# Patient Record
Sex: FEMALE | Race: WHITE | Employment: FULL TIME | RURAL
[De-identification: names, ages, dates, MRNs, and addresses within clinical notes are randomized per-mention and may not be internally consistent; named-entity substitution may affect disease eponyms.]

---

## 2017-03-08 ENCOUNTER — OFFICE VISIT (OUTPATIENT)
Dept: FAMILY MEDICINE CLINIC | Age: 27
End: 2017-03-08

## 2017-03-08 VITALS
DIASTOLIC BLOOD PRESSURE: 75 MMHG | OXYGEN SATURATION: 100 % | TEMPERATURE: 97.3 F | SYSTOLIC BLOOD PRESSURE: 115 MMHG | WEIGHT: 179 LBS | HEART RATE: 91 BPM | BODY MASS INDEX: 31.71 KG/M2 | RESPIRATION RATE: 16 BRPM | HEIGHT: 63 IN

## 2017-03-08 DIAGNOSIS — R53.83 MALAISE AND FATIGUE: Primary | ICD-10-CM

## 2017-03-08 DIAGNOSIS — E06.3 HASHIMOTO'S THYROIDITIS: ICD-10-CM

## 2017-03-08 DIAGNOSIS — R53.81 MALAISE AND FATIGUE: Primary | ICD-10-CM

## 2017-03-08 RX ORDER — CITALOPRAM 40 MG/1
40 TABLET, FILM COATED ORAL DAILY
Qty: 30 TAB | Refills: 3 | Status: SHIPPED | OUTPATIENT
Start: 2017-03-08 | End: 2017-08-28 | Stop reason: SDUPTHER

## 2017-03-08 RX ORDER — LEVOTHYROXINE SODIUM 125 UG/1
125 TABLET ORAL
Qty: 30 TAB | Refills: 3 | Status: CANCELLED | OUTPATIENT
Start: 2017-03-08

## 2017-03-08 RX ORDER — CITALOPRAM 20 MG/1
20 TABLET, FILM COATED ORAL
Qty: 30 TAB | Refills: 5 | Status: CANCELLED | OUTPATIENT
Start: 2017-03-08

## 2017-03-08 NOTE — MR AVS SNAPSHOT
Visit Information Date & Time Provider Department Dept. Phone Encounter #  
 3/8/2017  2:30 PM Segundo Reynolds [phone] Upcoming Health Maintenance Date Due  
 HPV AGE 9Y-34Y (1 of 3 - Female 3 Dose Series) 10/4/2003 PAP AKA CERVICAL CYTOLOGY 5/6/2018 DTaP/Tdap/Td series (2 - Td) 10/12/2025 Allergies as of 3/8/2017  Review Complete On: 3/8/2017 By: Camille Elder LPN Severity Noted Reaction Type Reactions Ancef [Cefazolin] High 09/28/2015    Hives Given at c/s, swelling of face/hives, tx'd with benadryl Pcn [Penicillins] High 07/29/2011   Systemic Hives Facial edema Peanut Medium 10/13/2014   Systemic Hives Chocolate [Cocoa]  05/06/2015    Hives Citrus And Derivatives  08/31/2015    Nausea and Vomiting Oranges only [place] Low 10/13/2014   Systemic Hives Beef Containing Products Low 10/13/2014   Systemic Hives Chicken Derived Low 10/13/2014   Systemic Hives [place] Low 10/13/2014   Systemic Hives Egg Low 10/13/2014   Systemic Hives Milk Low 10/13/2014   Systemic Hives Milk Prot-turm-pepper-pinea Ex Low 10/13/2014   Systemic Hives Shellfish Derived Low 10/13/2014   Systemic Hives Soy Low 10/13/2014   Systemic Hives [place] Low 10/13/2014   Systemic Hives Current Immunizations  Never Reviewed Name Date Tdap 10/12/2015 Not reviewed this visit You Were Diagnosed With   
  
 Codes Comments Malaise and fatigue    -  Primary ICD-10-CM: R53.81, R53.83 ICD-9-CM: 780.79 Hashimoto's thyroiditis     ICD-10-CM: E06.3 ICD-9-CM: 017. 2 Vitals BP Pulse Temp Resp Height(growth percentile) Weight(growth percentile) 115/75 (BP 1 Location: Right arm, BP Patient Position: Sitting) 91 97.3 °F (36.3 °C) (Oral) 16 5' 3\" (1.6 m) 179 lb (81.2 kg) LMP SpO2 Breastfeeding? BMI OB Status Smoking Status 02/27/2017 (Exact Date) 100% No 31.71 kg/m2 Having regular periods Former Smoker Vitals History BMI and BSA Data Body Mass Index Body Surface Area 31.71 kg/m 2 1.9 m 2 Preferred Pharmacy Pharmacy Name Phone [facility] [address] [facility] [phone] Your Updated Medication List  
  
   
This list is accurate as of: 3/8/17  3:17 PM.  Always use your most recent med list.  
  
  
  
  
 * citalopram 20 mg tablet Commonly known as:  Luci Weldon Take 1 Tab by mouth once over twenty-four (24) hours. * citalopram 40 mg tablet Commonly known as:  Luci Weldon Take 1 Tab by mouth daily for 30 days. ibuprofen 600 mg tablet Commonly known as:  MOTRIN Take 1 Tab by mouth every six (6) hours as needed for Pain. IRON PO Take 1 Tab by mouth once over twenty-four (24) hours. levothyroxine 125 mcg tablet Commonly known as:  SYNTHROID Take 1 Tab by mouth Daily (before breakfast). Indications: HASHIMOTO THYROIDITIS * Notice: This list has 2 medication(s) that are the same as other medications prescribed for you. Read the directions carefully, and ask your doctor or other care provider to review them with you. Prescriptions Sent to Pharmacy Refills  
 citalopram (CELEXA) 40 mg tablet 3 Sig: Take 1 Tab by mouth daily for 30 days. Class: Normal  
 Pharmacy: [address] Ph #: [phone] Route: Oral  
  
We Performed the Following CBC WITH AUTOMATED DIFF [15432 CPT(R)] METABOLIC PANEL, COMPREHENSIVE [09128 CPT(R)] THYROID CASCADE PROFILE [[id number] Custom] Patient Instructions Preventing a Relapse of Depression: Care Instructions Your Care Instructions A relapse of depression means your symptoms have come back after you have gotten better. This illness often comes and goes during a lifetime.  But there are many things you can do to keep it from coming back. Follow-up care is a key part of your treatment and safety. Be sure to make and go to all appointments, and call your doctor if you are having problems. It's also a good idea to know your test results and keep a list of the medicines you take. What do you need to know? Know your risk of relapse Talk to your doctor to find out if you are at risk of relapse. Many things can make a person more likely to relapse into depression. These include having a family member with depression, dealing with serious problems in a relationship or a job, having a serious medical condition, or abusing drugs or alcohol. It is important to know your risk and to recognize warning signs of relapse. Once you know these things, you will be better able to keep it from happening to you. Know the warning signs of relapse The two most common signs of relapse are: · Feeling sad or hopeless. · Losing interest in your daily activities. You may have other symptoms, such as: 
· You lose or gain weight. · You sleep too much or not enough. · You feel restless and unable to sit still. · You feel unable to move. · You feel tired all the time. · You feel unworthy or guilty without an obvious reason. · You have problems concentrating, remembering, or making decisions. · You think often about death or suicide. · You feel angry or have panic attacks. How can you care for yourself at home? · Take your medicine as prescribed. Call your doctor if you have any problems with your medicine. Many people take their medicines for at least 6 months after they have recovered. This often helps keep symptoms from coming back. However, if your depression keeps coming back, you may have to take medicine for the rest of your life. · Continue counseling even after you have stopped taking medicine. · Eat healthy foods. Include fruits, vegetables, beans, and whole grains in your diet each day. · Get at least 30 minutes of exercise on most days of the week. Walking is a good choice. You also may want to do other activities, such as running, swimming, cycling, or playing tennis or team sports. · See your doctor right away if you have new symptoms or feel that your depression is coming back. · Keep a regular sleep schedule. Try for 8 hours of sleep a night. · Avoid alcohol and illegal drugs. · Keep the numbers for these national suicide hotlines: 7-449-347-TALK (1-117.729.7995) and 2-018-LCEOBZR (6-975.444.5547). If you or someone you know talks about suicide or feeling hopeless, get help right away. When should you call for help? Call 911 anytime you think you may need emergency care. For example, call if: 
· You are thinking about suicide or are threatening suicide. · You feel you cannot stop from hurting yourself or someone else. · You hear or see things that aren't real. 
· You think or speak in a bizarre way that is not like your usual behavior. Call your doctor now or seek immediate medical care if: 
· You are drinking a lot of alcohol or using illegal drugs. · You are talking or writing about death. Watch closely for changes in your health, and be sure to contact your doctor if: 
· You find it hard or it's getting harder to deal with school, a job, family, or friends. · You think your treatment is not helping or you are not getting better. · Your symptoms get worse or you get new symptoms. · You have any problems with your antidepressant medicines, such as side effects, or you are thinking about stopping your medicine. · You are having manic behavior, such as having very high energy, needing less sleep than normal, or showing risky behavior such as spending money you don't have or abusing others verbally or physically. Where can you learn more? Go to http://mamie-sandrita.info/.  
Enter U440 in the search box to learn more about \"Preventing a Relapse of Depression: Care Instructions. \" Current as of: July 26, 2016 Content Version: 11.1 © 6443-3661 [employer]. Care instructions adapted under license by [employer] (which disclaims liability or warranty for this information). If you have questions about a medical condition or this instruction, always ask your healthcare professional. [address] any warranty or liability for your use of this information. Introducing \[facility]\"" & HEALTH SERVICES! Romayne Duster introduces BTC Trip patient portal. Now you can access parts of your medical record, email your doctor's office, and request medication refills online. 1. In your internet browser, go to [email or web address] 2. Click on the First Time User? Click Here link in the Sign In box. You will see the New Member Sign Up page. 3. Enter your BTC Trip Access Code exactly as it appears below. You will not need to use this code after youve completed the sign-up process. If you do not sign up before the expiration date, you must request a new code. · BTC Trip Access Code: [email or web address] Expires: 6/6/2017  3:17 PM 
 
4. Enter the last four digits of your Social Security Number (xxxx) and Date of Birth (mm/dd/yyyy) as indicated and click Submit. You will be taken to the next sign-up page. 5. Create a BTC Trip ID. This will be your BTC Trip login ID and cannot be changed, so think of one that is secure and easy to remember. 6. Create a BTC Trip password. You can change your password at any time. 7. Enter your Password Reset Question and Answer. This can be used at a later time if you forget your password. 8. Enter your e-mail address. You will receive e-mail notification when new information is available in [address] 9. Click Sign Up. You can now view and download portions of your medical record. 10. Click the Download Summary menu link to download a portable copy of your medical information. If you have questions, please visit the Frequently Asked Questions section of the [employer] website. Remember, Passbox is NOT to be used for urgent needs. For medical emergencies, dial 911. Now available from your iPhone and Android! Please provide this summary of care documentation to your next provider. Your primary care clinician is listed as [address]. If you have any questions after today's visit, please call [phone].

## 2017-03-08 NOTE — PROGRESS NOTES
CC:  Chief Complaint   Patient presents with    Thyroid Problem    Depression    Labs    Medication Refill       Subjective:      Anita Johnson is an 27 y.o. female who presents for followup of hypothyroidism. Thyroid ROS: fatigue, weight gain and feeling cold and cold intolerance. Patient Active Problem List    Diagnosis Date Noted    Previous  delivery affecting pregnancy 2015    Pregnancy 2015    Abnormal chromosomal and genetic finding on  screening of mother 10/26/2015    H/O:  2015    Supervision of other normal pregnancy 2015    H/O idiopathic urticaria 2012    Hashimoto's thyroiditis 10/24/2011    Hypothyroid 2011    Multiple allergies 2011     Current Outpatient Prescriptions   Medication Sig Dispense Refill    citalopram (CELEXA) 40 mg tablet Take 1 Tab by mouth daily for 30 days. 30 Tab 3    ibuprofen (MOTRIN) 600 mg tablet Take 1 Tab by mouth every six (6) hours as needed for Pain. 20 Tab 0    citalopram (CELEXA) 20 mg tablet Take 1 Tab by mouth once over twenty-four (24) hours. 30 Tab 5    levothyroxine (SYNTHROID) 125 mcg tablet Take 1 Tab by mouth Daily (before breakfast). Indications: HASHIMOTO THYROIDITIS 30 Tab 3    FERROUS FUMARATE (IRON PO) Take 1 Tab by mouth once over twenty-four (24) hours.        Allergies   Allergen Reactions    Ancef [Cefazolin] Hives     Given at c/s, swelling of face/hives, tx'd with benadryl    Pcn [Penicillins] Hives     Facial edema     Peanut Hives    Chocolate [Cocoa] Hives    Citrus And Derivatives Nausea and Vomiting     Oranges only    [place] Hives    Beef Containing Products Hives    Chicken Derived Hives    Corn Hives    Egg Hives    Milk Hives    Milk Prot-Turm-Pepper-Pinea Ex Hives    Shellfish Derived Hives    Soy Hives    [place] Hives     Past Medical History:   Diagnosis Date    Environmental allergies     GERD (gastroesophageal reflux disease)     Hashimoto's thyroiditis 10/24/2011    Hashimoto's thyroiditis 10/24/2011    Hypotension     Hypothyroid 2011    Hypothyroidism     Psychiatric disorder     depression --2 yr old daughter  2013    Unspecified adverse effect of anesthesia     per patient with epidural had severe N&V and passed out     Past Surgical History:   Procedure Laterality Date    DELIVERY   11    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, congenital anomaly    HX  SECTION  2016     Family History   Problem Relation Age of Onset    Heart Disease Mother      miltral value prolapse    Thyroid Disease Maternal Grandmother     Diabetes Paternal Grandmother     Diabetes Maternal Aunt     Cancer Paternal Grandfather      throat/stomach cancer,  at age 72    Breast Cancer Other      maternal great grandmother     Social History   Substance Use Topics    Smoking status: Former Smoker    Smokeless tobacco: Never Used    Alcohol use Yes      Comment: Not while pregnant        Objective:     Visit Vitals    /75 (BP 1 Location: Right arm, BP Patient Position: Sitting)    Pulse 91    Temp 97.3 °F (36.3 °C) (Oral)    Resp 16    Ht 5' 3\" (1.6 m)    Wt 179 lb (81.2 kg)    LMP 2017 (Exact Date)    SpO2 100%    Breastfeeding No    BMI 31.71 kg/m2     Exam: thyroid is normal in size without nodules or tenderness. Laboratory:  Lab Results   Component Value Date/Time    TSH 5.180 10/27/2016 01:17 PM    TSH 0.496 2011 02:35 PM    T3 Uptake 32 2011 03:16 PM    T4, Free 1.71 2011 02:35 PM    T4, Total 9.5 2011 03:16 PM         Assessment/Plan:     hypothyroidism control uncertain. Will send for labs and will advise when the results return. ICD-10-CM ICD-9-CM    1. Malaise and fatigue H87.64 856.07 METABOLIC PANEL, COMPREHENSIVE    R53.83  CBC WITH AUTOMATED DIFF   2.  Hashimoto's thyroiditis E06.3 245.2 THYROID CASCADE PROFILE     Pt verbalizes understanding of plan of care and denies further questions or concerns at this time. Follow-up Disposition:  Return if symptoms worsen or fail to improve.

## 2017-03-08 NOTE — PROGRESS NOTES
Identified pt with two pt identifiers(name and ).     Chief Complaint   Patient presents with    Thyroid Problem    Depression    Labs    Medication Refill        Health Maintenance Due   Topic    HPV AGE 9Y-26Y (1 of 3 - Female 3 Dose Series)       Wt Readings from Last 3 Encounters:   17 179 lb (81.2 kg)   16 175 lb (79.4 kg)   16 175 lb (79.4 kg)     Temp Readings from Last 3 Encounters:   17 97.3 °F (36.3 °C) (Oral)   16 97.8 °F (36.6 °C)   16 98.1 °F (36.7 °C) (Oral)     BP Readings from Last 3 Encounters:   17 115/75   16 102/55   16 100/64     Pulse Readings from Last 3 Encounters:   17 91   16 79   16 92         Learning Assessment:  :     Learning Assessment 2015 2014 3/21/2014   PRIMARY LEARNER Patient Patient Patient   HIGHEST LEVEL OF EDUCATION - PRIMARY LEARNER  GRADUATED HIGH SCHOOL OR GED GRADUATED HIGH SCHOOL OR GED GRADUATED HIGH SCHOOL OR GED   BARRIERS PRIMARY LEARNER NONE NONE NONE   CO-LEARNER CAREGIVER No No -   PRIMARY LANGUAGE ENGLISH ENGLISH ENGLISH   LEARNER PREFERENCE PRIMARY READING DEMONSTRATION DEMONSTRATION   ANSWERED BY Patient patient patient   RELATIONSHIP SELF SELF SELF       Depression Screening:  :     PHQ 2 / 9, over the last two weeks 10/27/2016   Little interest or pleasure in doing things Not at all   Feeling down, depressed or hopeless Not at all   Total Score PHQ 2 0   Trouble falling or staying asleep, or sleeping too much -   Feeling tired or having little energy -   Poor appetite or overeating -   Feeling bad about yourself - or that you are a failure or have let yourself or your family down -   Trouble concentrating on things such as school, work, reading or watching TV -   Moving or speaking so slowly that other people could have noticed; or the opposite being so fidgety that others notice -   Thoughts of being better off dead, or hurting yourself in some way -   PHQ 9 Score - How difficult have these problems made it for you to do your work, take care of your home and get along with others -       Fall Risk Assessment:  :     No flowsheet data found. Abuse Screening:  :     Abuse Screening Questionnaire 10/27/2016 5/22/2014   Do you ever feel afraid of your partner? N N   Are you in a relationship with someone who physically or mentally threatens you? N N   Is it safe for you to go home? Y Y       Coordination of Care Questionnaire:  :     1) Have you been to an emergency room, urgent care clinic since your last visit? no   Hospitalized since your last visit? no             2) Have you seen or consulted any other health care providers outside of [address] [place] [address] since your last visit? no  (Include any pap smears or colon screenings in this section.)    3) Do you have an Advance Directive on file? no  Are you interested in receiving information about Advance Directives? no    Patient is accompanied by self. Reviewed record in preparation for visit and have obtained necessary documentation. Medication reconciliation up to date and corrected with patient at this time.

## 2017-03-09 LAB
ALBUMIN SERPL-MCNC: 4.5 G/DL (ref 3.5–5.5)
ALBUMIN/GLOB SERPL: 1.4 {RATIO} (ref 1.1–2.5)
ALP SERPL-CCNC: 72 IU/L (ref 39–117)
ALT SERPL-CCNC: 19 IU/L (ref 0–32)
AST SERPL-CCNC: 17 IU/L (ref 0–40)
BASOPHILS # BLD AUTO: 0 X10E3/UL (ref 0–0.2)
BASOPHILS NFR BLD AUTO: 1 %
BILIRUB SERPL-MCNC: <0.2 MG/DL (ref 0–1.2)
BUN SERPL-MCNC: 13 MG/DL (ref 6–20)
BUN/CREAT SERPL: 20 (ref 8–20)
CALCIUM SERPL-MCNC: 9.6 MG/DL (ref 8.7–10.2)
CHLORIDE SERPL-SCNC: 100 MMOL/L (ref 96–106)
CO2 SERPL-SCNC: 26 MMOL/L (ref 18–29)
CREAT SERPL-MCNC: 0.66 MG/DL (ref 0.57–1)
EOSINOPHIL # BLD AUTO: 0.1 X10E3/UL (ref 0–0.4)
EOSINOPHIL NFR BLD AUTO: 1 %
ERYTHROCYTE [DISTWIDTH] IN BLOOD BY AUTOMATED COUNT: 13 % (ref 12.3–15.4)
GLOBULIN SER CALC-MCNC: 3.2 G/DL (ref 1.5–4.5)
GLUCOSE SERPL-MCNC: 87 MG/DL (ref 65–99)
HCT VFR BLD AUTO: 39.6 % (ref 34–46.6)
HGB BLD-MCNC: 12.8 G/DL (ref 11.1–15.9)
IMM GRANULOCYTES # BLD: 0 X10E3/UL (ref 0–0.1)
IMM GRANULOCYTES NFR BLD: 0 %
INTERPRETIVE COMMENT, 010391: NORMAL
LYMPHOCYTES # BLD AUTO: 2 X10E3/UL (ref 0.7–3.1)
LYMPHOCYTES NFR BLD AUTO: 31 %
MCH RBC QN AUTO: 27.8 PG (ref 26.6–33)
MCHC RBC AUTO-ENTMCNC: 32.3 G/DL (ref 31.5–35.7)
MCV RBC AUTO: 86 FL (ref 79–97)
MONOCYTES # BLD AUTO: 0.5 X10E3/UL (ref 0.1–0.9)
MONOCYTES NFR BLD AUTO: 8 %
NEUTROPHILS # BLD AUTO: 3.9 X10E3/UL (ref 1.4–7)
NEUTROPHILS NFR BLD AUTO: 59 %
PLATELET # BLD AUTO: 357 X10E3/UL (ref 150–379)
POTASSIUM SERPL-SCNC: 4.5 MMOL/L (ref 3.5–5.2)
PROT SERPL-MCNC: 7.7 G/DL (ref 6–8.5)
RBC # BLD AUTO: 4.6 X10E6/UL (ref 3.77–5.28)
SODIUM SERPL-SCNC: 140 MMOL/L (ref 134–144)
T3FREE SERPL-MCNC: 3.3 PG/ML (ref 2–4.4)
T4 FREE SERPL-MCNC: 1.41 NG/DL (ref 0.82–1.77)
TSH SERPL DL<=0.005 MIU/L-ACNC: 0.29 UIU/ML (ref 0.45–4.5)
WBC # BLD AUTO: 6.5 X10E3/UL (ref 3.4–10.8)

## 2017-03-13 ENCOUNTER — TELEPHONE (OUTPATIENT)
Dept: FAMILY MEDICINE CLINIC | Age: 27
End: 2017-03-13

## 2017-03-13 DIAGNOSIS — E06.3 HASHIMOTO'S THYROIDITIS: Primary | ICD-10-CM

## 2017-03-13 NOTE — TELEPHONE ENCOUNTER
----- Message from Diaz Orr MD sent at 3/13/2017  1:22 PM EDT -----  Results show that her TSH is low. This means that her medication may be too high. However, I would like to recheck it in 1-month to see if this is the case. If it is still too low, then will change the medication. I will put in an order and in 1-month she should just stop in and have her TSH rechecked.

## 2017-03-13 NOTE — PROGRESS NOTES
Results show that her TSH is low. This means that her medication may be too high. However, I would like to recheck it in 1-month to see if this is the case. If it is still too low, then will change the medication. I will put in an order and in 1-month she should just stop in and have her TSH rechecked.

## 2017-03-29 ENCOUNTER — OFFICE VISIT (OUTPATIENT)
Dept: FAMILY MEDICINE CLINIC | Age: 27
End: 2017-03-29

## 2017-03-29 VITALS
BODY MASS INDEX: 32.25 KG/M2 | HEIGHT: 63 IN | RESPIRATION RATE: 18 BRPM | WEIGHT: 182 LBS | OXYGEN SATURATION: 98 % | TEMPERATURE: 98 F | HEART RATE: 71 BPM | DIASTOLIC BLOOD PRESSURE: 66 MMHG | SYSTOLIC BLOOD PRESSURE: 108 MMHG

## 2017-03-29 DIAGNOSIS — E06.3 HASHIMOTO'S THYROIDITIS: Primary | ICD-10-CM

## 2017-03-29 NOTE — PROGRESS NOTES
CC:  Chief Complaint   Patient presents with    Labs     Thyroid    Follow-up     HISTORY OF PRESENT ILLNESS  Jimenez Martinez is a 27 y.o. female. HPI Comments: Who presents today for follow up of thyroid testing which showed a low TSH suggesting elevated medication. However, would like to recheck prior to changing the medications. In addition, she is here for follow up of her depression which seems to be improving with the addition of the citalopram. She reports that she is beginning to feel a littler better. Labs     Follow-up         ROS:  Review of Systems   All other systems reviewed and are negative. OBJECTIVE:  Visit Vitals    /66 (BP 1 Location: Right arm, BP Patient Position: Sitting)  Comment: auto cuff    Pulse 71    Temp 98 °F (36.7 °C) (Oral)    Resp 18    Ht 5' 3\" (1.6 m)    Wt 182 lb (82.6 kg)    LMP 02/27/2017    SpO2 98%    BMI 32.24 kg/m2   Physical Exam   Eyes: Pupils are equal, round, and reactive to light. Neck: Normal range of motion. Cardiovascular: Normal rate and regular rhythm. Pulmonary/Chest: Effort normal and breath sounds normal.   Musculoskeletal: Normal range of motion. Neurological: She is alert. Nursing note and vitals reviewed.     LABS:  Results for orders placed or performed in visit on 03/08/17   THYROID CASCADE PROFILE   Result Value Ref Range    TSH 0.290 (L) 0.450 - 1.382 uIU/mL   METABOLIC PANEL, COMPREHENSIVE   Result Value Ref Range    Glucose 87 65 - 99 mg/dL    BUN 13 6 - 20 mg/dL    Creatinine 0.66 0.57 - 1.00 mg/dL    GFR est non- >59 mL/min/1.73    GFR est  >59 mL/min/1.73    BUN/Creatinine ratio 20 8 - 20    Sodium 140 134 - 144 mmol/L    Potassium 4.5 3.5 - 5.2 mmol/L    Chloride 100 96 - 106 mmol/L    CO2 26 18 - 29 mmol/L    Calcium 9.6 8.7 - 10.2 mg/dL    Protein, total 7.7 6.0 - 8.5 g/dL    Albumin 4.5 3.5 - 5.5 g/dL    GLOBULIN, TOTAL 3.2 1.5 - 4.5 g/dL    A-G Ratio 1.4 1.1 - 2.5    Bilirubin, total <0.2 0.0 - 1.2 mg/dL    Alk. phosphatase 72 39 - 117 IU/L    AST (SGOT) 17 0 - 40 IU/L    ALT (SGPT) 19 0 - 32 IU/L   CBC WITH AUTOMATED DIFF   Result Value Ref Range    WBC 6.5 3.4 - 10.8 x10E3/uL    RBC 4.60 3.77 - 5.28 x10E6/uL    HGB 12.8 11.1 - 15.9 g/dL    HCT 39.6 34.0 - 46.6 %    MCV 86 79 - 97 fL    MCH 27.8 26.6 - 33.0 pg    MCHC 32.3 31.5 - 35.7 g/dL    RDW 13.0 12.3 - 15.4 %    PLATELET 600 901 - 360 x10E3/uL    NEUTROPHILS 59 %    Lymphocytes 31 %    MONOCYTES 8 %    EOSINOPHILS 1 %    BASOPHILS 1 %    ABS. NEUTROPHILS 3.9 1.4 - 7.0 x10E3/uL    Abs Lymphocytes 2.0 0.7 - 3.1 x10E3/uL    ABS. MONOCYTES 0.5 0.1 - 0.9 x10E3/uL    ABS. EOSINOPHILS 0.1 0.0 - 0.4 x10E3/uL    ABS. BASOPHILS 0.0 0.0 - 0.2 x10E3/uL    IMMATURE GRANULOCYTES 0 %    ABS. IMM. GRANS. 0.0 0.0 - 0.1 x10E3/uL   THYROXINE (T4) FREE, DIRECT, S   Result Value Ref Range    T4,Free,(Direct) 1.41 0.82 - 1.77 ng/dL   T3FREE   Result Value Ref Range    Triiodothyronine(T3), free 3.3 2.0 - 4.4 pg/mL    Interpretive comment Comment        ASSESSMENT and PLAN    ICD-10-CM ICD-9-CM    1. Hashimoto's thyroiditis E06.3 245.2 THYROID CASCADE PROFILE     Given very low TSH levels, will repeat and if still low, will decrease medication dose. Also, she is doing well with citalopram and will continue. Pt verbalizes understanding of plan of care and denies further questions or concerns at this time. Follow-up Disposition:  Return if symptoms worsen or fail to improve.

## 2017-03-29 NOTE — MR AVS SNAPSHOT
Visit Information Date & Time Provider Department Dept. Phone Encounter #  
 3/29/2017  2:30 PM [facility] A[facility]as [phone] Upcoming Health Maintenance Date Due  
 HPV AGE 9Y-34Y (1 of 3 - Female 3 Dose Series) 10/4/2003 PAP AKA CERVICAL CYTOLOGY 5/6/2018 DTaP/Tdap/Td series (2 - Td) 10/12/2025 Allergies as of 3/29/2017  Review Complete On: 3/29/2017 By: Markham Holter, LPN Severity Noted Reaction Type Reactions Ancef [Cefazolin] High 09/28/2015    Hives Given at c/s, swelling of face/hives, tx'd with benadryl Pcn [Penicillins] High 07/29/2011   Systemic Hives Facial edema Peanut Medium 10/13/2014   Systemic Hives Chocolate [Cocoa]  05/06/2015    Hives Citrus And Derivatives  08/31/2015    Nausea and Vomiting Oranges only [place] Low 10/13/2014   Systemic Hives Beef Containing Products Low 10/13/2014   Systemic Hives Chicken Derived Low 10/13/2014   Systemic Hives [place] Low 10/13/2014   Systemic Hives Egg Low 10/13/2014   Systemic Hives Milk Low 10/13/2014   Systemic Hives Milk Prot-turm-pepper-pinea Ex Low 10/13/2014   Systemic Hives Shellfish Derived Low 10/13/2014   Systemic Hives Soy Low 10/13/2014   Systemic Hives Lottie Low 10/13/2014   Systemic Hives Current Immunizations  Never Reviewed Name Date Tdap 10/12/2015 Not reviewed this visit You Were Diagnosed With   
  
 Codes Comments Hashimoto's thyroiditis    -  Primary ICD-10-CM: E06.3 ICD-9-CM: 548. 2 Vitals BP Pulse Temp Resp Height(growth percentile) Weight(growth percentile) 108/66 (BP 1 Location: Right arm, BP Patient Position: Sitting) 71 98 °F (36.7 °C) (Oral) 18 5' 3\" (1.6 m) 182 lb (82.6 kg) LMP SpO2 BMI OB Status Smoking Status 02/27/2017 98% 32.24 kg/m2 Having regular periods Former Smoker Vitals History BMI and BSA Data Body Mass Index Body Surface Area 32.24 kg/m 2 1.92 m 2 Preferred Pharmacy Pharmacy Name Phone [facility] [address] [facility] [address] [place] [phone] Your Updated Medication List  
  
   
This list is accurate as of: 3/29/17  2:53 PM.  Always use your most recent med list.  
  
  
  
  
 * citalopram 20 mg tablet Commonly known as:  Cheryal Coke Take 1 Tab by mouth once over twenty-four (24) hours. * citalopram 40 mg tablet Commonly known as:  Cheryal Coke Take 1 Tab by mouth daily for 30 days. ibuprofen 600 mg tablet Commonly known as:  MOTRIN Take 1 Tab by mouth every six (6) hours as needed for Pain. IRON PO Take 1 Tab by mouth once over twenty-four (24) hours. levothyroxine 125 mcg tablet Commonly known as:  SYNTHROID Take 1 Tab by mouth Daily (before breakfast). Indications: HASHIMOTO THYROIDITIS * Notice: This list has 2 medication(s) that are the same as other medications prescribed for you. Read the directions carefully, and ask your doctor or other care provider to review them with you. We Performed the Following THYROID CASCADE PROFILE [[id number] Custom] Introducing [facility] & [facility] SERVICES! Dear Ean: Thank you for requesting a "[employer] account. Our records indicate that you already have an active "[employer] account. You can access your account anytime at [email or web address] Did you know that you can access your hospital and ER discharge instructions at any time in "[employer]? You can also review all of your test results from your hospital stay or ER visit. Additional Information If you have questions, please visit the Frequently Asked Questions section of the "[employer] website at [email or web address] Remember, "[employer] is NOT to be used for urgent needs. For medical emergencies, dial 911. Now available from your iPhone and Android! Please provide this summary of care documentation to your next provider. Your primary care clinician is listed as [address]. If you have any questions after today's visit, please call [phone].

## 2017-03-30 LAB
INTERPRETIVE COMMENT, 010391: NORMAL
T3FREE SERPL-MCNC: 2.9 PG/ML (ref 2–4.4)
T4 FREE SERPL-MCNC: 1.4 NG/DL (ref 0.82–1.77)
TSH SERPL DL<=0.005 MIU/L-ACNC: 0.37 UIU/ML (ref 0.45–4.5)

## 2017-04-06 ENCOUNTER — TELEPHONE (OUTPATIENT)
Dept: FAMILY MEDICINE CLINIC | Age: 27
End: 2017-04-06

## 2017-04-06 RX ORDER — LEVOTHYROXINE SODIUM 100 UG/1
100 TABLET ORAL
Qty: 30 TAB | Refills: 3 | Status: SHIPPED | OUTPATIENT
Start: 2017-04-06 | End: 2017-05-06

## 2017-04-06 NOTE — TELEPHONE ENCOUNTER
----- Message from Matti Mack MD sent at 4/6/2017  1:54 PM EDT -----  Repeat TSH was still very low. I am going to cut back her medications to 100 mcgs per day. Prescription has been sent to her pharmacy of record. Would like to recheck her TSH in 1-month of starting the new dose.

## 2017-06-30 ENCOUNTER — HOSPITAL ENCOUNTER (EMERGENCY)
Age: 27
Discharge: HOME | End: 2017-07-01
Attending: EMERGENCY MEDICINE | Admitting: EMERGENCY MEDICINE
Payer: COMMERCIAL
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
## 2017-06-30 DIAGNOSIS — M79.10 MUSCULAR ACHES: ICD-10-CM

## 2017-06-30 PROCEDURE — 99284 EMERGENCY DEPT VISIT MOD MDM: CPT

## 2017-06-30 RX ORDER — LEVOTHYROXINE SODIUM 100 UG/1
100 TABLET ORAL
COMMUNITY
End: 2017-08-28 | Stop reason: SDUPTHER

## 2017-06-30 NOTE — LETTER
[address] 
[facility] EMERGENCY DEPT 
[address] [facility] [id number] [phone] Work/School Note Date: 6/30/2017 To Whom It May concern: 
 
Lilo Adames was seen and treated today in the emergency room by the following provider(s): 
Attending Provider: Polo Shaikh MD 
Nurse Practitioner: Carlos Morales NP. Lilo Adames may return to work on 7/5/2017. Sincerely, Carlos Morales NP

## 2017-07-01 VITALS
DIASTOLIC BLOOD PRESSURE: 72 MMHG | HEART RATE: 99 BPM | TEMPERATURE: 97.6 F | WEIGHT: 280 LBS | HEIGHT: 63 IN | OXYGEN SATURATION: 99 % | BODY MASS INDEX: 49.61 KG/M2 | RESPIRATION RATE: 16 BRPM | SYSTOLIC BLOOD PRESSURE: 119 MMHG

## 2017-07-01 PROCEDURE — 74011250637 HC RX REV CODE- 250/637: Performed by: NURSE PRACTITIONER

## 2017-07-01 RX ORDER — IBUPROFEN 800 MG/1
800 TABLET ORAL
Status: COMPLETED | OUTPATIENT
Start: 2017-07-01 | End: 2017-07-01

## 2017-07-01 RX ORDER — HYDROCODONE BITARTRATE AND ACETAMINOPHEN 5; 325 MG/1; MG/1
1 TABLET ORAL
Qty: 8 TAB | Refills: 0 | Status: SHIPPED | OUTPATIENT
Start: 2017-07-01 | End: 2017-08-28 | Stop reason: ALTCHOICE

## 2017-07-01 RX ORDER — METHOCARBAMOL 500 MG/1
750 TABLET, FILM COATED ORAL
Status: COMPLETED | OUTPATIENT
Start: 2017-07-01 | End: 2017-07-01

## 2017-07-01 RX ORDER — IBUPROFEN 800 MG/1
800 TABLET ORAL
Qty: 30 TAB | Refills: 0 | Status: SHIPPED | OUTPATIENT
Start: 2017-07-01 | End: 2018-01-09 | Stop reason: ALTCHOICE

## 2017-07-01 RX ORDER — METHOCARBAMOL 500 MG/1
500 TABLET, FILM COATED ORAL 4 TIMES DAILY
Qty: 12 TAB | Refills: 0 | Status: SHIPPED | OUTPATIENT
Start: 2017-07-01 | End: 2017-08-28 | Stop reason: ALTCHOICE

## 2017-07-01 RX ADMIN — METHOCARBAMOL 750 MG: 500 TABLET ORAL at 00:53

## 2017-07-01 RX ADMIN — IBUPROFEN 800 MG: 800 TABLET, FILM COATED ORAL at 00:41
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
## 2017-08-28 ENCOUNTER — OFFICE VISIT (OUTPATIENT)
Dept: FAMILY MEDICINE CLINIC | Age: 27
End: 2017-08-28

## 2017-08-28 VITALS
RESPIRATION RATE: 16 BRPM | HEIGHT: 63 IN | WEIGHT: 189 LBS | SYSTOLIC BLOOD PRESSURE: 108 MMHG | TEMPERATURE: 98 F | BODY MASS INDEX: 33.49 KG/M2 | OXYGEN SATURATION: 97 % | DIASTOLIC BLOOD PRESSURE: 71 MMHG | HEART RATE: 74 BPM

## 2017-08-28 DIAGNOSIS — E06.3 HYPOTHYROIDISM DUE TO HASHIMOTO'S THYROIDITIS: Primary | ICD-10-CM

## 2017-08-28 DIAGNOSIS — E03.8 HYPOTHYROIDISM DUE TO HASHIMOTO'S THYROIDITIS: Primary | ICD-10-CM

## 2017-08-28 RX ORDER — CITALOPRAM 40 MG/1
40 TABLET, FILM COATED ORAL
COMMUNITY
Start: 2017-07-08 | End: 2017-08-28 | Stop reason: SDUPTHER

## 2017-08-28 RX ORDER — CITALOPRAM 40 MG/1
40 TABLET, FILM COATED ORAL
Qty: 90 TAB | Refills: 1 | Status: SHIPPED | OUTPATIENT
Start: 2017-08-28 | End: 2018-04-03 | Stop reason: SDUPTHER

## 2017-08-28 RX ORDER — LEVOTHYROXINE SODIUM 100 UG/1
100 TABLET ORAL
Qty: 90 TAB | Refills: 1 | Status: SHIPPED | OUTPATIENT
Start: 2017-08-28 | End: 2018-01-15 | Stop reason: DRUGHIGH

## 2017-08-28 NOTE — MR AVS SNAPSHOT
Visit Information Date & Time Provider Department Dept. Phone Encounter #  
 8/28/2017  1:45 PM Gregor Tuttle [address] [id number] Upcoming Health Maintenance Date Due  
 HPV AGE 9Y-34Y (1 of 3 - Female 3 Dose Series) 10/4/2003 INFLUENZA AGE 9 TO ADULT 8/1/2017 PAP AKA CERVICAL CYTOLOGY 5/6/2018 DTaP/Tdap/Td series (2 - Td) 10/12/2025 Allergies as of 8/28/2017  Review Complete On: 8/28/2017 By: Gregor Tuttle MD  
  
 Severity Noted Reaction Type Reactions Ancef [Cefazolin] High 09/28/2015    Hives Given at c/s, swelling of face/hives, tx'd with benadryl Pcn [Penicillins] High 07/29/2011   Systemic Hives Facial edema Peanut Medium 10/13/2014   Systemic Hives Chocolate [Cocoa]  05/06/2015    Hives Citrus And Derivatives  08/31/2015    Nausea and Vomiting Oranges only [place] Low 10/13/2014   Systemic Hives Beef Containing Products Low 10/13/2014   Systemic Hives Chicken Derived Low 10/13/2014   Systemic Hives [place] Low 10/13/2014   Systemic Hives Egg Low 10/13/2014   Systemic Hives Milk Low 10/13/2014   Systemic Hives Milk Prot-turm-pepper-pineappl Low 10/13/2014   Systemic Hives Shellfish Derived Low 10/13/2014   Systemic Hives Soy Low 10/13/2014   Systemic Hives [place] Low 10/13/2014   Systemic Hives Current Immunizations  Never Reviewed Name Date Tdap 10/12/2015 Not reviewed this visit You Were Diagnosed With   
  
 Codes Comments Hypothyroidism due to Hashimoto's thyroiditis    -  Primary ICD-10-CM: E03.8, E06.3 ICD-9-CM: 244.8, 245.2 Vitals BP Pulse Temp Resp Height(growth percentile) Weight(growth percentile) 108/71 (BP 1 Location: Right arm, BP Patient Position: Sitting) 74 98 °F (36.7 °C) (Oral) 16 5' 3\" (1.6 m) 189 lb (85.7 kg) LMP SpO2 Breastfeeding? BMI OB Status Smoking Status 08/01/2017 (Exact Date) 97% No 33.48 kg/m2 Having regular periods Former Smoker Vitals History BMI and BSA Data Body Mass Index Body Surface Area  
 33.48 kg/m 2 1.95 m 2 Preferred Pharmacy Pharmacy Name Phone [facility] [address] [place] [address] [phone] Your Updated Medication List  
  
   
This list is accurate as of: 8/28/17  2:38 PM.  Always use your most recent med list.  
  
  
  
  
 citalopram 40 mg tablet Commonly known as:  Tildon Holiness Take 1 Tab by mouth once over twenty-four (24) hours for 180 days. ibuprofen 800 mg tablet Commonly known as:  MOTRIN Take 1 Tab by mouth every six (6) hours as needed for Pain. IRON PO Take 1 Tab by mouth once over twenty-four (24) hours. levothyroxine 100 mcg tablet Commonly known as:  SYNTHROID Take 1 Tab by mouth Daily (before breakfast) for 180 days. Prescriptions Sent to Pharmacy Refills  
 citalopram (CELEXA) 40 mg tablet 1 Sig: Take 1 Tab by mouth once over twenty-four (24) hours for 180 days. Class: Normal  
 Pharmacy: [address] Ph #: [phone] Route: Oral  
 levothyroxine (SYNTHROID) 100 mcg tablet 1 Sig: Take 1 Tab by mouth Daily (before breakfast) for 180 days. Class: Normal  
 Pharmacy: [address] Ph #: [phone] Route: Oral  
  
We Performed the Following THYROID CASCADE PROFILE [[id number] Custom] Introducing [facility] & HEALTH SERVICES! Dear Ean: Thank you for requesting a [employer] account. Our records indicate that you already have an active [employer] account. You can access your account anytime at [email or web address] Did you know that you can access your hospital and ER discharge instructions at any time in [employer]? You can also review all of your test results from your hospital stay or ER visit. Additional Information If you have questions, please visit the Frequently Asked Questions section of the [employer] website at [email or web address] Remember, [employer] is NOT to be used for urgent needs. For medical emergencies, dial 911. Now available from your iPhone and Android! Please provide this summary of care documentation to your next provider. Your primary care clinician is listed as [address]. If you have any questions after today's visit, please call [phone].
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
## 2017-08-29 LAB — TSH SERPL DL<=0.005 MIU/L-ACNC: 2.57 UIU/ML (ref 0.45–4.5)

## 2017-11-29 ENCOUNTER — HOSPITAL ENCOUNTER (OUTPATIENT)
Dept: GENERAL RADIOLOGY | Age: 27
Discharge: HOME | End: 2017-11-29
Attending: NURSE PRACTITIONER
Payer: COMMERCIAL

## 2017-11-29 ENCOUNTER — OFFICE VISIT (OUTPATIENT)
Dept: FAMILY MEDICINE CLINIC | Age: 27
End: 2017-11-29

## 2017-11-29 ENCOUNTER — TELEPHONE (OUTPATIENT)
Dept: FAMILY MEDICINE CLINIC | Age: 27
End: 2017-11-29

## 2017-11-29 VITALS
DIASTOLIC BLOOD PRESSURE: 60 MMHG | OXYGEN SATURATION: 99 % | TEMPERATURE: 98.5 F | HEART RATE: 86 BPM | BODY MASS INDEX: 31.89 KG/M2 | HEIGHT: 63 IN | RESPIRATION RATE: 16 BRPM | SYSTOLIC BLOOD PRESSURE: 96 MMHG | WEIGHT: 180 LBS

## 2017-11-29 DIAGNOSIS — R07.1 INSPIRATORY PAIN: ICD-10-CM

## 2017-11-29 DIAGNOSIS — R05.9 COUGH: ICD-10-CM

## 2017-11-29 DIAGNOSIS — R05.9 COUGH: Primary | ICD-10-CM

## 2017-11-29 PROCEDURE — 71020 XR CHEST PA LAT: CPT

## 2017-11-29 RX ORDER — DOXYCYCLINE 100 MG/1
100 TABLET ORAL 2 TIMES DAILY
Qty: 20 TAB | Refills: 0 | Status: SHIPPED | OUTPATIENT
Start: 2017-11-29 | End: 2017-12-09

## 2017-11-29 RX ORDER — ALBUTEROL SULFATE 90 UG/1
2 AEROSOL, METERED RESPIRATORY (INHALATION)
Qty: 1 INHALER | Refills: 0 | Status: SHIPPED | OUTPATIENT
Start: 2017-11-29 | End: 2018-02-20 | Stop reason: SDUPTHER

## 2017-11-29 NOTE — PROGRESS NOTES
Chief Complaint   Patient presents with    Rib Pain     pt states she has a bad cough and her left side is sore and radiates into left shoulder. hurts to breath in and has a migraine also. started 3 days ago    Cough     states she is coughing lots of mucus. \"REVIEWED RECORD IN PREPARATION FOR VISIT AND HAVE OBTAINED THE NECESSARY DOCUMENTATION\"  1. Have you been to the ER, urgent care clinic since your last visit? Hospitalized since your last visit? No    2. Have you seen or consulted any other health care providers outside of the [address] [place] [address] since your last visit? Include any pap smears or colon screening. No  Patient does not have advanced directives.

## 2017-11-29 NOTE — TELEPHONE ENCOUNTER
----- Message from Antwan Stoll NP sent at 11/29/2017  3:50 PM EST -----  Please call patient and let her know that her x-ray returned normal.  This is reassuring. She should take medications as prescribed, and should return to office with continued or worsening of symptoms. Thanks!

## 2017-11-29 NOTE — PROGRESS NOTES
HISTORY OF PRESENT ILLNESS  Lindsay Rowe is a 24 y.o. female. HPI  Pt presents with \"rib pain and cough\"    Symptoms started about 3 days ago  Cough is very bothersome  She is able to cough up mucous at times  Sometimes, she does feel short of breath with the cough  Now, when she takes a deep breath in, her left lower rib is painful  No fever  She has taken Ibuprofen, with no benefit  Review of Systems   Constitutional: Positive for malaise/fatigue. Negative for fever. HENT: Negative for congestion. Respiratory: Positive for cough, sputum production and shortness of breath. Gastrointestinal: Negative for diarrhea and vomiting. Physical Exam   Constitutional: She is oriented to person, place, and time. She appears well-developed and well-nourished. HENT:   Head: Normocephalic and atraumatic. Right Ear: Hearing, tympanic membrane, external ear and ear canal normal.   Left Ear: Hearing, tympanic membrane, external ear and ear canal normal.   Nose: Nose normal.   Mouth/Throat: Oropharynx is clear and moist.   Neck: Normal range of motion. Neck supple. Cardiovascular: Normal rate, regular rhythm and normal heart sounds. Pulmonary/Chest: Effort normal. She has wheezes in the right upper field and the left upper field. She has rhonchi in the left lower field. Lymphadenopathy:     She has no cervical adenopathy. Neurological: She is alert and oriented to person, place, and time. Skin: Skin is warm and dry. Psychiatric: She has a normal mood and affect. Her behavior is normal.       ASSESSMENT and PLAN    ICD-10-CM ICD-9-CM    1. Cough R05 786.2 XR CHEST PA LAT      albuterol (PROVENTIL HFA, VENTOLIN HFA, PROAIR HFA) 90 mcg/actuation inhaler      doxycycline (ADOXA) 100 mg tablet   2. Inspiratory pain R07.1 786.52 XR CHEST PA LAT     Due to pain with inspiration, will get chest x-ray.   We will notify her when this returns, and inform her of any change in plan of care at that time.  Educated about monitoring fever, and treating as needed. Pt informed to return to office with worsening of symptoms, or PRN with any questions or concerns. Pt verbalizes understanding of plan of care and denies further questions or concerns at this time.

## 2017-11-29 NOTE — PATIENT INSTRUCTIONS

## 2017-11-29 NOTE — PROGRESS NOTES
Please call patient and let her know that her x-ray returned normal.  This is reassuring. She should take medications as prescribed, and should return to office with continued or worsening of symptoms. Thanks!

## 2017-11-29 NOTE — MR AVS SNAPSHOT
Visit Information Date & Time Provider Department Dept. Phone Encounter #  
 11/29/2017  1:30 PM Andrew Perez  [address]  Follow-up Instructions Return if symptoms worsen or fail to improve. Follow-up and Disposition History Your Appointments 1/11/2018  1:45 PM  
ROUTINE CARE with Lillian Xavier MD  
[address] [facility]) Appt Note: follow up [address] [facility]  
  
   
 [address] Upcoming Health Maintenance Date Due  
 HPV AGE 9Y-34Y (1 of 3 - Female 3 Dose Series) 10/4/2003 Influenza Age 5 to Adult 8/1/2017 PAP AKA CERVICAL CYTOLOGY 5/6/2018 DTaP/Tdap/Td series (2 - Td) 10/12/2025 Allergies as of 11/29/2017  Review Complete On: 11/29/2017 By: Andrew Perez NP Severity Noted Reaction Type Reactions Ancef [Cefazolin] High 09/28/2015    Hives Given at c/s, swelling of face/hives, tx'd with benadryl Pcn [Penicillins] High 07/29/2011   Systemic Hives Facial edema Peanut Medium 10/13/2014   Systemic Hives Chocolate [Cocoa]  05/06/2015    Hives Citrus And Derivatives  08/31/2015    Nausea and Vomiting Oranges only [place] Low 10/13/2014   Systemic Hives Beef Containing Products Low 10/13/2014   Systemic Hives Chicken Derived Low 10/13/2014   Systemic Hives [place] Low 10/13/2014   Systemic Hives Egg Low 10/13/2014   Systemic Hives Milk Low 10/13/2014   Systemic Hives Milk Prot-turm-pepper-pineappl Low 10/13/2014   Systemic Hives Shellfish Derived Low 10/13/2014   Systemic Hives Soy Low 10/13/2014   Systemic Hives [place] Low 10/13/2014   Systemic Hives Current Immunizations  Never Reviewed Name Date Tdap 10/12/2015 Not reviewed this visit You Were Diagnosed With   
  
 Codes Comments Cough    -  Primary ICD-10-CM: D91 ICD-9-CM: 786.2 Inspiratory pain     ICD-10-CM: R07.1 ICD-9-CM: 786.52 Vitals BP Pulse Temp Resp Height(growth percentile) Weight(growth percentile) 96/60 86 98.5 °F (36.9 °C) (Oral) 16 5' 3\" (1.6 m) 180 lb (81.6 kg) LMP SpO2 Breastfeeding? BMI OB Status Smoking Status 11/20/2017 99% No 31.89 kg/m2 Having regular periods Former Smoker BMI and BSA Data Body Mass Index Body Surface Area  
 31.89 kg/m 2 1.9 m 2 Preferred Pharmacy Pharmacy Name Phone [facility] [phone] Your Updated Medication List  
  
   
This list is accurate as of: 11/29/17 11:59 PM.  Always use your most recent med list.  
  
  
  
  
 albuterol 90 mcg/actuation inhaler Commonly known as:  PROVENTIL HFA, VENTOLIN HFA, PROAIR HFA Take 2 Puffs by inhalation every four (4) hours as needed for Wheezing. citalopram 40 mg tablet Commonly known as:  Donna Alexei Take 1 Tab by mouth once over twenty-four (24) hours for 180 days. doxycycline 100 mg tablet Commonly known as:  ADOXA Take 1 Tab by mouth two (2) times a day for 10 days. ibuprofen 800 mg tablet Commonly known as:  MOTRIN Take 1 Tab by mouth every six (6) hours as needed for Pain. IRON PO Take 1 Tab by mouth once over twenty-four (24) hours. levothyroxine 100 mcg tablet Commonly known as:  SYNTHROID Take 1 Tab by mouth Daily (before breakfast) for 180 days. Prescriptions Sent to Pharmacy Refills  
 albuterol (PROVENTIL HFA, VENTOLIN HFA, PROAIR HFA) 90 mcg/actuation inhaler 0 Sig: Take 2 Puffs by inhalation every four (4) hours as needed for Wheezing. Class: Normal  
 Pharmacy: [address] [facility] #: [phone] Route: Inhalation  
 doxycycline (ADOXA) 100 mg tablet 0 Sig: Take 1 Tab by mouth two (2) times a day for 10 days. Class: Normal  
 Pharmacy: [address] [facility] HARLEEN Jorje Ochoa  #: [phone] Route: Oral  
  
Follow-up Instructions Return if symptoms worsen or fail to improve. To-Do List   
 11/30/2017 Imaging:  XR CHEST PA LAT Patient Instructions Cough: Care Instructions Your Care Instructions A cough is your body's response to something that bothers your throat or airways. Many things can cause a cough. You might cough because of a cold or the flu, bronchitis, or asthma. Smoking, postnasal drip, allergies, and stomach acid that backs up into your throat also can cause coughs. A cough is a symptom, not a disease. Most coughs stop when the cause, such as a cold, goes away. You can take a few steps at home to cough less and feel better. Follow-up care is a key part of your treatment and safety. Be sure to make and go to all appointments, and call your doctor if you are having problems. It's also a good idea to know your test results and keep a list of the medicines you take. How can you care for yourself at home? · Drink lots of water and other fluids. This helps thin the mucus and soothes a dry or sore throat. Honey or lemon juice in hot water or tea may ease a dry cough. · Take cough medicine as directed by your doctor. · Prop up your head on pillows to help you breathe and ease a dry cough. · Try cough drops to soothe a dry or sore throat. Cough drops don't stop a cough. Medicine-flavored cough drops are no better than candy-flavored drops or hard candy. · Do not smoke. Avoid secondhand smoke. If you need help quitting, talk to your doctor about stop-smoking programs and medicines. These can increase your chances of quitting for good. When should you call for help? Call 911 anytime you think you may need emergency care. For example, call if: 
? · You have severe trouble breathing. ?Call your doctor now or seek immediate medical care if: ? · You cough up blood. ? · You have new or worse trouble breathing. ? · You have a new or higher fever. ? · You have a new rash. ? Watch closely for changes in your health, and be sure to contact your doctor if: 
? · You cough more deeply or more often, especially if you notice more mucus or a change in the color of your mucus. ? · You have new symptoms, such as a sore throat, an earache, or sinus pain. ? · You do not get better as expected. Where can you learn more? Go to http://mamie-sandrita.info/. Enter D279 in the search box to learn more about \"Cough: Care Instructions. \" Current as of: May 12, 2017 Content Version: 11.4 © 0339-5339 Daily [employer]. Care instructions adapted under license by [employer] (which disclaims liability or warranty for this information). If you have questions about a medical condition or this instruction, always ask your healthcare professional. [place] [address] any warranty or liability for your use of this information. Patient Instructions History Introducing [facility] & HEALTH SERVICES! Dear Ean: Thank you for requesting a [employer] account. Our records indicate that you already have an active [employer] account. You can access your account anytime at [email or web address] Did you know that you can access your hospital and ER discharge instructions at any time in [employer]? You can also review all of your test results from your hospital stay or ER visit. Additional Information If you have questions, please visit the Frequently Asked Questions section of the [employer] website at [email or web address] Remember, [employer] is NOT to be used for urgent needs. For medical emergencies, dial 911. Now available from your iPhone and Android! Please provide this summary of care documentation to your next provider. Your primary care clinician is listed as [address]. If you have any questions after today's visit, please call [phone].

## 2018-01-09 ENCOUNTER — HOSPITAL ENCOUNTER (EMERGENCY)
Age: 28
Discharge: HOME | End: 2018-01-09
Attending: EMERGENCY MEDICINE
Payer: COMMERCIAL

## 2018-01-09 VITALS
BODY MASS INDEX: 33.13 KG/M2 | TEMPERATURE: 97.7 F | RESPIRATION RATE: 16 BRPM | OXYGEN SATURATION: 100 % | HEIGHT: 62 IN | HEART RATE: 86 BPM | DIASTOLIC BLOOD PRESSURE: 61 MMHG | WEIGHT: 180 LBS | SYSTOLIC BLOOD PRESSURE: 99 MMHG

## 2018-01-09 DIAGNOSIS — A09 DIARRHEA OF INFECTIOUS ORIGIN: ICD-10-CM

## 2018-01-09 DIAGNOSIS — R11.2 NON-INTRACTABLE VOMITING WITH NAUSEA, UNSPECIFIED VOMITING TYPE: Primary | ICD-10-CM

## 2018-01-09 DIAGNOSIS — R10.84 ABDOMINAL PAIN, GENERALIZED: ICD-10-CM

## 2018-01-09 LAB
ALBUMIN SERPL-MCNC: 4.2 G/DL (ref 3.5–5)
ALBUMIN/GLOB SERPL: 1.1 {RATIO} (ref 1.1–2.2)
ALP SERPL-CCNC: 77 U/L (ref 45–117)
ALT SERPL-CCNC: 32 U/L (ref 12–78)
ANION GAP SERPL CALC-SCNC: 9 MMOL/L (ref 5–15)
APPEARANCE UR: CLEAR
AST SERPL-CCNC: 26 U/L (ref 15–37)
BACTERIA URNS QL MICRO: ABNORMAL /HPF
BASOPHILS # BLD: 0 K/UL (ref 0–0.1)
BASOPHILS NFR BLD: 0 % (ref 0–1)
BILIRUB SERPL-MCNC: 0.4 MG/DL (ref 0.2–1)
BILIRUB UR QL: NEGATIVE
BUN SERPL-MCNC: 25 MG/DL (ref 6–20)
BUN/CREAT SERPL: 34 (ref 12–20)
CALCIUM SERPL-MCNC: 8.9 MG/DL (ref 8.5–10.1)
CHLORIDE SERPL-SCNC: 104 MMOL/L (ref 97–108)
CO2 SERPL-SCNC: 26 MMOL/L (ref 21–32)
COLOR UR: ABNORMAL
CREAT SERPL-MCNC: 0.73 MG/DL (ref 0.55–1.02)
DIFFERENTIAL METHOD BLD: ABNORMAL
EOSINOPHIL # BLD: 0 K/UL (ref 0–0.4)
EOSINOPHIL NFR BLD: 0 % (ref 0–7)
EPITH CASTS URNS QL MICRO: ABNORMAL /LPF
ERYTHROCYTE [DISTWIDTH] IN BLOOD BY AUTOMATED COUNT: 13.9 % (ref 11.5–14.5)
GLOBULIN SER CALC-MCNC: 4 G/DL (ref 2–4)
GLUCOSE SERPL-MCNC: 128 MG/DL (ref 65–100)
GLUCOSE UR STRIP.AUTO-MCNC: NEGATIVE MG/DL
HCG UR QL: NEGATIVE
HCT VFR BLD AUTO: 40.1 % (ref 35–47)
HGB BLD-MCNC: 12.9 G/DL (ref 11.5–16)
HGB UR QL STRIP: NEGATIVE
KETONES UR QL STRIP.AUTO: NEGATIVE MG/DL
LEUKOCYTE ESTERASE UR QL STRIP.AUTO: NEGATIVE
LIPASE SERPL-CCNC: 63 U/L (ref 73–393)
LYMPHOCYTES # BLD: 0.3 K/UL (ref 0.8–3.5)
LYMPHOCYTES NFR BLD: 2 % (ref 12–49)
MCH RBC QN AUTO: 27.9 PG (ref 26–34)
MCHC RBC AUTO-ENTMCNC: 32.2 G/DL (ref 30–36.5)
MCV RBC AUTO: 86.6 FL (ref 80–99)
MONOCYTES # BLD: 0.3 K/UL (ref 0–1)
MONOCYTES NFR BLD: 2 % (ref 5–13)
MUCOUS THREADS URNS QL MICRO: ABNORMAL /LPF
NEUTS SEG # BLD: 16.5 K/UL (ref 1.8–8)
NEUTS SEG NFR BLD: 96 % (ref 32–75)
NITRITE UR QL STRIP.AUTO: NEGATIVE
PH UR STRIP: 7 [PH] (ref 5–8)
PLATELET # BLD AUTO: 308 K/UL (ref 150–400)
POTASSIUM SERPL-SCNC: 4.3 MMOL/L (ref 3.5–5.1)
PROT SERPL-MCNC: 8.2 G/DL (ref 6.4–8.2)
PROT UR STRIP-MCNC: NEGATIVE MG/DL
RBC # BLD AUTO: 4.63 M/UL (ref 3.8–5.2)
RBC #/AREA URNS HPF: ABNORMAL /HPF (ref 0–5)
RBC MORPH BLD: ABNORMAL
SODIUM SERPL-SCNC: 139 MMOL/L (ref 136–145)
SP GR UR REFRACTOMETRY: 1.02 (ref 1–1.03)
UR CULT HOLD, URHOLD: NORMAL
UROBILINOGEN UR QL STRIP.AUTO: 0.2 EU/DL (ref 0.2–1)
WBC # BLD AUTO: 17.1 K/UL (ref 3.6–11)
WBC URNS QL MICRO: ABNORMAL /HPF (ref 0–4)

## 2018-01-09 PROCEDURE — 81025 URINE PREGNANCY TEST: CPT

## 2018-01-09 PROCEDURE — 80053 COMPREHEN METABOLIC PANEL: CPT | Performed by: EMERGENCY MEDICINE

## 2018-01-09 PROCEDURE — 83690 ASSAY OF LIPASE: CPT | Performed by: EMERGENCY MEDICINE

## 2018-01-09 PROCEDURE — 96361 HYDRATE IV INFUSION ADD-ON: CPT

## 2018-01-09 PROCEDURE — 96375 TX/PRO/DX INJ NEW DRUG ADDON: CPT

## 2018-01-09 PROCEDURE — 81001 URINALYSIS AUTO W/SCOPE: CPT | Performed by: EMERGENCY MEDICINE

## 2018-01-09 PROCEDURE — 36415 COLL VENOUS BLD VENIPUNCTURE: CPT | Performed by: EMERGENCY MEDICINE

## 2018-01-09 PROCEDURE — 85025 COMPLETE CBC W/AUTO DIFF WBC: CPT | Performed by: EMERGENCY MEDICINE

## 2018-01-09 PROCEDURE — 74011250636 HC RX REV CODE- 250/636: Performed by: EMERGENCY MEDICINE

## 2018-01-09 PROCEDURE — 96374 THER/PROPH/DIAG INJ IV PUSH: CPT

## 2018-01-09 PROCEDURE — 99283 EMERGENCY DEPT VISIT LOW MDM: CPT

## 2018-01-09 RX ORDER — DIPHENHYDRAMINE HYDROCHLORIDE 50 MG/ML
25 INJECTION, SOLUTION INTRAMUSCULAR; INTRAVENOUS
Status: COMPLETED | OUTPATIENT
Start: 2018-01-09 | End: 2018-01-09

## 2018-01-09 RX ORDER — PROMETHAZINE HYDROCHLORIDE 25 MG/1
25 TABLET ORAL
Qty: 12 TAB | Refills: 0 | Status: SHIPPED | OUTPATIENT
Start: 2018-01-09 | End: 2018-06-29 | Stop reason: ALTCHOICE

## 2018-01-09 RX ORDER — PROCHLORPERAZINE EDISYLATE 5 MG/ML
5 INJECTION INTRAMUSCULAR; INTRAVENOUS
Status: COMPLETED | OUTPATIENT
Start: 2018-01-09 | End: 2018-01-09

## 2018-01-09 RX ADMIN — DIPHENHYDRAMINE HYDROCHLORIDE 25 MG: 50 INJECTION, SOLUTION INTRAMUSCULAR; INTRAVENOUS at 11:02

## 2018-01-09 RX ADMIN — SODIUM CHLORIDE 1000 ML: 900 INJECTION, SOLUTION INTRAVENOUS at 11:01

## 2018-01-09 RX ADMIN — PROCHLORPERAZINE EDISYLATE 5 MG: 5 INJECTION INTRAMUSCULAR; INTRAVENOUS at 11:03

## 2018-01-09 NOTE — ED NOTES
The patient was discharged home by Dr. Marika Isaacs  in stable condition. The patient is alert and oriented, in no respiratory distress and discharge vital signs obtained. The patient's diagnosis, condition and treatment were explained. The patient expressed understanding. One prescription given. No work/school note given. A discharge plan has been developed. A  was not involved in the process. Aftercare instructions were given. Pt ambulatory out of the ED with family.

## 2018-01-09 NOTE — DISCHARGE INSTRUCTIONS
Gastroenteritis: Care Instructions  Your Care Instructions    Gastroenteritis is an illness that may cause nausea, vomiting, and diarrhea. It is sometimes called \"stomach flu. \" It can be caused by bacteria or a virus. You will probably begin to feel better in 1 to 2 days. In the meantime, get plenty of rest and make sure you do not become dehydrated. Dehydration occurs when your body loses too much fluid. Follow-up care is a key part of your treatment and safety. Be sure to make and go to all appointments, and call your doctor if you are having problems. It's also a good idea to know your test results and keep a list of the medicines you take. How can you care for yourself at home? · If your doctor prescribed antibiotics, take them as directed. Do not stop taking them just because you feel better. You need to take the full course of antibiotics. · Drink plenty of fluids to prevent dehydration, enough so that your urine is light yellow or clear like water. Choose water and other caffeine-free clear liquids until you feel better. If you have kidney, heart, or liver disease and have to limit fluids, talk with your doctor before you increase your fluid intake. · Drink fluids slowly, in frequent, small amounts, because drinking too much too fast can cause vomiting. · Begin eating mild foods, such as dry toast, yogurt, applesauce, bananas, and rice. Avoid spicy, hot, or high-fat foods, and do not drink alcohol or caffeine for a day or two. Do not drink milk or eat ice cream until you are feeling better. How to prevent gastroenteritis  · Keep hot foods hot and cold foods cold. · Do not eat meats, dressings, salads, or other foods that have been kept at room temperature for more than 2 hours. · Use a thermometer to check your refrigerator. It should be between 34°F and 40°F.  · Defrost meats in the refrigerator or microwave, not on the kitchen counter. · Keep your hands and your kitchen clean.  Wash your hands, cutting boards, and countertops with hot soapy water frequently. · Cook meat until it is well done. · Do not eat raw eggs or uncooked sauces made with raw eggs. · Do not take chances. If food looks or tastes spoiled, throw it out. When should you call for help? Call 911 anytime you think you may need emergency care. For example, call if:  ? · You vomit blood or what looks like coffee grounds. ? · You passed out (lost consciousness). ? · You pass maroon or very bloody stools. ?Call your doctor now or seek immediate medical care if:  ? · You have severe belly pain. ? · You have signs of needing more fluids. You have sunken eyes, a dry mouth, and pass only a little dark urine. ? · You feel like you are going to faint. ? · You have increased belly pain that does not go away in 1 to 2 days. ? · You have new or increased nausea, or you are vomiting. ? · You have a new or higher fever. ? · Your stools are black and tarlike or have streaks of blood. ? Watch closely for changes in your health, and be sure to contact your doctor if:  ? · You are dizzy or lightheaded. ? · You urinate less than usual, or your urine is dark yellow or brown. ? · You do not feel better with each day that goes by. Where can you learn more? Go to http://mamie-sandrita.info/. Enter N142 in the search box to learn more about \"Gastroenteritis: Care Instructions. \"  Current as of: March 3, 2017  Content Version: 11.4  © 4050-9150 [employer]. Care instructions adapted under license by [employer] (which disclaims liability or warranty for this information). If you have questions about a medical condition or this instruction, always ask your healthcare professional. [address] any warranty or liability for your use of this information.          Nausea and Vomiting: Care Instructions  Your Care Instructions    When you are nauseated, you may feel weak and sweaty and notice a lot of saliva in your mouth. Nausea often leads to vomiting. Most of the time you do not need to worry about nausea and vomiting, but they can be signs of other illnesses. Two common causes of nausea and vomiting are stomach flu and food poisoning. Nausea and vomiting from viral stomach flu will usually start to improve within 24 hours. Nausea and vomiting from food poisoning may last from 12 to 48 hours. The doctor has checked you carefully, but problems can develop later. If you notice any problems or new symptoms, get medical treatment right away. Follow-up care is a key part of your treatment and safety. Be sure to make and go to all appointments, and call your doctor if you are having problems. It's also a good idea to know your test results and keep a list of the medicines you take. How can you care for yourself at home? · To prevent dehydration, drink plenty of fluids, enough so that your urine is light yellow or clear like water. Choose water and other caffeine-free clear liquids until you feel better. If you have kidney, heart, or liver disease and have to limit fluids, talk with your doctor before you increase the amount of fluids you drink. · Rest in bed until you feel better. · When you are able to eat, try clear soups, mild foods, and liquids until all symptoms are gone for 12 to 48 hours. Other good choices include dry toast, crackers, cooked cereal, and gelatin dessert, such as Jell-O. When should you call for help? Call 911 anytime you think you may need emergency care. For example, call if:  ? · You passed out (lost consciousness). ?Call your doctor now or seek immediate medical care if:  ? · You have symptoms of dehydration, such as:  ¨ Dry eyes and a dry mouth. ¨ Passing only a little dark urine. ¨ Feeling thirstier than usual.   ? · You have new or worsening belly pain. ? · You have a new or higher fever. ? · You vomit blood or what looks like coffee grounds. ?Watch closely for changes in your health, and be sure to contact your doctor if:  ? · You have ongoing nausea and vomiting. ? · Your vomiting is getting worse. ? · Your vomiting lasts longer than 2 days. ? · You are not getting better as expected. Where can you learn more? Go to http://mamie-sandrita.info/. Enter [id number] in the search box to learn more about \"Nausea and Vomiting: Care Instructions. \"  Current as of: March 20, 2017  Content Version: 11.4  © 6672-5659 [employer]. Care instructions adapted under license by [employer] (which disclaims liability or warranty for this information). If you have questions about a medical condition or this instruction, always ask your healthcare professional. [address] any warranty or liability for your use of this information. Abdominal Pain: Care Instructions  Your Care Instructions    Abdominal pain has many possible causes. Some aren't serious and get better on their own in a few days. Others need more testing and treatment. If your pain continues or gets worse, you need to be rechecked and may need more tests to find out what is wrong. You may need surgery to correct the problem. Don't ignore new symptoms, such as fever, nausea and vomiting, urination problems, pain that gets worse, and dizziness. These may be signs of a more serious problem. Your doctor may have recommended a follow-up visit in the next 8 to 12 hours. If you are not getting better, you may need more tests or treatment. The doctor has checked you carefully, but problems can develop later. If you notice any problems or new symptoms, get medical treatment right away. Follow-up care is a key part of your treatment and safety. Be sure to make and go to all appointments, and call your doctor if you are having problems. It's also a good idea to know your test results and keep a list of the medicines you take.   How can you care for yourself at home? · Rest until you feel better. · To prevent dehydration, drink plenty of fluids, enough so that your urine is light yellow or clear like water. Choose water and other caffeine-free clear liquids until you feel better. If you have kidney, heart, or liver disease and have to limit fluids, talk with your doctor before you increase the amount of fluids you drink. · If your stomach is upset, eat mild foods, such as rice, dry toast or crackers, bananas, and applesauce. Try eating several small meals instead of two or three large ones. · Wait until 48 hours after all symptoms have gone away before you have spicy foods, alcohol, and drinks that contain caffeine. · Do not eat foods that are high in fat. · Avoid anti-inflammatory medicines such as aspirin, ibuprofen (Advil, Motrin), and naproxen (Aleve). These can cause stomach upset. Talk to your doctor if you take daily aspirin for another health problem. When should you call for help? Call 911 anytime you think you may need emergency care. For example, call if:  ? · You passed out (lost consciousness). ? · You pass maroon or very bloody stools. ? · You vomit blood or what looks like coffee grounds. ? · You have new, severe belly pain. ?Call your doctor now or seek immediate medical care if:  ? · Your pain gets worse, especially if it becomes focused in one area of your belly. ? · You have a new or higher fever. ? · Your stools are black and look like tar, or they have streaks of blood. ? · You have unexpected vaginal bleeding. ? · You have symptoms of a urinary tract infection. These may include:  ¨ Pain when you urinate. ¨ Urinating more often than usual.  ¨ Blood in your urine. ? · You are dizzy or lightheaded, or you feel like you may faint. ? Watch closely for changes in your health, and be sure to contact your doctor if:  ? · You are not getting better after 1 day (24 hours). Where can you learn more?   Go to http://mamie-sandrita.info/. Enter L405 in the search box to learn more about \"Abdominal Pain: Care Instructions. \"  Current as of: March 20, 2017  Content Version: 11.4  © 6794-3371 Healthwise, [employer]. Care instructions adapted under license by [employer] (which disclaims liability or warranty for this information). If you have questions about a medical condition or this instruction, always ask your healthcare professional. [place] [address] any warranty or liability for your use of this information.

## 2018-01-09 NOTE — ED NOTES
Pt resting on stretcher, looks sleepy. Pt states her abdominal pain has been resolved at this time. IV fluids infusing without difficulty to gravity. Pt aware that she will be discharged after IV fluids are complete. No additional needs at this time. Call bell in reach.

## 2018-01-11 ENCOUNTER — OFFICE VISIT (OUTPATIENT)
Dept: FAMILY MEDICINE CLINIC | Age: 28
End: 2018-01-11

## 2018-01-11 VITALS
DIASTOLIC BLOOD PRESSURE: 70 MMHG | OXYGEN SATURATION: 98 % | HEART RATE: 88 BPM | BODY MASS INDEX: 32.65 KG/M2 | TEMPERATURE: 98.3 F | SYSTOLIC BLOOD PRESSURE: 102 MMHG | HEIGHT: 62 IN | WEIGHT: 177.4 LBS | RESPIRATION RATE: 20 BRPM

## 2018-01-11 DIAGNOSIS — E06.3 HASHIMOTO'S THYROIDITIS: Primary | ICD-10-CM

## 2018-01-11 DIAGNOSIS — E03.9 ACQUIRED HYPOTHYROIDISM: ICD-10-CM

## 2018-01-11 DIAGNOSIS — D72.829 LEUKOCYTOSIS, UNSPECIFIED TYPE: ICD-10-CM

## 2018-01-11 DIAGNOSIS — R11.0 NAUSEA: ICD-10-CM

## 2018-01-11 DIAGNOSIS — R53.81 MALAISE AND FATIGUE: ICD-10-CM

## 2018-01-11 DIAGNOSIS — F33.1 MODERATE EPISODE OF RECURRENT MAJOR DEPRESSIVE DISORDER (HCC): ICD-10-CM

## 2018-01-11 DIAGNOSIS — R53.83 MALAISE AND FATIGUE: ICD-10-CM

## 2018-01-11 NOTE — PATIENT INSTRUCTIONS

## 2018-01-11 NOTE — MR AVS SNAPSHOT
Visit Information Date & Time Provider Department Dept. Phone Encounter #  
 1/11/2018  1:45 PM Segundo Chakraborty [id number] Follow-up Instructions Return in about 6 months (around 7/11/2018), or if symptoms worsen or fail to improve. Upcoming Health Maintenance Date Due  
 HPV AGE 9Y-34Y (1 of 3 - Female 3 Dose Series) 10/4/2003 Influenza Age 5 to Adult 8/1/2017 PAP AKA CERVICAL CYTOLOGY 5/6/2018 DTaP/Tdap/Td series (2 - Td) 10/12/2025 Allergies as of 1/11/2018  Review Complete On: 1/11/2018 By: Juli Decker MD  
  
 Severity Noted Reaction Type Reactions Ancef [Cefazolin] High 09/28/2015    Hives Given at c/s, swelling of face/hives, tx'd with benadryl Pcn [Penicillins] High 07/29/2011   Systemic Hives Facial edema Peanut Medium 10/13/2014   Systemic Hives Chocolate [Cocoa]  05/06/2015    Hives Citrus And Derivatives  08/31/2015    Nausea and Vomiting Oranges only [place] Low 10/13/2014   Systemic Hives Beef Containing Products Low 10/13/2014   Systemic Hives Chicken Derived Low 10/13/2014   Systemic Hives [place] Low 10/13/2014   Systemic Hives Egg Low 10/13/2014   Systemic Hives Milk Low 10/13/2014   Systemic Hives Milk Prot-turm-pepper-pineappl Low 10/13/2014   Systemic Hives Shellfish Derived Low 10/13/2014   Systemic Hives Soy Low 10/13/2014   Systemic Hives [place] Low 10/13/2014   Systemic Hives Current Immunizations  Never Reviewed Name Date Tdap 10/12/2015 Not reviewed this visit You Were Diagnosed With   
  
 Codes Comments Hashimoto's thyroiditis    -  Primary ICD-10-CM: E06.3 ICD-9-CM: 999. 2 Acquired hypothyroidism     ICD-10-CM: E03.9 ICD-9-CM: 244.9 Nausea     ICD-10-CM: R11.0 ICD-9-CM: 787.02 Leukocytosis, unspecified type     ICD-10-CM: D72.829 ICD-9-CM: 288.60  Malaise and fatigue     ICD-10-CM: R53.81, R53.83 
 ICD-9-CM: 780.79 Moderate episode of recurrent major depressive disorder (HCC)     ICD-10-CM: F33.1 ICD-9-CM: 296.32 Vitals BP Pulse Temp Resp Height(growth percentile) Weight(growth percentile) 102/70 88 98.3 °F (36.8 °C) (Oral) 20 5' 2\" (1.575 m) 177 lb 6.4 oz (80.5 kg) LMP SpO2 BMI OB Status Smoking Status 01/02/2018 98% 32.45 kg/m2 Having regular periods Former Smoker BMI and BSA Data Body Mass Index Body Surface Area  
 32.45 kg/m 2 1.88 m 2 Preferred Pharmacy Pharmacy Name Phone [address] [phone] Your Updated Medication List  
  
   
This list is accurate as of: 1/11/18  2:26 PM.  Always use your most recent med list.  
  
  
  
  
 albuterol 90 mcg/actuation inhaler Commonly known as:  PROVENTIL HFA, VENTOLIN HFA, PROAIR HFA Take 2 Puffs by inhalation every four (4) hours as needed for Wheezing. citalopram 40 mg tablet Commonly known as:  [place] Take 1 Tab by mouth once over twenty-four (24) hours for 180 days. levothyroxine 100 mcg tablet Commonly known as:  SYNTHROID Take 1 Tab by mouth Daily (before breakfast) for 180 days. promethazine 25 mg tablet Commonly known as:  PHENERGAN Take 1 Tab by mouth every six (6) hours as needed for Nausea. We Performed the Following CBC WITH AUTOMATED DIFF [08608 CPT(R)] METABOLIC PANEL, COMPREHENSIVE [28943 CPT(R)] REFERRAL TO PSYCHIATRY [REF91 Custom] Comments:  
 27F with h/o major depression and anxiety. She needs to see a psychiatris/therapist at this time to help with medication management for her recurrent depression. THYROID CASCADE PROFILE [[id number] Custom] Follow-up Instructions Return in about 6 months (around 7/11/2018), or if symptoms worsen or fail to improve. Referral Information Referral ID Referred By Referred To [id number] [facility], [address] [place] [address] [place], [place]   
   [address] [place] [address] [place] Rin Vasquez Phone: [phone] Fax: [phone] Visits Status Start Date End Date 1 New Request 1/11/18 1/11/19 If your referral has a status of pending review or denied, additional information will be sent to support the outcome of this decision. Patient Instructions Hypothyroidism: Care Instructions Your Care Instructions You have hypothyroidism, which means that your body is not making enough thyroid hormone. This hormone helps your body use energy. If your thyroid level is low, you may feel tired, be constipated, have an increase in your blood pressure, or have dry skin or memory problems. You may also get cold easily, even when it is warm. Women with low thyroid levels may have heavy menstrual periods. A blood test to find your thyroid-stimulating hormone (TSH) level is used to check for hypothyroidism. A high TSH level may mean that you have low thyroid. When your body is not making enough thyroid hormone, TSH levels rise in an effort to make the body produce more. The treatment for hypothyroidism is to take thyroid hormone pills. You should start to feel better in 1 to 2 weeks. But it can take several months to see changes in the TSH level. You will need regular visits with your doctor to make sure you have the right dose of medicine. Most people need treatment for the rest of their lives. You will need to see your doctor regularly to have blood tests and to make sure you are doing well. Follow-up care is a key part of your treatment and safety. Be sure to make and go to all appointments, and call your doctor if you are having problems. It's also a good idea to know your test results and keep a list of the medicines you take. How can you care for yourself at home? · Take your thyroid hormone medicine exactly as prescribed.  Call your doctor if you think you are having a problem with your medicine. Most people do not have side effects if they take the right amount of medicine regularly. ¨ Take the medicine 30 minutes before breakfast, and do not take it with calcium, vitamins, or iron. ¨ Do not take extra doses of your thyroid medicine. It will not help you get better any faster, and it may cause side effects. ¨ If you forget to take a dose, do NOT take a double dose of medicine. Take your usual dose the next day. · Tell your doctor about all prescription, herbal, or over-the-counter products you take. · Take care of yourself. Eat a healthy diet, get enough sleep, and get regular exercise. When should you call for help? Call 911 anytime you think you may need emergency care. For example, call if: 
? · You passed out (lost consciousness). ? · You have severe trouble breathing. ? · You have a very slow heartbeat (less than 60 beats a minute). ? · You have a low body temperature (95°F or below). ?Call your doctor now or seek immediate medical care if: 
? · You feel tired, sluggish, or weak. ? · You have trouble remembering things or concentrating. ? · You do not begin to feel better 2 weeks after starting your medicine. ? Watch closely for changes in your health, and be sure to contact your doctor if you have any problems. Where can you learn more? Go to http://mamie-sandrita.info/. Enter R910 in the search box to learn more about \"Hypothyroidism: Care Instructions. \" Current as of: May 12, 2017 Content Version: 11.4 © 0442-4280 [employer]. Care instructions adapted under license by [employer] (which disclaims liability or warranty for this information). If you have questions about a medical condition or this instruction, always ask your healthcare professional. [address] any warranty or liability for your use of this information. Introducing [facility]! Dear Ean: Thank you for requesting a Dial a Dealer account. Our records indicate that you already have an active Dial a Dealer account. You can access your account anytime at [email or web address]. [email or web address] Did you know that you can access your hospital and ER discharge instructions at any time in Dial a Dealer? You can also review all of your test results from your hospital stay or ER visit. Additional Information If you have questions, please visit the Frequently Asked Questions section of the Dial a Dealer website at [email or web address] Remember, Dial a Dealer is NOT to be used for urgent needs. For medical emergencies, dial 911. Now available from your iPhone and Android! Please provide this summary of care documentation to your next provider. Your primary care clinician is listed as [address]. If you have any questions after today's visit, please call [phone].

## 2018-01-11 NOTE — PROGRESS NOTES
Subjective:   27F with h/o hypothyroidism, depression and multiple food allergies presents today for routine follow up. She needs to have her labs done and also has 2-new concerns. First, she feels like her emotions are all over the place. Her depression seems to be worse than it had been. She is not actively suicidal, but asks the question - \"would it be better if I was not here. \"  She states that preventing this from moving further is her daughter who is 3years old. 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 This job has been stressful. She is in a monogamous relationship and . She has had some discord in her marriage, but is not abused. Patient Active Problem List    Diagnosis Date Noted    Previous  delivery affecting pregnancy 2015    Pregnancy 2015    Abnormal chromosomal and genetic finding on  screening of mother 10/26/2015    H/O:  2015    Supervision of other normal pregnancy 2015    H/O idiopathic urticaria 2012    Hashimoto's thyroiditis 10/24/2011    Hypothyroid 2011    Multiple allergies 2011     Current Outpatient Prescriptions   Medication Sig Dispense Refill    citalopram (CELEXA) 40 mg tablet Take 1 Tab by mouth once over twenty-four (24) hours for 180 days. 90 Tab 1    levothyroxine (SYNTHROID) 100 mcg tablet Take 1 Tab by mouth Daily (before breakfast) for 180 days. 90 Tab 1    promethazine (PHENERGAN) 25 mg tablet Take 1 Tab by mouth every six (6) hours as needed for Nausea. 12 Tab 0    albuterol (PROVENTIL HFA, VENTOLIN HFA, PROAIR HFA) 90 mcg/actuation inhaler Take 2 Puffs by inhalation every four (4) hours as needed for Wheezing.  1 Inhaler 0     Allergies   Allergen Reactions    Ancef [Cefazolin] Hives     Given at c/s, swelling of face/hives, tx'd with benadryl    Pcn [Penicillins] Hives     Facial edema     Peanut Hives    Chocolate [Cocoa] Hives    Citrus And Derivatives Nausea and Vomiting     Oranges only    [place] Hives    Beef Containing Products Hives    Chicken Derived Hives    Corn Hives    Egg Hives    Milk Hives    Milk Prot-Turm-Pepper-Pineappl Hives    Shellfish Derived Hives    Soy Hives    Strawberry Hives     Past Medical History:   Diagnosis Date    Environmental allergies     GERD (gastroesophageal reflux disease)     Hashimoto's thyroiditis 10/24/2011    Hashimoto's thyroiditis 10/24/2011    Hypotension     Hypothyroid 2011    Hypothyroidism     Psychiatric disorder     depression --2 yr old daughter  2013    Unspecified adverse effect of anesthesia     per patient with epidural had severe N&V and passed out     Past Surgical History:   Procedure Laterality Date    DELIVERY   11    1 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[facility], congenital anomaly    HX  SECTION  2016     Family History   Problem Relation Age of Onset    Heart Disease Mother      miltral value prolapse    Thyroid Disease Maternal Grandmother     Diabetes Paternal Grandmother     Diabetes Maternal Aunt     Cancer Paternal Grandfather      throat/stomach cancer,  at age 72    Breast Cancer Other      maternal great grandmother     Social History   Substance Use Topics    Smoking status: Former Smoker    Smokeless tobacco: Never Used    Alcohol use No      Comment: Not while pregnant      Review of Systems  Pertinent items are noted in HPI.      Objective:     Visit Vitals    /70    Pulse 88    Temp 98.3 °F (36.8 °C) (Oral)    Resp 20    Ht 5' 2\" (1.575 m)    Wt 177 lb 6.4 oz (80.5 kg)    LMP 2018    SpO2 98%    BMI 32.45 kg/m2      General appearance: alert, cooperative, no distress, appears stated age  Neck: supple, symmetrical, trachea midline, no adenopathy, thyroid: not enlarged, symmetric, no tenderness/mass/nodules, no carotid bruit and no JVD  Lungs: clear to auscultation bilaterally  Heart: regular rate and rhythm, S1, S2 normal, no murmur, click, rub or gallop  Extremities: extremities normal, atraumatic, no cyanosis or edema  Skin: Skin color, texture, turgor normal. No rashes or lesions  Neurologic: Grossly normal    Assessment/Plan:       ICD-10-CM ICD-9-CM    1. Hashimoto's thyroiditis E06.3 245.2 THYROID CASCADE PROFILE   2. Acquired hypothyroidism E03.9 244.9    3. Nausea R11.0 787.02    4. Leukocytosis, unspecified type D72.829 288.60 CBC WITH AUTOMATED DIFF   5. Malaise and fatigue J37.41 701.39 METABOLIC PANEL, COMPREHENSIVE    R53.83  CBC WITH AUTOMATED DIFF      REFERRAL TO PSYCHIATRY   6. Moderate episode of recurrent major depressive disorder (HCC) F33.1 296.32 REFERRAL TO PSYCHIATRY     Will send for TSH and follow up of thyroid. In addition, long discussion about depression and I have given her a referral to psychiatry. She has had long term depression and social situation is very stressful. · Patient Education:  Reviewed concept of anxiety as biochemical imbalance of neurotransmitters and rationale for treatment. Instructed patient to contact office or on-call physician promptly should condition worsen or any new symptoms appear and provided on-call telephone numbers. IF THE PATIENT HAS ANY SUICIDAL OR HOMICIDAL IDEATION, CALL THE OFFICE, DISCUSS WITH A SUPPORT MEMBER OR GO TO THE ER IMMEDIATELY. Patient was agreeable with this plan. I have discussed the diagnosis with the patient and the intended treatment plan as seen in the above orders. The patient has received an after-visit summary and questions were answered concerning future plans. Asked to return should symptoms worsen or not improve with treatment. Any pending labs and studies will be relayed to patient when they become available. Pt verbalizes understanding of plan of care and denies further questions or concerns at this time.      Follow-up Disposition:  Return in about 6 months (around 7/11/2018), or if symptoms worsen or fail to improve.

## 2018-01-11 NOTE — PROGRESS NOTES
Identified pt with two pt identifiers(name and ).     Chief Complaint   Patient presents with    Depression     Feeling hopeless more than half the days    Flank Pain     sharp piercing pain under left ribs, last a day and just goes and comes        Health Maintenance Due   Topic    HPV AGE 9Y-26Y (1 of 3 - Female 3 Dose Series)    Influenza Age 5 to Adult     PAP AKA CERVICAL CYTOLOGY        Wt Readings from Last 3 Encounters:   18 177 lb 6.4 oz (80.5 kg)   18 180 lb (81.6 kg)   17 180 lb (81.6 kg)     Temp Readings from Last 3 Encounters:   18 98.3 °F (36.8 °C) (Oral)   18 97.7 °F (36.5 °C)   17 98.5 °F (36.9 °C) (Oral)     BP Readings from Last 3 Encounters:   18 102/70   18 99/61   17 96/60     Pulse Readings from Last 3 Encounters:   18 88   18 86   17 86         Learning Assessment:  :     Learning Assessment 2015 2014 3/21/2014   PRIMARY LEARNER Patient Patient Patient   HIGHEST LEVEL OF EDUCATION - PRIMARY LEARNER  GRADUATED HIGH SCHOOL OR GED GRADUATED HIGH SCHOOL OR GED GRADUATED HIGH SCHOOL OR GED   BARRIERS PRIMARY LEARNER NONE NONE NONE   CO-LEARNER CAREGIVER No No -   PRIMARY LANGUAGE ENGLISH ENGLISH ENGLISH   LEARNER PREFERENCE PRIMARY READING DEMONSTRATION DEMONSTRATION   ANSWERED BY Patient patient patient   RELATIONSHIP SELF SELF SELF       Depression Screening:  :     PHQ over the last two weeks 2018   Little interest or pleasure in doing things More than half the days   Feeling down, depressed or hopeless More than half the days   Total Score PHQ 2 4   Trouble falling or staying asleep, or sleeping too much -   Feeling tired or having little energy -   Poor appetite or overeating -   Feeling bad about yourself - or that you are a failure or have let yourself or your family down -   Trouble concentrating on things such as school, work, reading or watching TV -   Moving or speaking so slowly that other people could have noticed; or the opposite being so fidgety that others notice -   Thoughts of being better off dead, or hurting yourself in some way -   PHQ 9 Score -   How difficult have these problems made it for you to do your work, take care of your home and get along with others -       Fall Risk Assessment:  :     No flowsheet data found. Abuse Screening:  :     Abuse Screening Questionnaire 10/27/2016 5/22/2014   Do you ever feel afraid of your partner? N N   Are you in a relationship with someone who physically or mentally threatens you? N N   Is it safe for you to go home? Y Y       Coordination of Care Questionnaire:  :     1) Have you been to an emergency room, urgent care clinic since your last visit? yes [facility]  Hospitalized since your last visit? no             2) Have you seen or consulted any other health care providers outside of [address] [place] [address] since your last visit? no  (Include any pap smears or colon screenings in this section.)    3) Do you have an Advance Directive on file? no  Are you interested in receiving information about Advance Directives? no    Reviewed record in preparation for visit and have obtained necessary documentation. Medication reconciliation up to date and corrected with patient at this time.

## 2018-01-13 LAB
ALBUMIN SERPL-MCNC: 4.4 G/DL (ref 3.5–5.5)
ALBUMIN/GLOB SERPL: 1.6 {RATIO} (ref 1.2–2.2)
ALP SERPL-CCNC: 55 IU/L (ref 39–117)
ALT SERPL-CCNC: 17 IU/L (ref 0–32)
AST SERPL-CCNC: 20 IU/L (ref 0–40)
BASOPHILS # BLD AUTO: 0 X10E3/UL (ref 0–0.2)
BASOPHILS NFR BLD AUTO: 1 %
BILIRUB SERPL-MCNC: 0.2 MG/DL (ref 0–1.2)
BUN SERPL-MCNC: 9 MG/DL (ref 6–20)
BUN/CREAT SERPL: 14 (ref 9–23)
CALCIUM SERPL-MCNC: 9.2 MG/DL (ref 8.7–10.2)
CHLORIDE SERPL-SCNC: 101 MMOL/L (ref 96–106)
CO2 SERPL-SCNC: 25 MMOL/L (ref 18–29)
CREAT SERPL-MCNC: 0.63 MG/DL (ref 0.57–1)
EOSINOPHIL # BLD AUTO: 0.1 X10E3/UL (ref 0–0.4)
EOSINOPHIL NFR BLD AUTO: 1 %
ERYTHROCYTE [DISTWIDTH] IN BLOOD BY AUTOMATED COUNT: 13.8 % (ref 12.3–15.4)
GLOBULIN SER CALC-MCNC: 2.8 G/DL (ref 1.5–4.5)
GLUCOSE SERPL-MCNC: 79 MG/DL (ref 65–99)
HCT VFR BLD AUTO: 37.5 % (ref 34–46.6)
HGB BLD-MCNC: 12 G/DL (ref 11.1–15.9)
IMM GRANULOCYTES # BLD: 0 X10E3/UL (ref 0–0.1)
IMM GRANULOCYTES NFR BLD: 0 %
INTERPRETIVE COMMENT, 330017: ABNORMAL
LYMPHOCYTES # BLD AUTO: 1.2 X10E3/UL (ref 0.7–3.1)
LYMPHOCYTES NFR BLD AUTO: 18 %
MCH RBC QN AUTO: 27.9 PG (ref 26.6–33)
MCHC RBC AUTO-ENTMCNC: 32 G/DL (ref 31.5–35.7)
MCV RBC AUTO: 87 FL (ref 79–97)
MONOCYTES # BLD AUTO: 0.7 X10E3/UL (ref 0.1–0.9)
MONOCYTES NFR BLD AUTO: 10 %
NEUTROPHILS # BLD AUTO: 4.6 X10E3/UL (ref 1.4–7)
NEUTROPHILS NFR BLD AUTO: 70 %
PLATELET # BLD AUTO: 296 X10E3/UL (ref 150–379)
POTASSIUM SERPL-SCNC: 4.1 MMOL/L (ref 3.5–5.2)
PROT SERPL-MCNC: 7.2 G/DL (ref 6–8.5)
RBC # BLD AUTO: 4.3 X10E6/UL (ref 3.77–5.28)
SODIUM SERPL-SCNC: 140 MMOL/L (ref 134–144)
T4 FREE SERPL-MCNC: 1.07 NG/DL (ref 0.82–1.77)
THYROPEROXIDASE AB SERPL-ACNC: 351 IU/ML (ref 0–34)
TSH SERPL DL<=0.005 MIU/L-ACNC: 11.98 UIU/ML (ref 0.45–4.5)
WBC # BLD AUTO: 6.6 X10E3/UL (ref 3.4–10.8)

## 2018-01-15 RX ORDER — LEVOTHYROXINE SODIUM 125 UG/1
125 TABLET ORAL
Qty: 30 TAB | Refills: 1 | Status: SHIPPED | OUTPATIENT
Start: 2018-01-15 | End: 2018-04-03 | Stop reason: SDUPTHER

## 2018-01-15 NOTE — PROGRESS NOTES
Patient is once again, very hypothyroid. If she has not been taking her medications as outlined, then this is the reason. If she has, then we need to increase her thyroid medication from 100-125 mcgs per day and recheck in 1-month. Make sure she is taking the medication 60-minutes before eating or 120 minutes (2-hours) after eating. Must be on an empty stomach.

## 2018-01-15 NOTE — PROGRESS NOTES
Pt was advised and verbalized understanding. She has been taking her medication regularly and is aware of need to increase this dose and recheck in one month.

## 2018-01-15 NOTE — TELEPHONE ENCOUNTER
Notes Recorded by Sameera Dash MD on 1/15/2018 at 11:33 AM  Patient is once again, very hypothyroid. If she has not been taking her medications as outlined, then this is the reason. If she has, then we need to increase her thyroid medication from 100-125 mcgs per day and recheck in 1-month. Make sure she is taking the medication 60-minutes before eating or 120 minutes (2-hours) after eating. Must be on an empty stomach.

## 2018-02-20 ENCOUNTER — OFFICE VISIT (OUTPATIENT)
Dept: FAMILY MEDICINE CLINIC | Age: 28
End: 2018-02-20

## 2018-02-20 VITALS
BODY MASS INDEX: 33.31 KG/M2 | HEIGHT: 62 IN | HEART RATE: 84 BPM | WEIGHT: 181 LBS | OXYGEN SATURATION: 98 % | DIASTOLIC BLOOD PRESSURE: 74 MMHG | RESPIRATION RATE: 16 BRPM | SYSTOLIC BLOOD PRESSURE: 106 MMHG | TEMPERATURE: 98.2 F

## 2018-02-20 DIAGNOSIS — R05.9 COUGH: Primary | ICD-10-CM

## 2018-02-20 DIAGNOSIS — J02.9 SORE THROAT: ICD-10-CM

## 2018-02-20 LAB
S PYO AG THROAT QL: NEGATIVE
VALID INTERNAL CONTROL?: YES

## 2018-02-20 RX ORDER — BENZONATATE 100 MG/1
100 CAPSULE ORAL
Qty: 21 CAP | Refills: 0 | Status: SHIPPED | OUTPATIENT
Start: 2018-02-20 | End: 2018-02-27

## 2018-02-20 RX ORDER — ALBUTEROL SULFATE 90 UG/1
2 AEROSOL, METERED RESPIRATORY (INHALATION)
Qty: 1 INHALER | Refills: 1 | Status: SHIPPED | OUTPATIENT
Start: 2018-02-20 | End: 2018-06-29 | Stop reason: ALTCHOICE

## 2018-02-20 NOTE — PATIENT INSTRUCTIONS

## 2018-02-20 NOTE — PROGRESS NOTES
Chief Complaint   Patient presents with    Sore Throat     pt states she woke this morning and her throat feels swollen and she was wheezing    Cough     occasional cough since yesterday     \"REVIEWED RECORD IN PREPARATION FOR VISIT AND HAVE OBTAINED THE NECESSARY DOCUMENTATION\"  1. Have you been to the ER, urgent care clinic since your last visit? Hospitalized since your last visit? No    2. Have you seen or consulted any other health care providers outside of the [address] [place] [address] since your last visit? Include any pap smears or colon screening. No  Patient does not have advanced directives.

## 2018-02-20 NOTE — MR AVS SNAPSHOT
[address] [facility] 
[phone] Patient: Alice Roman MRN: [id number] Visit Information Date & Time Provider Department Dept. Phone Encounter #  
 2018  9:15 AM Segundo King Yuan [phone] [id number] Follow-up Instructions Return if symptoms worsen or fail to improve. Upcoming Health Maintenance Date Due  
 HPV AGE 9Y-34Y (1 of 3 - Female 3 Dose Series) 10/4/2003 Pneumococcal 19-64 Highest Risk (1 of 3 - PCV13) 10/4/2011 Influenza Age 5 to Adult 2017 PAP AKA CERVICAL CYTOLOGY 2018 DTaP/Tdap/Td series (2 - Td) 10/12/2025 Allergies as of 2018  Review Complete On: 2018 By: Tere Madrigal, NP Severity Noted Reaction Type Reactions Ancef [Cefazolin] High 2015    Hives Given at c/s, swelling of face/hives, tx'd with benadryl Pcn [Penicillins] High 2011   Systemic Hives Facial edema Peanut Medium 10/13/2014   Systemic Hives Chocolate [Cocoa]  2015    Hives Citrus And Derivatives  2015    Nausea and Vomiting Oranges only [place] Low 10/13/2014   Systemic Hives Beef Containing Products Low 10/13/2014   Systemic Hives Chicken Derived Low 10/13/2014   Systemic Hives [place] Low 10/13/2014   Systemic Hives Egg Low 10/13/2014   Systemic Hives Milk Low 10/13/2014   Systemic Hives Milk Prot-turm-pepper-pineappl Low 10/13/2014   Systemic Hives Shellfish Derived Low 10/13/2014   Systemic Hives Soy Low 10/13/2014   Systemic Hives [place] Low 10/13/2014   Systemic Hives Current Immunizations  Never Reviewed Name Date Tdap 10/12/2015 Not reviewed this visit You Were Diagnosed With   
  
 Codes Comments Cough    -  Primary ICD-10-CM: O20 ICD-9-CM: 786.2 Sore throat     ICD-10-CM: J02.9 ICD-9-CM: 092 Vitals BP Pulse Temp Resp Height(growth percentile) Weight(growth percentile) 106/74 84 98.2 °F (36.8 °C) (Oral) 16 5' 2\" (1.575 m) 181 lb (82.1 kg) LMP SpO2 BMI OB Status Smoking Status 02/02/2018 98% 33.11 kg/m2 Having regular periods Former Smoker BMI and BSA Data Body Mass Index Body Surface Area  
 33.11 kg/m 2 1.9 m 2 Preferred Pharmacy Pharmacy Name Phone [address] [place] [address] [phone] Your Updated Medication List  
  
   
This list is accurate as of: 2/20/18  9:26 AM.  Always use your most recent med list.  
  
  
  
  
 albuterol 90 mcg/actuation inhaler Commonly known as:  PROVENTIL HFA, VENTOLIN HFA, PROAIR HFA Take 2 Puffs by inhalation every four (4) hours as needed for Wheezing. benzonatate 100 mg capsule Commonly known as:  TESSALON PERLES Take 1 Cap by mouth three (3) times daily as needed for Cough for up to 7 days. citalopram 40 mg tablet Commonly known as:  Marijane Chasten Take 1 Tab by mouth once over twenty-four (24) hours for 180 days. levothyroxine 125 mcg tablet Commonly known as:  SYNTHROID Take 1 Tab by mouth Daily (before breakfast). promethazine 25 mg tablet Commonly known as:  PHENERGAN Take 1 Tab by mouth every six (6) hours as needed for Nausea. Prescriptions Sent to Pharmacy Refills  
 albuterol (PROVENTIL HFA, VENTOLIN HFA, PROAIR HFA) 90 mcg/actuation inhaler 1 Sig: Take 2 Puffs by inhalation every four (4) hours as needed for Wheezing. Class: Normal  
 Pharmacy: [facility] [address]AC[address] Ph #: [phone] Route: Inhalation  
 benzonatate (TESSALON PERLES) 100 mg capsule 0 Sig: Take 1 Cap by mouth three (3) times daily as needed for Cough for up to 7 days. Class: Normal  
 Pharmacy: [facility] DR GIACOMO NAVA [address] [place] [address] Ph #: [phone]  Route: Oral  
  
 We Performed the Following AMB POC RAPID STREP A [51002 CPT(R)] Follow-up Instructions Return if symptoms worsen or fail to improve. Patient Instructions Cough: Care Instructions Your Care Instructions A cough is your body's response to something that bothers your throat or airways. Many things can cause a cough. You might cough because of a cold or the flu, bronchitis, or asthma. Smoking, postnasal drip, allergies, and stomach acid that backs up into your throat also can cause coughs. A cough is a symptom, not a disease. Most coughs stop when the cause, such as a cold, goes away. You can take a few steps at home to cough less and feel better. Follow-up care is a key part of your treatment and safety. Be sure to make and go to all appointments, and call your doctor if you are having problems. It's also a good idea to know your test results and keep a list of the medicines you take. How can you care for yourself at home? · Drink lots of water and other fluids. This helps thin the mucus and soothes a dry or sore throat. Honey or lemon juice in hot water or tea may ease a dry cough. · Take cough medicine as directed by your doctor. · Prop up your head on pillows to help you breathe and ease a dry cough. · Try cough drops to soothe a dry or sore throat. Cough drops don't stop a cough. Medicine-flavored cough drops are no better than candy-flavored drops or hard candy. · Do not smoke. Avoid secondhand smoke. If you need help quitting, talk to your doctor about stop-smoking programs and medicines. These can increase your chances of quitting for good. When should you call for help? Call 911 anytime you think you may need emergency care. For example, call if: 
? · You have severe trouble breathing. ?Call your doctor now or seek immediate medical care if: 
? · You cough up blood. ? · You have new or worse trouble breathing. ? · You have a new or higher fever. ? · You have a new rash. ? Watch closely for changes in your health, and be sure to contact your doctor if: 
? · You cough more deeply or more often, especially if you notice more mucus or a change in the color of your mucus. ? · You have new symptoms, such as a sore throat, an earache, or sinus pain. ? · You do not get better as expected. Where can you learn more? Go to http://mamie-sandrita.info/. Enter D279 in the search box to learn more about \"Cough: Care Instructions. \" Current as of: May 12, 2017 Content Version: 11.4 © 1244-9941 [employer]. Care instructions adapted under license by [employer] (which disclaims liability or warranty for this information). If you have questions about a medical condition or this instruction, always ask your healthcare professional. [address] any warranty or liability for your use of this information. Introducing Miguel! Dear Jose Pelletier: Thank you for requesting a [employer] account. Our records indicate that you already have an active [employer] account. You can access your account anytime at [email or web address] Did you know that you can access your hospital and ER discharge instructions at any time in [employer]? You can also review all of your test results from your hospital stay or ER visit. Additional Information If you have questions, please visit the Frequently Asked Questions section of the [employer] website at [email or web address] Remember, [employer] is NOT to be used for urgent needs. For medical emergencies, dial 911. Now available from your iPhone and Android! Please provide this summary of care documentation to your next provider. Your primary care clinician is listed as [address]. If you have any questions after today's visit, please call [phone].

## 2018-02-20 NOTE — PROGRESS NOTES
HISTORY OF PRESENT ILLNESS  Justine Dacosta is a 24 y.o. female. HPI  Pt presents with \"sore throat and cough\"    Symptoms started last night  Swollen/sore throat  Cough: dry cough  Wheezing noted at times with the cough  Fever: none  No nausea, no vomiting, no diarrhea  OTC: none  Review of Systems   Constitutional: Negative for fever. HENT: Positive for sore throat. Negative for congestion. Respiratory: Positive for cough and wheezing. Negative for sputum production. Gastrointestinal: Negative for abdominal pain, diarrhea, nausea and vomiting. Physical Exam   Constitutional: She is oriented to person, place, and time. She appears well-developed and well-nourished. HENT:   Head: Normocephalic and atraumatic. Right Ear: Hearing, tympanic membrane, external ear and ear canal normal.   Left Ear: Hearing, tympanic membrane, external ear and ear canal normal.   Nose: Mucosal edema present. Mouth/Throat: Posterior oropharyngeal erythema present. Neck: Normal range of motion. Neck supple. Cardiovascular: Normal rate, regular rhythm and normal heart sounds. Pulmonary/Chest: Effort normal and breath sounds normal. No respiratory distress. She has no wheezes. She has no rales. She exhibits no tenderness. Lymphadenopathy:     She has no cervical adenopathy. Neurological: She is alert and oriented to person, place, and time. Skin: Skin is warm and dry. Psychiatric: She has a normal mood and affect. Her behavior is normal.       ASSESSMENT and PLAN    ICD-10-CM ICD-9-CM    1. Cough R05 786.2 albuterol (PROVENTIL HFA, VENTOLIN HFA, PROAIR HFA) 90 mcg/actuation inhaler      benzonatate (TESSALON PERLES) 100 mg capsule   2. Sore throat J02.9 462 AMB POC RAPID STREP A     Informed patient that I have sent medication to the pharmacy, and she should take as prescribed. Educated about staying well hydrated, and taking Mucinex as needed for congestion.     Pt informed to return to office with worsening of symptoms, or PRN with any questions or concerns. Pt verbalizes understanding of plan of care and denies further questions or concerns at this time.

## 2018-02-20 NOTE — LETTER
NOTIFICATION RETURN TO WORK / SCHOOL 
 
2/20/2018 9:25 AM 
 
Ms. Heidi Trinh [address] [place], [place] [id number] To Whom It May Concern: 
 
Heidi Trinh is currently under the care of [address] [place] [address]. She needs to be excused from work on 2/20, due to illness. If there are questions or concerns please have the patient contact our office. Sincerely, Nestor Shepherd NP

## 2018-03-06 ENCOUNTER — OFFICE VISIT (OUTPATIENT)
Dept: FAMILY MEDICINE CLINIC | Age: 28
End: 2018-03-06

## 2018-03-06 VITALS
TEMPERATURE: 97.5 F | HEIGHT: 62 IN | DIASTOLIC BLOOD PRESSURE: 75 MMHG | RESPIRATION RATE: 20 BRPM | BODY MASS INDEX: 32.76 KG/M2 | OXYGEN SATURATION: 97 % | SYSTOLIC BLOOD PRESSURE: 110 MMHG | WEIGHT: 178 LBS | HEART RATE: 94 BPM

## 2018-03-06 DIAGNOSIS — M54.50 CHRONIC BILATERAL LOW BACK PAIN WITHOUT SCIATICA: ICD-10-CM

## 2018-03-06 DIAGNOSIS — E03.8 HYPOTHYROIDISM DUE TO HASHIMOTO'S THYROIDITIS: Primary | ICD-10-CM

## 2018-03-06 DIAGNOSIS — G89.29 CHRONIC BILATERAL LOW BACK PAIN WITHOUT SCIATICA: ICD-10-CM

## 2018-03-06 DIAGNOSIS — M25.372 LEFT ANKLE INSTABILITY: ICD-10-CM

## 2018-03-06 DIAGNOSIS — E06.3 HYPOTHYROIDISM DUE TO HASHIMOTO'S THYROIDITIS: Primary | ICD-10-CM

## 2018-03-06 DIAGNOSIS — K21.9 GASTROESOPHAGEAL REFLUX DISEASE, ESOPHAGITIS PRESENCE NOT SPECIFIED: ICD-10-CM

## 2018-03-06 RX ORDER — PANTOPRAZOLE SODIUM 20 MG/1
20 TABLET, DELAYED RELEASE ORAL DAILY
Qty: 30 TAB | Refills: 5 | Status: SHIPPED | OUTPATIENT
Start: 2018-03-06 | End: 2018-06-29 | Stop reason: ALTCHOICE

## 2018-03-06 NOTE — MR AVS SNAPSHOT
[address] [address] [address] [facility] 
[phone] Patient: Marcello Payton MRN: [id number] :1990 Visit Information Date & Time Provider Department Dept. Phone Encounter #  
 3/6/2018  2:15 PM Segundo Perez [id number] Upcoming Health Maintenance Date Due  
 HPV AGE 9Y-34Y (1 of 3 - Female 3 Dose Series) 10/4/2003 PAP AKA CERVICAL CYTOLOGY 2018 Pneumococcal 19-64 Highest Risk (2 of 3 - PCV13) 3/6/2019 DTaP/Tdap/Td series (2 - Td) 10/12/2025 Allergies as of 3/6/2018  Review Complete On: 3/6/2018 By: Elijah Townsend LPN Severity Noted Reaction Type Reactions Ancef [Cefazolin] High 2015    Hives Given at c/s, swelling of face/hives, tx'd with benadryl Pcn [Penicillins] High 2011   Systemic Hives Facial edema Peanut Medium 10/13/2014   Systemic Hives Chocolate [Cocoa]  2015    Hives Citrus And Derivatives  2015    Nausea and Vomiting Oranges only [place] Low 10/13/2014   Systemic Hives Beef Containing Products Low 10/13/2014   Systemic Hives Chicken Derived Low 10/13/2014   Systemic Hives [place] Low 10/13/2014   Systemic Hives Egg Low 10/13/2014   Systemic Hives Milk Low 10/13/2014   Systemic Hives Milk Prot-turm-pepper-pineappl Low 10/13/2014   Systemic Hives Shellfish Derived Low 10/13/2014   Systemic Hives Soy Low 10/13/2014   Systemic Hives [place] Low 10/13/2014   Systemic Hives Current Immunizations  Never Reviewed Name Date Tdap 10/12/2015 Not reviewed this visit You Were Diagnosed With   
  
 Codes Comments Hypothyroidism due to Hashimoto's thyroiditis    -  Primary ICD-10-CM: E03.8, E06.3 ICD-9-CM: 244.8, 245.2 Gastroesophageal reflux disease, esophagitis presence not specified     ICD-10-CM: K21.9 ICD-9-CM: 530.81 Vitals BP Pulse Temp Resp Height(growth percentile) Weight(growth percentile) 110/75 (BP 1 Location: Right arm, BP Patient Position: Sitting) 94 97.5 °F (36.4 °C) (Oral) 20 5' 2\" (1.575 m) 178 lb (80.7 kg) LMP SpO2 BMI OB Status Smoking Status 02/20/2018 97% 32.56 kg/m2 Having regular periods Former Smoker BMI and BSA Data Body Mass Index Body Surface Area 32.56 kg/m 2 1.88 m 2 Preferred Pharmacy Pharmacy Name Phone [address] [facility] [phone] Your Updated Medication List  
  
   
This list is accurate as of 3/6/18  3:31 PM.  Always use your most recent med list.  
  
  
  
  
 albuterol 90 mcg/actuation inhaler Commonly known as:  PROVENTIL HFA, VENTOLIN HFA, PROAIR HFA Take 2 Puffs by inhalation every four (4) hours as needed for Wheezing. citalopram 40 mg tablet Commonly known as:  Francies [place] Take 1 Tab by mouth once over twenty-four (24) hours for 180 days. levothyroxine 125 mcg tablet Commonly known as:  SYNTHROID Take 1 Tab by mouth Daily (before breakfast). pantoprazole 20 mg tablet Commonly known as:  PROTONIX Take 1 Tab by mouth daily. promethazine 25 mg tablet Commonly known as:  PHENERGAN Take 1 Tab by mouth every six (6) hours as needed for Nausea. Prescriptions Sent to Pharmacy Refills  
 pantoprazole (PROTONIX) 20 mg tablet 5 Sig: Take 1 Tab by mouth daily. Class: Normal  
 Pharmacy: [address] Ph #: [phone] Route: Oral  
  
We Performed the Following THYROID CASCADE PROFILE [[id number] Custom] Patient Instructions Back Stretches: Exercises Your Care Instructions Here are some examples of exercises for stretching your back. Start each exercise slowly. Ease off the exercise if you start to have pain.  
Your doctor or physical therapist will tell you when you can start these exercises and which ones will work best for you. How to do the exercises Overhead stretch 1. Stand comfortably with your feet shoulder-width apart. 2. Looking straight ahead, raise both arms over your head and reach toward the ceiling. Do not allow your head to tilt back. 3. Hold for 15 to 30 seconds, then lower your arms to your sides. 4. Repeat 2 to 4 times. Side stretch 1. Stand comfortably with your feet shoulder-width apart. 2. Raise one arm over your head, and then lean to the other side. 3. Slide your hand down your leg as you let the weight of your arm gently stretch your side muscles. Hold for 15 to 30 seconds. 4. Repeat 2 to 4 times on each side. Press-up 1. Lie on your stomach, supporting your body with your forearms. 2. Press your elbows down into the floor to raise your upper back. As you do this, relax your stomach muscles and allow your back to arch without using your back muscles. As your press up, do not let your hips or pelvis come off the floor. 3. Hold for 15 to 30 seconds, then relax. 4. Repeat 2 to 4 times. Relax and rest 
 
1. Lie on your back with a rolled towel under your neck and a pillow under your knees. Extend your arms comfortably to your sides. 2. Relax and breathe normally. 3. Remain in this position for about 10 minutes. 4. If you can, do this 2 or 3 times each day. Follow-up care is a key part of your treatment and safety. Be sure to make and go to all appointments, and call your doctor if you are having problems. It's also a good idea to know your test results and keep a list of the medicines you take. Where can you learn more? Go to http://mamie-sandrita.info/. Enter J780 in the search box to learn more about \"Back Stretches: Exercises. \" Current as of: March 21, 2017 Content Version: 11.4 © 6229-1426 Healthwise, Incorporated.  Care instructions adapted under license by [employer] (which disclaims liability or warranty for this information). If you have questions about a medical condition or this instruction, always ask your healthcare professional. [address] any warranty or liability for your use of this information. Introducing [facility] & HEALTH SERVICES! Dear Ean: Thank you for requesting a Piczo account. Our records indicate that you already have an active Piczo account. You can access your account anytime at [email or web address]. [email or web address] Did you know that you can access your hospital and ER discharge instructions at any time in Piczo? You can also review all of your test results from your hospital stay or ER visit. Additional Information If you have questions, please visit the Frequently Asked Questions section of the Piczo website at [email or web address] Remember, Piczo is NOT to be used for urgent needs. For medical emergencies, dial 911. Now available from your iPhone and Android! Please provide this summary of care documentation to your next provider. Your primary care clinician is listed as [address]. If you have any questions after today's visit, please call [phone].

## 2018-03-06 NOTE — PATIENT INSTRUCTIONS

## 2018-03-06 NOTE — PROGRESS NOTES
Identified pt with two pt identifiers(name and ).     Chief Complaint   Patient presents with    Follow-up     Thyroid and Psych consult    Back Pain     Complains of back flank pain (Constant)        Health Maintenance Due   Topic    HPV AGE 9Y-26Y (1 of 3 - Female 3 Dose Series)    Pneumococcal 19-64 Highest Risk (1 of 3 - PCV13)    Influenza Age 5 to Adult     PAP AKA CERVICAL CYTOLOGY        Wt Readings from Last 3 Encounters:   18 181 lb (82.1 kg)   18 177 lb 6.4 oz (80.5 kg)   18 180 lb (81.6 kg)     Temp Readings from Last 3 Encounters:   18 98.2 °F (36.8 °C) (Oral)   18 98.3 °F (36.8 °C) (Oral)   18 97.7 °F (36.5 °C)     BP Readings from Last 3 Encounters:   18 106/74   18 102/70   18 99/61     Pulse Readings from Last 3 Encounters:   18 84   18 88   18 86         Learning Assessment:  :     Learning Assessment 2015 2014 3/21/2014   PRIMARY LEARNER Patient Patient Patient   HIGHEST LEVEL OF EDUCATION - PRIMARY LEARNER  GRADUATED HIGH SCHOOL OR GED GRADUATED HIGH SCHOOL OR GED GRADUATED HIGH SCHOOL OR GED   BARRIERS PRIMARY LEARNER NONE NONE NONE   CO-LEARNER CAREGIVER No No -   PRIMARY LANGUAGE ENGLISH ENGLISH ENGLISH   LEARNER PREFERENCE PRIMARY READING DEMONSTRATION DEMONSTRATION   ANSWERED BY Patient patient patient   RELATIONSHIP SELF SELF SELF       Depression Screening:  :     PHQ over the last two weeks 3/6/2018   Little interest or pleasure in doing things Not at all   Feeling down, depressed or hopeless Not at all   Total Score PHQ 2 0   Trouble falling or staying asleep, or sleeping too much -   Feeling tired or having little energy -   Poor appetite or overeating -   Feeling bad about yourself - or that you are a failure or have let yourself or your family down -   Trouble concentrating on things such as school, work, reading or watching TV -   Moving or speaking so slowly that other people could have noticed; or the opposite being so fidgety that others notice -   Thoughts of being better off dead, or hurting yourself in some way -   PHQ 9 Score -   How difficult have these problems made it for you to do your work, take care of your home and get along with others -       Fall Risk Assessment:  :     No flowsheet data found. Abuse Screening:  :     Abuse Screening Questionnaire 10/27/2016 5/22/2014   Do you ever feel afraid of your partner? N N   Are you in a relationship with someone who physically or mentally threatens you? N N   Is it safe for you to go home? Y Y       Coordination of Care Questionnaire:  :     1) Have you been to an emergency room, urgent care clinic since your last visit? no   Hospitalized since your last visit? no             2) Have you seen or consulted any other health care providers outside of [address] [place] [address] since your last visit? no  (Include any pap smears or colon screenings in this section.)    3) Do you have an Advance Directive on file? no  Are you interested in receiving information about Advance Directives? no    Reviewed record in preparation for visit and have obtained necessary documentation. Medication reconciliation up to date and corrected with patient at this time. Patient presents with complaints of lower back pain rated at a 6; states it started a few days ago when walking up stairs. Denies lifting anything heavy or falling. Also requests a follow up on thyroid tests.

## 2018-03-07 LAB — TSH SERPL DL<=0.005 MIU/L-ACNC: 2.71 UIU/ML (ref 0.45–4.5)

## 2018-03-13 ENCOUNTER — TELEPHONE (OUTPATIENT)
Dept: FAMILY MEDICINE CLINIC | Age: 28
End: 2018-03-13

## 2018-03-13 NOTE — TELEPHONE ENCOUNTER
Message forwarded from call center    Pt requesting a call back regarding the acid reflex medication she was prescribed. Pt stated that her insurance [address] [place] [address] does not cover that medication.      Pt best contact number [phone]

## 2018-03-13 NOTE — TELEPHONE ENCOUNTER
Attempted to call pt to advise she should call insurance company to determine what medication is on her formulary, however, her voicemail box was full and not accepting incoming messages.

## 2018-03-14 NOTE — TELEPHONE ENCOUNTER
[facility] reports that pantoprazole is not on pt's formulary. I have called her and left her a message to call her insurance company to obtain alternatives on her formulary and return call to advise. Pt was also advised, per Dr. Cordon Arm her TSH labs were normal and to repeat in 3-4 months.

## 2018-03-14 NOTE — TELEPHONE ENCOUNTER
[facility] reports that pantoprazole is not on pt's formulary. I have called her and left her a message to call her insurance company to obtain alternatives on her formulary and return call to advise. Pt was also advised, per Dr. Simi Herrera her TSH labs were normal and to repeat in 3-4 months.

## 2018-03-30 NOTE — PROGRESS NOTES
Subjective:      Alice Roman is an 24 y.o. female who presents for followup of hypothyroidism. Thyroid ROS: denies fatigue, weight changes, heat/cold intolerance, bowel/skin changes or CVS symptoms. Patient Active Problem List    Diagnosis Date Noted    Previous  delivery affecting pregnancy 2015    Pregnancy 2015    Abnormal chromosomal and genetic finding on  screening of mother 10/26/2015    H/O:  2015    Supervision of other normal pregnancy 2015    H/O idiopathic urticaria 2012    Hashimoto's thyroiditis 10/24/2011    Hypothyroid 2011    Multiple allergies 2011     Current Outpatient Prescriptions   Medication Sig Dispense Refill    pantoprazole (PROTONIX) 20 mg tablet Take 1 Tab by mouth daily. 30 Tab 5    levothyroxine (SYNTHROID) 125 mcg tablet Take 1 Tab by mouth Daily (before breakfast). 30 Tab 1    albuterol (PROVENTIL HFA, VENTOLIN HFA, PROAIR HFA) 90 mcg/actuation inhaler Take 2 Puffs by inhalation every four (4) hours as needed for Wheezing. 1 Inhaler 1    promethazine (PHENERGAN) 25 mg tablet Take 1 Tab by mouth every six (6) hours as needed for Nausea. 12 Tab 0    citalopram (CELEXA) 40 mg tablet Take 1 Tab by mouth once over twenty-four (24) hours for 180 days.  90 Tab 1     Allergies   Allergen Reactions    Ancef [Cefazolin] Hives     Given at c/s, swelling of face/hives, tx'd with benadryl    Pcn [Penicillins] Hives     Facial edema     Peanut Hives    Chocolate [Cocoa] Hives    Citrus And Derivatives Nausea and Vomiting     Oranges only    [place] Hives    Beef Containing Products Hives    Chicken Derived Hives    Corn Hives    Egg Hives    Milk Hives    Milk Prot-Turm-Pepper-Pineappl Hives    Shellfish Derived Hives    Soy Hives    Strawberry Hives     Past Medical History:   Diagnosis Date    Environmental allergies     GERD (gastroesophageal reflux disease)     Hashimoto's thyroiditis 10/24/2011    Hashimoto's thyroiditis 10/24/2011    Hypotension     Hypothyroid 2011    Hypothyroidism     Psychiatric disorder     depression --2 yr old daughter  2013    Unspecified adverse effect of anesthesia     per patient with epidural had severe N&V and passed out     Past Surgical History:   Procedure Laterality Date    DELIVERY   11    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, congenital anomaly    HX  SECTION  2016     Family History   Problem Relation Age of Onset    Heart Disease Mother      miltral value prolapse    Thyroid Disease Maternal Grandmother     Diabetes Paternal Grandmother     Diabetes Maternal Aunt     Cancer Paternal Grandfather      throat/stomach cancer,  at age 72    Breast Cancer Other      maternal great grandmother     Social History   Substance Use Topics    Smoking status: Former Smoker    Smokeless tobacco: Never Used    Alcohol use No        Objective:     Visit Vitals    /75 (BP 1 Location: Right arm, BP Patient Position: Sitting)    Pulse 94    Temp 97.5 °F (36.4 °C) (Oral)    Resp 20    Ht 5' 2\" (1.575 m)    Wt 178 lb (80.7 kg)    LMP 2018    SpO2 97%    BMI 32.56 kg/m2     Exam: thyroid is normal in size without nodules or tenderness. Laboratory:  Lab Results   Component Value Date/Time    TSH 2.710 2018 03:43 PM    TSH 0.496 2011 02:35 PM       Assessment/Plan:       ICD-10-CM ICD-9-CM    1. Hypothyroidism due to Hashimoto's thyroiditis E03.8 244.8 THYROID CASCADE PROFILE    E06.3 245.2    2. Gastroesophageal reflux disease, esophagitis presence not specified K21.9 530.81    3. Left ankle instability M25.372 718.87 REFERRAL TO PHYSICAL THERAPY   4. Chronic bilateral low back pain without sciatica M54.5 724.2 REFERRAL TO PHYSICAL THERAPY    G89.29 338.29      Hypothyroidism stable, asymptomatic.   current treatment plan effective, no change in therapy.    I have discussed the diagnosis with the patient and the intended treatment plan as seen in the above orders. The patient has received an after-visit summary and questions were answered concerning future plans. Asked to return should symptoms worsen or not improve with treatment. Any pending labs and studies will be relayed to patient when they become available. Pt verbalizes understanding of plan of care and denies further questions or concerns at this time. Follow-up Disposition:  Return in about 6 months (around 9/6/2018), or if symptoms worsen or fail to improve.

## 2018-04-03 RX ORDER — CITALOPRAM 40 MG/1
40 TABLET, FILM COATED ORAL
Qty: 90 TAB | Refills: 1 | Status: SHIPPED | OUTPATIENT
Start: 2018-04-03 | End: 2018-06-29 | Stop reason: SDUPTHER

## 2018-04-03 RX ORDER — LEVOTHYROXINE SODIUM 125 UG/1
125 TABLET ORAL
Qty: 30 TAB | Refills: 1 | Status: SHIPPED | OUTPATIENT
Start: 2018-04-03 | End: 2018-06-29 | Stop reason: SDUPTHER

## 2018-04-03 NOTE — TELEPHONE ENCOUNTER
From: Jose Snell  To: Jarret Alexis MD  Sent: 4/3/2018 11:35 AM EDT  Subject: Medication Renewal Request    Original authorizing provider: MD Baylee Martins.  Lilliana Lima would like a refill of the following medications:  citalopram (CELEXA) 40 mg tablet Jarret Alexis MD]  levothyroxine (SYNTHROID) 125 mcg tablet Jarret Alexis MD]    Preferred pharmacy: [facility] [address] [place] [address]    Comment:

## 2018-06-29 ENCOUNTER — OFFICE VISIT (OUTPATIENT)
Dept: FAMILY MEDICINE CLINIC | Age: 28
End: 2018-06-29

## 2018-06-29 VITALS
TEMPERATURE: 97.4 F | WEIGHT: 184 LBS | HEIGHT: 62 IN | DIASTOLIC BLOOD PRESSURE: 74 MMHG | SYSTOLIC BLOOD PRESSURE: 108 MMHG | BODY MASS INDEX: 33.86 KG/M2 | RESPIRATION RATE: 16 BRPM | OXYGEN SATURATION: 97 % | HEART RATE: 88 BPM

## 2018-06-29 DIAGNOSIS — Z78.9 HEPATITIS B VACCINATION STATUS UNKNOWN: ICD-10-CM

## 2018-06-29 DIAGNOSIS — Z00.00 PHYSICAL EXAM: Primary | ICD-10-CM

## 2018-06-29 DIAGNOSIS — Z11.1 SCREENING EXAMINATION FOR PULMONARY TUBERCULOSIS: ICD-10-CM

## 2018-06-29 RX ORDER — CITALOPRAM 40 MG/1
40 TABLET, FILM COATED ORAL
Qty: 90 TAB | Refills: 1 | Status: SHIPPED | OUTPATIENT
Start: 2018-06-29 | End: 2019-05-23 | Stop reason: SDUPTHER

## 2018-06-29 RX ORDER — LEVOTHYROXINE SODIUM 125 UG/1
125 TABLET ORAL
Qty: 90 TAB | Refills: 1 | Status: SHIPPED | OUTPATIENT
Start: 2018-06-29 | End: 2018-07-05

## 2018-06-29 NOTE — MR AVS SNAPSHOT
[address] [address] [address] [facility] 
[phone] Patient: Willie Peters MRN: [id number] :1990 Visit Information Date & Time Provider Department Dept. Phone Encounter #  
 2018 10:30 AM Segundo Patton Yuan [phone] [id number] Follow-up Instructions Return if symptoms worsen or fail to improve. Upcoming Health Maintenance Date Due  
 HPV Age 9Y-34Y (1 of 3 - Female 3 Dose Series) 10/4/2003 PAP AKA CERVICAL CYTOLOGY 2018 Influenza Age 5 to Adult 2018 Pneumococcal 19-64 Highest Risk (2 of 3 - PCV13) 3/6/2019 DTaP/Tdap/Td series (2 - Td) 10/12/2025 Allergies as of 2018  Review Complete On: 2018 By: Sharyn Pendleton MD  
  
 Severity Noted Reaction Type Reactions Ancef [Cefazolin] High 2015    Hives Given at c/s, swelling of face/hives, tx'd with benadryl Pcn [Penicillins] High 2011   Systemic Hives Facial edema Peanut Medium 10/13/2014   Systemic Hives Chocolate [Cocoa]  2015    Hives Citrus And Derivatives  2015    Nausea and Vomiting Oranges only [place] Low 10/13/2014   Systemic Hives Beef Containing Products Low 10/13/2014   Systemic Hives Chicken Derived Low 10/13/2014   Systemic Hives [place] Low 10/13/2014   Systemic Hives Egg Low 10/13/2014   Systemic Hives Milk Low 10/13/2014   Systemic Hives Milk Prot-turm-pepper-pineappl Low 10/13/2014   Systemic Hives Shellfish Derived Low 10/13/2014   Systemic Hives Soy Low 10/13/2014   Systemic Hives [place] Low 10/13/2014   Systemic Hives Current Immunizations  Reviewed on 2018 Name Date Tdap 10/12/2015 Reviewed by Sharyn Pendleton MD on 2018 at 11:20 AM  
You Were Diagnosed With   
  
 Codes Comments Physical exam    -  Primary ICD-10-CM: Z00.00 ICD-9-CM: V70.9 Hepatitis B vaccination status unknown     ICD-10-CM: Z78.9 ICD-9-CM: V49.89 Screening examination for pulmonary tuberculosis     ICD-10-CM: Z11.1 ICD-9-CM: V74.1 Vitals BP Pulse Temp Resp Height(growth percentile) Weight(growth percentile) 108/74 (BP 1 Location: Left arm, BP Patient Position: Sitting) 88 97.4 °F (36.3 °C) (Oral) 16 5' 2\" (1.575 m) 184 lb (83.5 kg) LMP SpO2 Breastfeeding? BMI OB Status Smoking Status 06/06/2018 (Approximate) 97% No 33.65 kg/m2 Having regular periods Former Smoker Vitals History BMI and BSA Data Body Mass Index Body Surface Area  
 33.65 kg/m 2 1.91 m 2 Preferred Pharmacy Pharmacy Name Phone [address] [place] [address] [phone] Your Updated Medication List  
  
   
This list is accurate as of 6/29/18 11:26 AM.  Always use your most recent med list.  
  
  
  
  
 citalopram 40 mg tablet Commonly known as:  Sable Deaner Take 1 Tab by mouth once over twenty-four (24) hours for 180 days. levothyroxine 125 mcg tablet Commonly known as:  SYNTHROID Take 1 Tab by mouth Daily (before breakfast). Prescriptions Sent to Pharmacy Refills  
 citalopram (CELEXA) 40 mg tablet 1 Sig: Take 1 Tab by mouth once over twenty-four (24) hours for 180 days. Class: Normal  
 Pharmacy: [address] Ph #: [phone] Route: Oral  
 levothyroxine (SYNTHROID) 125 mcg tablet 1 Sig: Take 1 Tab by mouth Daily (before breakfast). Class: Normal  
 Pharmacy: [address] Ph #: [phone] Route: Oral  
  
We Performed the Following HEP B SURFACE AB [id number] CPT(R)] QUANTIFERON TB GOLD [[id number] Custom] Follow-up Instructions Return if symptoms worsen or fail to improve. Introducing [facility] & HEALTH SERVICES! Dear Emre Mckeon: Thank you for requesting a [email or web address] account. Our records indicate that you already have an active [email or web address] account. You can access your account anytime at [email or web address] Did you know that you can access your hospital and ER discharge instructions at any time in [email or web address]? You can also review all of your test results from your hospital stay or ER visit. Additional Information If you have questions, please visit the Frequently Asked Questions section of the [email or web address] website at [email or web address] Remember, [email or web address] is NOT to be used for urgent needs. For medical emergencies, dial 911. Now available from your iPhone and Android! Please provide this summary of care documentation to your next provider. Your primary care clinician is listed as [address]. If you have any questions after today's visit, please call [phone].

## 2018-06-29 NOTE — PROGRESS NOTES
Identified pt with two pt identifiers(name and ).     Chief Complaint   Patient presents with    Complete Physical     does well woman check with Dr. Yodit Menon - has paperwork for school that needs to be completed        Health Maintenance Due   Topic    HPV Age 9Y-34Y (1 of 3 - Female 3 Dose Series)    PAP AKA CERVICAL CYTOLOGY        Wt Readings from Last 3 Encounters:   18 184 lb (83.5 kg)   18 178 lb (80.7 kg)   18 181 lb (82.1 kg)     Temp Readings from Last 3 Encounters:   18 97.4 °F (36.3 °C) (Oral)   18 97.5 °F (36.4 °C) (Oral)   18 98.2 °F (36.8 °C) (Oral)     BP Readings from Last 3 Encounters:   18 108/74   18 110/75   18 106/74     Pulse Readings from Last 3 Encounters:   18 88   18 94   18 84         Learning Assessment:  :     Learning Assessment 2015 2014 3/21/2014   PRIMARY LEARNER Patient Patient Patient   HIGHEST LEVEL OF EDUCATION - PRIMARY LEARNER  GRADUATED HIGH SCHOOL OR GED GRADUATED HIGH SCHOOL OR GED GRADUATED HIGH SCHOOL OR GED   BARRIERS PRIMARY LEARNER NONE NONE NONE   CO-LEARNER CAREGIVER No No -   PRIMARY LANGUAGE ENGLISH ENGLISH ENGLISH   LEARNER PREFERENCE PRIMARY READING DEMONSTRATION DEMONSTRATION   ANSWERED BY Patient patient patient   RELATIONSHIP SELF SELF SELF       Depression Screening:  :     PHQ over the last two weeks 3/6/2018   Little interest or pleasure in doing things Not at all   Feeling down, depressed or hopeless Not at all   Total Score PHQ 2 0   Trouble falling or staying asleep, or sleeping too much -   Feeling tired or having little energy -   Poor appetite or overeating -   Feeling bad about yourself - or that you are a failure or have let yourself or your family down -   Trouble concentrating on things such as school, work, reading or watching TV -   Moving or speaking so slowly that other people could have noticed; or the opposite being so fidgety that others notice - Thoughts of being better off dead, or hurting yourself in some way -   PHQ 9 Score -   How difficult have these problems made it for you to do your work, take care of your home and get along with others -       Abuse Screening:  :     Abuse Screening Questionnaire 6/29/2018 10/27/2016 5/22/2014   Do you ever feel afraid of your partner? N N N   Are you in a relationship with someone who physically or mentally threatens you? N N N   Is it safe for you to go home? Y Y Y       Coordination of Care Questionnaire:  :     1) Have you been to an emergency room, urgent care clinic since your last visit? no   Hospitalized since your last visit? no             2) Have you seen or consulted any other health care providers outside of [facility] since your last visit? no  (Include any pap smears or colon screenings in this section.)    3) Do you have an Advance Directive on file? no  Are you interested in receiving information about Advance Directives? no    Patient is accompanied by self. Reviewed record in preparation for visit and have obtained necessary documentation. Medication reconciliation up to date and corrected with patient at this time.

## 2018-06-29 NOTE — PROGRESS NOTES
Subjective:   24 y.o. female for an employment physical. 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 Her gyne and breast care is done elsewhere by her Ob-Gyne physician. She also has a h/o hypothyroid, but recently, that has been well controlled on medication. She has brought in some forms that will need to be filled out. Currently, the patient is without insurance and so will be paying out of pocket for today's evaluation. Patient Active Problem List    Diagnosis Date Noted    Previous  delivery affecting pregnancy 2015    Pregnancy 2015    Abnormal chromosomal and genetic finding on  screening of mother 10/26/2015    H/O:  2015    Supervision of other normal pregnancy 2015    H/O idiopathic urticaria 2012    Hashimoto's thyroiditis 10/24/2011    Hypothyroid 2011    Multiple allergies 2011     Current Outpatient Prescriptions   Medication Sig Dispense Refill    citalopram (CELEXA) 40 mg tablet Take 1 Tab by mouth once over twenty-four (24) hours for 180 days. 90 Tab 1    levothyroxine (SYNTHROID) 125 mcg tablet Take 1 Tab by mouth Daily (before breakfast).  90 Tab 1     Allergies   Allergen Reactions    Ancef [Cefazolin] Hives     Given at c/s, swelling of face/hives, tx'd with benadryl    Pcn [Penicillins] Hives     Facial edema     Peanut Hives    Chocolate [Cocoa] Hives    Citrus And Derivatives Nausea and Vomiting     Oranges only    Alpine Hives    Beef Containing Products Hives    Chicken Derived Hives    Corn Hives    Egg Hives    Milk Hives    Milk Prot-Turm-Pepper-Pineappl Hives    Shellfish Derived Hives    Soy Hives    Strawberry Hives     Past Medical History:   Diagnosis Date    Environmental allergies     GERD (gastroesophageal reflux disease)     Hashimoto's thyroiditis 10/24/2011    Hashimoto's thyroiditis 10/24/2011    Hypotension     Hypothyroid 2011    Hypothyroidism     Psychiatric disorder     depression --2 yr old daughter  2013    Unspecified adverse effect of anesthesia 2011    per patient with epidural had severe N&V and passed out     Past Surgical History:   Procedure Laterality Date    DELIVERY   11    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 No TIA's or unusual headaches, no dysphagia. No prolonged cough. No dyspnea or chest pain on exertion. No abdominal pain, change in bowel habits, black or bloody stools. No urinary tract symptoms. No new or unusual musculoskeletal symptoms. Specific concerns today:   As above. Objective: The patient appears well, alert, oriented x 3, in no distress. Visit Vitals    /74 (BP 1 Location: Left arm, BP Patient Position: Sitting)    Pulse 88    Temp 97.4 °F (36.3 °C) (Oral)    Resp 16    Ht 5' 2\" (1.575 m)    Wt 184 lb (83.5 kg)    LMP 2018 (Approximate)    SpO2 97%    Breastfeeding No    BMI 33.65 kg/m2     ENT normal.  Neck supple. No adenopathy or thyromegaly. KENYON. Lungs are clear, good air entry, no wheezes, rhonchi or rales. S1 and S2 normal, no murmurs, regular rate and rhythm. Abdomen soft without tenderness, guarding, mass or organomegaly. Extremities show no edema, normal peripheral pulses. Neurological is normal, no focal findings.   Breast and Pelvic exams are deferred. Assessment/Plan:       ICD-10-CM ICD-9-CM    1. Physical exam Z00.00 V70.9 Anticipatory guidance discussed. Immunizations reviewed  HM updated. 2. Hepatitis B vaccination status unknown Z78.9 V49.89 HEP B SURFACE AB   3. Screening examination for pulmonary tuberculosis Z11.1 V74.1 QUANTIFERON TB GOLD     Healthy 27F who presents for work related physical exam. Will send the labs as outlined above and complete the form once these return. I have discussed the diagnosis with the patient and the intended treatment plan as seen in the above orders. The patient has received an after-visit summary and questions were answered concerning future plans. Asked to return should symptoms worsen or not improve with treatment. Any pending labs and studies will be relayed to patient when they become available. Pt verbalizes understanding of plan of care and denies further questions or concerns at this time. Follow-up Disposition:  Return if symptoms worsen or fail to improve.

## 2018-07-03 ENCOUNTER — APPOINTMENT (OUTPATIENT)
Dept: GENERAL RADIOLOGY | Age: 28
DRG: 134 | End: 2018-07-03
Attending: EMERGENCY MEDICINE
Payer: MEDICAID

## 2018-07-03 ENCOUNTER — APPOINTMENT (OUTPATIENT)
Dept: CT IMAGING | Age: 28
DRG: 134 | End: 2018-07-03
Attending: EMERGENCY MEDICINE
Payer: MEDICAID

## 2018-07-03 ENCOUNTER — HOSPITAL ENCOUNTER (INPATIENT)
Age: 28
LOS: 2 days | Discharge: HOME | DRG: 134 | End: 2018-07-05
Attending: EMERGENCY MEDICINE | Admitting: FAMILY MEDICINE
Payer: MEDICAID

## 2018-07-03 DIAGNOSIS — I26.99 OTHER ACUTE PULMONARY EMBOLISM WITHOUT ACUTE COR PULMONALE (HCC): Primary | ICD-10-CM

## 2018-07-03 LAB
ALBUMIN SERPL-MCNC: 3.2 G/DL (ref 3.5–5)
ALBUMIN/GLOB SERPL: 0.7 {RATIO} (ref 1.1–2.2)
ALP SERPL-CCNC: 80 U/L (ref 45–117)
ALT SERPL-CCNC: 16 U/L (ref 12–78)
ANION GAP BLD CALC-SCNC: 16 MMOL/L (ref 10–20)
ANION GAP SERPL CALC-SCNC: 10 MMOL/L (ref 5–15)
AST SERPL-CCNC: 13 U/L (ref 15–37)
BASOPHILS # BLD: 0 K/UL (ref 0–0.1)
BASOPHILS NFR BLD: 0 % (ref 0–1)
BILIRUB SERPL-MCNC: 0.2 MG/DL (ref 0.2–1)
BUN BLD-MCNC: 7 MG/DL (ref 9–20)
BUN SERPL-MCNC: 9 MG/DL (ref 6–20)
BUN/CREAT SERPL: 11 (ref 12–20)
CA-I BLD-MCNC: 1.19 MMOL/L (ref 1.12–1.32)
CALCIUM SERPL-MCNC: 9.1 MG/DL (ref 8.5–10.1)
CHLORIDE BLD-SCNC: 103 MMOL/L (ref 98–107)
CHLORIDE SERPL-SCNC: 102 MMOL/L (ref 97–108)
CO2 BLD-SCNC: 26 MMOL/L (ref 21–32)
CO2 SERPL-SCNC: 25 MMOL/L (ref 21–32)
CREAT BLD-MCNC: 0.8 MG/DL (ref 0.6–1.3)
CREAT SERPL-MCNC: 0.81 MG/DL (ref 0.55–1.02)
D DIMER PPP FEU-MCNC: 2.51 MG/L FEU (ref 0–0.65)
DIFFERENTIAL METHOD BLD: ABNORMAL
EOSINOPHIL # BLD: 0.1 K/UL (ref 0–0.4)
EOSINOPHIL NFR BLD: 1 % (ref 0–7)
ERYTHROCYTE [DISTWIDTH] IN BLOOD BY AUTOMATED COUNT: 13.3 % (ref 11.5–14.5)
GLOBULIN SER CALC-MCNC: 4.6 G/DL (ref 2–4)
GLUCOSE BLD-MCNC: 100 MG/DL (ref 65–100)
GLUCOSE SERPL-MCNC: 95 MG/DL (ref 65–100)
HCG UR QL: NEGATIVE
HCT VFR BLD AUTO: 32.2 % (ref 35–47)
HCT VFR BLD CALC: 33 % (ref 35–47)
HGB BLD-MCNC: 10.5 G/DL (ref 11.5–16)
LYMPHOCYTES # BLD: 2.1 K/UL (ref 0.8–3.5)
LYMPHOCYTES NFR BLD: 22 % (ref 12–49)
MCH RBC QN AUTO: 27.7 PG (ref 26–34)
MCHC RBC AUTO-ENTMCNC: 32.6 G/DL (ref 30–36.5)
MCV RBC AUTO: 85 FL (ref 80–99)
MONOCYTES # BLD: 0.9 K/UL (ref 0–1)
MONOCYTES NFR BLD: 9 % (ref 5–13)
NEUTS SEG # BLD: 6.5 K/UL (ref 1.8–8)
NEUTS SEG NFR BLD: 68 % (ref 32–75)
PLATELET # BLD AUTO: 330 K/UL (ref 150–400)
PMV BLD AUTO: 9.8 FL (ref 8.9–12.9)
POTASSIUM BLD-SCNC: 3.6 MMOL/L (ref 3.5–5.1)
POTASSIUM SERPL-SCNC: 4 MMOL/L (ref 3.5–5.1)
PROT SERPL-MCNC: 7.8 G/DL (ref 6.4–8.2)
RBC # BLD AUTO: 3.79 M/UL (ref 3.8–5.2)
SERVICE CMNT-IMP: ABNORMAL
SODIUM BLD-SCNC: 139 MMOL/L (ref 136–145)
SODIUM SERPL-SCNC: 137 MMOL/L (ref 136–145)
TROPONIN I SERPL-MCNC: <0.05 NG/ML
WBC # BLD AUTO: 9.6 K/UL (ref 3.6–11)
XXWBCSUS: 0

## 2018-07-03 PROCEDURE — 74011636320 HC RX REV CODE- 636/320: Performed by: EMERGENCY MEDICINE

## 2018-07-03 PROCEDURE — 65660000000 HC RM CCU STEPDOWN

## 2018-07-03 PROCEDURE — 93005 ELECTROCARDIOGRAM TRACING: CPT

## 2018-07-03 PROCEDURE — 80047 BASIC METABLC PNL IONIZED CA: CPT

## 2018-07-03 PROCEDURE — 3E013GC INTRODUCTION OF OTHER THERAPEUTIC SUBSTANCE INTO SUBCUTANEOUS TISSUE, PERCUTANEOUS APPROACH: ICD-10-PCS | Performed by: FAMILY MEDICINE

## 2018-07-03 PROCEDURE — 81025 URINE PREGNANCY TEST: CPT

## 2018-07-03 PROCEDURE — 96374 THER/PROPH/DIAG INJ IV PUSH: CPT

## 2018-07-03 PROCEDURE — 85025 COMPLETE CBC W/AUTO DIFF WBC: CPT | Performed by: EMERGENCY MEDICINE

## 2018-07-03 PROCEDURE — 36415 COLL VENOUS BLD VENIPUNCTURE: CPT | Performed by: EMERGENCY MEDICINE

## 2018-07-03 PROCEDURE — 74011250636 HC RX REV CODE- 250/636: Performed by: EMERGENCY MEDICINE

## 2018-07-03 PROCEDURE — 84484 ASSAY OF TROPONIN QUANT: CPT | Performed by: EMERGENCY MEDICINE

## 2018-07-03 PROCEDURE — 80053 COMPREHEN METABOLIC PANEL: CPT | Performed by: EMERGENCY MEDICINE

## 2018-07-03 PROCEDURE — 96372 THER/PROPH/DIAG INJ SC/IM: CPT

## 2018-07-03 PROCEDURE — 74011250637 HC RX REV CODE- 250/637: Performed by: STUDENT IN AN ORGANIZED HEALTH CARE EDUCATION/TRAINING PROGRAM

## 2018-07-03 PROCEDURE — 85379 FIBRIN DEGRADATION QUANT: CPT | Performed by: EMERGENCY MEDICINE

## 2018-07-03 PROCEDURE — 71275 CT ANGIOGRAPHY CHEST: CPT

## 2018-07-03 PROCEDURE — 71046 X-RAY EXAM CHEST 2 VIEWS: CPT

## 2018-07-03 PROCEDURE — 99285 EMERGENCY DEPT VISIT HI MDM: CPT

## 2018-07-03 RX ORDER — ENOXAPARIN SODIUM 100 MG/ML
1 INJECTION SUBCUTANEOUS
Status: COMPLETED | OUTPATIENT
Start: 2018-07-03 | End: 2018-07-03

## 2018-07-03 RX ORDER — SODIUM CHLORIDE 0.9 % (FLUSH) 0.9 %
5-10 SYRINGE (ML) INJECTION AS NEEDED
Status: DISCONTINUED | OUTPATIENT
Start: 2018-07-03 | End: 2018-07-05 | Stop reason: HOSPADM

## 2018-07-03 RX ORDER — NORGESTIMATE AND ETHINYL ESTRADIOL 0.25-0.035
1 KIT ORAL DAILY
COMMUNITY
End: 2018-07-05

## 2018-07-03 RX ORDER — ENOXAPARIN SODIUM 100 MG/ML
1 INJECTION SUBCUTANEOUS EVERY 12 HOURS
Status: DISCONTINUED | OUTPATIENT
Start: 2018-07-04 | End: 2018-07-05 | Stop reason: HOSPADM

## 2018-07-03 RX ORDER — KETOROLAC TROMETHAMINE 30 MG/ML
30 INJECTION, SOLUTION INTRAMUSCULAR; INTRAVENOUS
Status: COMPLETED | OUTPATIENT
Start: 2018-07-03 | End: 2018-07-03

## 2018-07-03 RX ORDER — ACETAMINOPHEN 325 MG/1
650 TABLET ORAL
Status: DISCONTINUED | OUTPATIENT
Start: 2018-07-03 | End: 2018-07-05 | Stop reason: HOSPADM

## 2018-07-03 RX ORDER — SODIUM CHLORIDE 0.9 % (FLUSH) 0.9 %
5-10 SYRINGE (ML) INJECTION EVERY 8 HOURS
Status: DISCONTINUED | OUTPATIENT
Start: 2018-07-03 | End: 2018-07-05 | Stop reason: HOSPADM

## 2018-07-03 RX ORDER — ENOXAPARIN SODIUM 100 MG/ML
1 INJECTION SUBCUTANEOUS EVERY 12 HOURS
Status: DISCONTINUED | OUTPATIENT
Start: 2018-07-03 | End: 2018-07-03

## 2018-07-03 RX ADMIN — ACETAMINOPHEN 650 MG: 325 TABLET ORAL at 23:58

## 2018-07-03 RX ADMIN — IOPAMIDOL 100 ML: 755 INJECTION, SOLUTION INTRAVENOUS at 19:13

## 2018-07-03 RX ADMIN — ENOXAPARIN SODIUM 80 MG: 80 INJECTION SUBCUTANEOUS at 20:23

## 2018-07-03 RX ADMIN — Medication 10 ML: at 23:58

## 2018-07-03 RX ADMIN — KETOROLAC TROMETHAMINE 30 MG: 30 INJECTION, SOLUTION INTRAMUSCULAR; INTRAVENOUS at 19:21

## 2018-07-03 NOTE — ED TRIAGE NOTES
Pt ambulated to the treatment area with a steady gait. Pt states \"I have had intermittent left rib pain that radiates to my back chest and shoulder since last week on Wednesday it stopped a few days then it came back. I was at the doctor Friday I had a physical with blood work I did not mention it because it was not hurting that day. I feel like I am burping a lot\" Pt appears in no distress.

## 2018-07-03 NOTE — ED NOTES
Bedside and Verbal shift change report given to Tim Bosch RN (oncoming nurse) by Myla Drake RN (offgoing nurse). Report included the following information SBAR, ED Summary, MAR, Recent Results and Cardiac Rhythm NSR.

## 2018-07-03 NOTE — ED NOTES
EKG obtained and IV access established. Blood samples sent to lab for ordered testing. Patient tolerated all procedures well. Voices no further requests or concerns. Call bell in reach. Will continue to monitor.

## 2018-07-03 NOTE — ED NOTES
Dr. Jodene Siemens at bedside to evaluate patient. Patient updated regarding plan of care and associated time constraints. Patient verbalizes understanding and agreement. Call bell in reach. Will continue to monitor.

## 2018-07-03 NOTE — IP AVS SNAPSHOT
[address] [facility] [address] 
[phone] Patient: John Ramirez MRN: [id number] :1990 About your hospitalization You were admitted on:  July 3, 2018 You last received care in the:  [facility] 3 PROG CARE TELE 2 You were discharged on:  2018 Why you were hospitalized Your primary diagnosis was:  Not on File Your diagnoses also included:  Pulmonary Embolism (Hcc) Follow-up Information Follow up With Details Comments Contact Info Abdoul Roy MD Go on 2018 Hospital f/u at 11.15- arrive at [address] [facility] [address] 
[phone] Delmar Mccall MD Go on 2018 Appoitment at 8 am  [address] [facility] [address] 
[phone] Dayana Kaba MD   [place] [address] [address] 
[phone] Your Scheduled Appointments 2018 11:15 AM EDT Any with Abdoul Roy MD  
[address] [place] [address] Road [address] [place] [address]) [facility] [address]  
[phone] 2018  8:00 AM EDT Office Visit with Delmar Mccall MD  
[facility] at [address] [place] [address]) [address]  
[phone] Discharge Orders None A check gen indicates which time of day the medication should be taken. My Medications START taking these medications Instructions Each Dose to Equal  
 Morning Noon Evening Bedtime  
 enoxaparin 80 mg/0.8 mL injection Commonly known as:  LOVENOX Your last dose was:  8:40 am  Notes to Patient:  Blood thinner to be used until Warfarin in therapeutic. 80 mg by SubCUTAneous route every twelve (12) hours for 14 days. 80 mg Take every 12 hours as directed  
   
  
 warfarin 2.5 mg tablet Commonly known as:  COUMADIN Your last dose was:  12:08 pm  Your next dose is:  6 pm  Notes to Patient:  Blood thinner - take as directed by physician Take 2 Tabs by mouth daily. Follow up with PCP for regular labs, they will adjust your warfarin as needed. 5 mg Take around 6 pm  
   
  
  
CHANGE how you take these medications Instructions Each Dose to Equal  
 Morning Noon Evening Bedtime  
 levothyroxine 137 mcg tablet Commonly known as:  SYNTHROID What changed:   
- medication strength 
- how much to take Your last dose was:  8:45 am  Take 137.5 mcg by mouth Daily (before breakfast). 137.5 mcg CONTINUE taking these medications Instructions Each Dose to Equal  
 Morning Noon Evening Bedtime  
 citalopram 40 mg tablet Commonly known as:  Chase Bio Your last dose was:  8:45 am  Take 1 Tab by mouth once over twenty-four (24) hours for 180 days. 40 mg  
    
  
   
   
   
  
  
STOP taking these medications [address] [facility] (22) 0.25-35 mg-mcg Tab Generic drug:  norgestimate-ethinyl estradiol Where to Get Your Medications Information on where to get these meds will be given to you by the nurse or doctor. ! Ask your nurse or doctor about these medications  
  enoxaparin 80 mg/0.8 mL injection  
 levothyroxine 137 mcg tablet  
 warfarin 2.5 mg tablet Discharge Instructions HOME DISCHARGE INSTRUCTIONS Anita Elizabeth / [id number] : 1990 Admission date: 7/3/2018 Discharge date: 2018 Please bring this form with you to show your care provider at your follow-up appointment. Primary care provider:  Prachi Sewell MD 
 
Discharging provider:  Geena Echavarria MD - Family Medicine Resident Dr. Claudia Montalvo - Attending, Family Medicine You have been admitted to the hospital with the following diagnoses: 
 
ACUTE DIAGNOSES: 
 Pulmonary embolism ([facility] [address] Elie Trejo . . . . . . . . . . . . . . . . . . . . . . . . . . . . . . . . . . . . . . . . . . . . . . . . . . . . . . . . . . . . . . . . . . . . . . Elie Trejo FOLLOW-UP CARE RECOMMENDATIONS: 
 
Appointments Follow-up Information Follow up With Details Comments Contact Info Damon Levy MD Go on 2018 Hospital f/u at 11.15- arrive at [address] [facility] [address] [facility] 
[phone] Cinthya Bravo MD Go on 2018 Appoitment at 8 am  [address] [place] [address]. [address] [place] [address] 
[phone] Please follow up with your PCP regardin. Blood clot and medication dosing, regular INR checks 2. Recheck your TSH in a few weeks. It was elevated in the hospital but it can be elevated acutely due to the blood clot. 3. Follow up with PCP regularly, they will need to check your INR (lab) to check your clotting and will adjust your medications as needed. MEDICATION CHANGES: 
1. Take the lovenox injections twice a day. 2. Take the warfarin 5mg daily. 3. Levothyroxine 137mcg daily (increased from 125mcg) Follow-up tests needed: INR Pending test results: At the time of your discharge the following test results are still pending: Hypercoagulable workup - Protein C/S activity, Factor V leiden, coagulation screen, antithrombin III panel, apolipoprotein A-1 (lupus anticoagulant, cardiolipin antibody, beta2 glucoprotein antibody) Elie Trejo Please make sure you review these results with your outpatient follow-up provider(s). Specific symptoms to watch for: chest pain, shortness of breath, fever, chills, nausea, vomiting, diarrhea, change in mentation, falling, weakness, bleeding. DIET/what to eat:  Regular Diet ACTIVITY:  Activity as tolerated Wound care: None Equipment needed:  None What to do if new or unexpected symptoms occur?    
If you experience any of the above symptoms (or should other concerns or questions arise after discharge) please call your primary care physician. Return to the emergency room if you cannot get hold of your doctor. · It is very important that you keep your follow-up appointment(s). · Please bring discharge papers, medication list (and/or medication bottles) to your follow-up appointments for review by your outpatient provider(s). · Please check the list of medications and be sure it includes every medication (even non-prescription medications) that your provider wants you to take. · It is important that you take the medication exactly as they are prescribed. · Keep your medication in the bottles provided by the pharmacist and keep a list of the medication names, dosages, and times to be taken in your wallet. · Do not take other medications without consulting your doctor. · If you have any questions about your medications or other instructions, please talk to your nurse or care provider before you leave the hospital.  
 
Information obtained by:  
 
I understand that if any problems occur once I am at home I am to contact my physician. These instructions were explained to me and I had the opportunity to ask questions. I understand and acknowledge receipt of the instructions indicated above. Physician's or R.N.'s Signature                                                                  Date/Time Patient or Representative Signature                                                          Date/Time [facility] Announcement  We are excited to announce that we are making your provider's discharge notes available to you in Firmex. You will see these notes when they are completed and signed by the physician that discharged you from your recent hospital stay. If you have any questions or concerns about any information you see in Firmex, please call the Health Information Department where you were seen or reach out to your Primary Care Provider for more information about your plan of care. Introducing \[facility]\"" & HEALTH SERVICES! Dear Reilly Gill: Thank you for requesting a Firmex account. Our records indicate that you already have an active Firmex account. You can access your account anytime at [email or web address] Did you know that you can access your hospital and ER discharge instructions at any time in Firmex? You can also review all of your test results from your hospital stay or ER visit. Additional Information If you have questions, please visit the Frequently Asked Questions section of the Firmex website at [email or web address] Remember, Firmex is NOT to be used for urgent needs. For medical emergencies, dial 911. Now available from your iPhone and Android! Introducing Alberto Lopez As a Saralee Judah patient, I wanted to make you aware of our electronic visit tool called Alberto Reguloeduardotobias. Nancy oS 24/7 allows you to connect within minutes with a medical provider 24 hours a day, seven days a week via a mobile device or tablet or logging into a secure website from your computer. You can access Alberto Lopez from anywhere in the [place]. A virtual visit might be right for you when you have a simple condition and feel like you just dont want to get out of bed, or cant get away from work for an appointment, when your regular Saralee Judah provider is not available (evenings, weekends or holidays), or when youre out of town and need minor care.   Electronic visits cost only $49 and if the [facility] 24/7 provider determines a prescription is needed to treat your condition, one can be electronically transmitted to a nearby pharmacy*. Please take a moment to enroll today if you have not already done so. The enrollment process is free and takes just a few minutes. To enroll, please download the Peter Single 24/7 nat to your tablet or phone, or visit [email or web address] to enroll on your computer. And, as an [address] [place] [address] patient with a [employer] account, the results of your visits will be scanned into your electronic medical record and your primary care provider will be able to view the scanned results. We urge you to continue to see your regular Tamir Urrutia provider for your ongoing medical care. And while your primary care provider may not be the one available when you seek a BitMethod virtual visit, the peace of mind you get from getting a real diagnosis real time can be priceless. For more information on BitMethod, view our Frequently Asked Questions (FAQs) at [email or web address]. Sincerely, 
 
Jayme Lepe MD 
Chief Medical Officer Parker Petersen *:  certain medications cannot be prescribed via BitMethod Unresulted Labs-Please follow up with your PCP about these lab tests Order Current Status ANTITHROMBIN III PANEL In process APOLIPOPROTEIN A-1 In process FACTOR II  DNA ANALYSIS In process FACTOR V LEIDEN In process PROTEIN C ACTIVITY In process PROTEIN S, FUNCTIONAL In process EKG, 12 LEAD, INITIAL Preliminary result Providers Seen During Your Hospitalization Provider Specialty Primary office phone Ck Perales. Perla Mabry, [address] [facility] [phone] Shorty Mario MD Emergency Medicine [phone] Mouna Sanchez MD [facility] [phone] Roman Le MD Family Practice [phone] Your Primary Care Physician (PCP) Primary Care Physician Office Phone Office Fax Radha Chaidez [phone] You are allergic to the following Allergen Reactions Ancef (Cefazolin) Hives Given at c/s, swelling of face/hives, tx'd with benadryl Pcn (Penicillins) Hives Facial edema Peanut Hives Chocolate (Cocoa) Hives Citrus And Derivatives Nausea and Vomiting Oranges only Wendie Grebe Beef Containing Products Hives Chicken Derived Hives Corn Hives Egg Hives Milk Hives Milk Prot-Turm-Pepper-Pineappl Hives Shellfish Derived Hives Soy Hives Strawberry Hives Recent Documentation Height Weight BMI OB Status Smoking Status 1.6 m 84.4 kg 32.95 kg/m2 Having regular periods Former Smoker Emergency Contacts Name Discharge Info Relation Home Work Mobile Shelley Larson DISCHARGE CAREGIVER [3] Mother [14]   [phone] Sreedhar Larson DISCHARGE CAREGIVER [3] Father [15]   [phone] Patient Belongings The following personal items are in your possession at time of discharge: 
  Dental Appliances: None  Visual Aid: Glasses, With patient      Home Medications: Kept at bedside   Jewelry: Earrings  Clothing: At bedside    Other Valuables: Eyeglasses, Cell Phone, Vivartes Discharge Instructions Attachments/References MEFS - WARFARIN (COUMADIN, JANTOVEN) - (BY MOUTH) (ENGLISH) MEFS - ENOXAPARIN (LOVENOX) - (BY INJECTION) (ENGLISH) Patient Handouts Warfarin (Coumadin, Jantoven) - (By mouth) Why this medicine is used:  
Prevents and treats blood clots. Contact a nurse or doctor right away if you have: 
· Sudden or severe headache · Red or dark brown urine, or red or black, tarry stools · Bloody vomit or vomit that looks like coffee grounds · Bleeding that does not stop or bruises that do not heal 
 · Purplish red, net-like, blotchy spots on the skin Common side effects: · Minor bleeding or bruising © 2017 [address]  Information is for End User's use only and may not be sold, redistributed or otherwise used for commercial purposes. Enoxaparin (Lovenox) - (By injection) Why this medicine is used:  
Prevents and treats blood clots. Contact a nurse or doctor right away if you have: 
· Sudden or severe headache · Back pain, numbness, tingling, or weakness in your legs or feet · Red or black, tarry stools; loss of bladder or bowel control · Bloody vomit or vomit that looks like coffee grounds · Bleeding that does not stop or bruises that do not heal  
 
Common side effects: · Minor bleeding or bruising · Pain, redness, swelling, or a lump where the shot was given © 2017 [address] Information is for End User's use only and may not be sold, redistributed or otherwise used for commercial purposes. Please provide this summary of care documentation to your next provider. Signatures-by signing, you are acknowledging that this After Visit Summary has been reviewed with you and you have received a copy. Patient Signature:  ____________________________________________________________ Date:  ____________________________________________________________  
  
[facility] Signature:  ____________________________________________________________ Date:  ____________________________________________________________

## 2018-07-03 NOTE — IP AVS SNAPSHOT
[address] [facility] 104 [address] [facility] 
[phone] Patient: Gautam Waddell MRN: [id number] :1990 A check gen indicates which time of day the medication should be taken. My Medications START taking these medications Instructions Each Dose to Equal  
 Morning Noon Evening Bedtime  
 enoxaparin 80 mg/0.8 mL injection Commonly known as:  LOVENOX Your last dose was:  8:40 am  Notes to Patient:  Blood thinner to be used until Warfarin in therapeutic. 80 mg by SubCUTAneous route every twelve (12) hours for 14 days. 80 mg Take every 12 hours as directed  
   
  
 warfarin 2.5 mg tablet Commonly known as:  COUMADIN Your last dose was:  12:08 pm  Your next dose is:  6 pm  Notes to Patient:  Blood thinner - take as directed by physician Take 2 Tabs by mouth daily. Follow up with PCP for regular labs, they will adjust your warfarin as needed. 5 mg Take around 6 pm  
   
  
  
CHANGE how you take these medications Instructions Each Dose to Equal  
 Morning Noon Evening Bedtime  
 levothyroxine 137 mcg tablet Commonly known as:  SYNTHROID What changed:   
- medication strength 
- how much to take Your last dose was:  8:45 am  Take 137.5 mcg by mouth Daily (before breakfast). 137.5 mcg CONTINUE taking these medications Instructions Each Dose to Equal  
 Morning Noon Evening Bedtime  
 citalopram 40 mg tablet Commonly known as:  [place] Your last dose was:  8:45 am  Take 1 Tab by mouth once over twenty-four (24) hours for 180 days. 40 mg  
    
  
   
   
   
  
  
STOP taking these medications [facility] (28) 0.25-35 mg-mcg Tab Generic drug:  norgestimate-ethinyl estradiol Where to Get Your Medications Information on where to get these meds will be given to you by the nurse or doctor. ! Ask your nurse or doctor about these medications  
  enoxaparin 80 mg/0.8 mL injection  
 levothyroxine 137 mcg tablet  
 warfarin 2.5 mg tablet

## 2018-07-03 NOTE — ED NOTES
Care assumed from Dr. Wilder Olivera. Pending CTA chest for left chest pain, elevated dimer, unremarkable EKG. 8:18 PM  Patient with multiple PE, will administer Lovenox, discussed with Family Medicine Resident for admission.

## 2018-07-03 NOTE — ED NOTES
Patient ambulatory to restroom to obtain ordered urine sample. Gait steady. Patient updated regarding plan of care and additional testing. Patient verbalizes understanding. Patient in no distress at this time. States pain is \"ok\" right now. Texting and using cell phone at this time with call bell in reach.

## 2018-07-04 LAB
ALBUMIN SERPL-MCNC: 3.4 G/DL (ref 3.5–5)
ALBUMIN/GLOB SERPL: 0.9 {RATIO} (ref 1.1–2.2)
ALP SERPL-CCNC: 87 U/L (ref 45–117)
ALT SERPL-CCNC: 17 U/L (ref 12–78)
ANION GAP SERPL CALC-SCNC: 10 MMOL/L (ref 5–15)
ANNOTATION COMMENT IMP: NORMAL
APTT PPP: 30.8 SEC (ref 22.1–32)
AST SERPL-CCNC: 13 U/L (ref 15–37)
BILIRUB SERPL-MCNC: 0.2 MG/DL (ref 0.2–1)
BUN SERPL-MCNC: 7 MG/DL (ref 6–20)
BUN/CREAT SERPL: 9 (ref 12–20)
CALCIUM SERPL-MCNC: 9.1 MG/DL (ref 8.5–10.1)
CHLORIDE SERPL-SCNC: 104 MMOL/L (ref 97–108)
CO2 SERPL-SCNC: 27 MMOL/L (ref 21–32)
CREAT SERPL-MCNC: 0.79 MG/DL (ref 0.55–1.02)
ERYTHROCYTE [DISTWIDTH] IN BLOOD BY AUTOMATED COUNT: 13.1 % (ref 11.5–14.5)
ERYTHROCYTE [SEDIMENTATION RATE] IN BLOOD: 36 MM/HR (ref 0–20)
FIBRINOGEN PPP-MCNC: 386 MG/DL (ref 200–475)
GAMMA INTERFERON BACKGROUND BLD IA-ACNC: 0.06 IU/ML
GLOBULIN SER CALC-MCNC: 4 G/DL (ref 2–4)
GLUCOSE SERPL-MCNC: 96 MG/DL (ref 65–100)
HBV SURFACE AB SER QL: REACTIVE
HCT VFR BLD AUTO: 35.4 % (ref 35–47)
HGB BLD-MCNC: 11 G/DL (ref 11.5–16)
INR PPP: 1 (ref 0.9–1.1)
M TB IFN-G BLD-IMP: NEGATIVE
M TB IFN-G CD4+ BCKGRND COR BLD-ACNC: 0.01 IU/ML
M TB IFN-G CD4+ T-CELLS BLD-ACNC: 0.07 IU/ML
MCH RBC QN AUTO: 26.4 PG (ref 26–34)
MCHC RBC AUTO-ENTMCNC: 31.1 G/DL (ref 30–36.5)
MCV RBC AUTO: 84.9 FL (ref 80–99)
MITOGEN IGNF BLD-ACNC: >10 IU/ML
NRBC # BLD: 0 K/UL (ref 0–0.01)
NRBC BLD-RTO: 0 PER 100 WBC
PLATELET # BLD AUTO: 346 K/UL (ref 150–400)
PMV BLD AUTO: 9.8 FL (ref 8.9–12.9)
POTASSIUM SERPL-SCNC: 3.6 MMOL/L (ref 3.5–5.1)
PROT SERPL-MCNC: 7.4 G/DL (ref 6.4–8.2)
PROTHROMBIN TIME: 9.8 SEC (ref 9–11.1)
QUANTIFERON INCUBATION: NORMAL
RBC # BLD AUTO: 4.17 M/UL (ref 3.8–5.2)
SERVICE CMNT-IMP: NORMAL
SODIUM SERPL-SCNC: 141 MMOL/L (ref 136–145)
THERAPEUTIC RANGE,PTTT: NORMAL SECS (ref 58–77)
TSH SERPL DL<=0.05 MIU/L-ACNC: 26.3 UIU/ML (ref 0.36–3.74)
WBC # BLD AUTO: 9.8 K/UL (ref 3.6–11)

## 2018-07-04 PROCEDURE — 65660000000 HC RM CCU STEPDOWN

## 2018-07-04 PROCEDURE — 77030027138 HC INCENT SPIROMETER -A

## 2018-07-04 PROCEDURE — 85027 COMPLETE CBC AUTOMATED: CPT | Performed by: STUDENT IN AN ORGANIZED HEALTH CARE EDUCATION/TRAINING PROGRAM

## 2018-07-04 PROCEDURE — 74011250637 HC RX REV CODE- 250/637: Performed by: FAMILY MEDICINE

## 2018-07-04 PROCEDURE — 81241 F5 GENE: CPT | Performed by: STUDENT IN AN ORGANIZED HEALTH CARE EDUCATION/TRAINING PROGRAM

## 2018-07-04 PROCEDURE — 74011250636 HC RX REV CODE- 250/636: Performed by: STUDENT IN AN ORGANIZED HEALTH CARE EDUCATION/TRAINING PROGRAM

## 2018-07-04 PROCEDURE — 85306 CLOT INHIBIT PROT S FREE: CPT | Performed by: STUDENT IN AN ORGANIZED HEALTH CARE EDUCATION/TRAINING PROGRAM

## 2018-07-04 PROCEDURE — 82172 ASSAY OF APOLIPOPROTEIN: CPT | Performed by: STUDENT IN AN ORGANIZED HEALTH CARE EDUCATION/TRAINING PROGRAM

## 2018-07-04 PROCEDURE — 74011250637 HC RX REV CODE- 250/637: Performed by: STUDENT IN AN ORGANIZED HEALTH CARE EDUCATION/TRAINING PROGRAM

## 2018-07-04 PROCEDURE — 81240 F2 GENE: CPT | Performed by: STUDENT IN AN ORGANIZED HEALTH CARE EDUCATION/TRAINING PROGRAM

## 2018-07-04 PROCEDURE — 80053 COMPREHEN METABOLIC PANEL: CPT | Performed by: STUDENT IN AN ORGANIZED HEALTH CARE EDUCATION/TRAINING PROGRAM

## 2018-07-04 PROCEDURE — 84443 ASSAY THYROID STIM HORMONE: CPT | Performed by: STUDENT IN AN ORGANIZED HEALTH CARE EDUCATION/TRAINING PROGRAM

## 2018-07-04 PROCEDURE — 93306 TTE W/DOPPLER COMPLETE: CPT

## 2018-07-04 PROCEDURE — 36415 COLL VENOUS BLD VENIPUNCTURE: CPT | Performed by: STUDENT IN AN ORGANIZED HEALTH CARE EDUCATION/TRAINING PROGRAM

## 2018-07-04 PROCEDURE — 85652 RBC SED RATE AUTOMATED: CPT

## 2018-07-04 PROCEDURE — 85300 ANTITHROMBIN III ACTIVITY: CPT | Performed by: STUDENT IN AN ORGANIZED HEALTH CARE EDUCATION/TRAINING PROGRAM

## 2018-07-04 PROCEDURE — 85303 CLOT INHIBIT PROT C ACTIVITY: CPT | Performed by: STUDENT IN AN ORGANIZED HEALTH CARE EDUCATION/TRAINING PROGRAM

## 2018-07-04 PROCEDURE — 85610 PROTHROMBIN TIME: CPT | Performed by: STUDENT IN AN ORGANIZED HEALTH CARE EDUCATION/TRAINING PROGRAM

## 2018-07-04 RX ORDER — WARFARIN 2.5 MG/1
5 TABLET ORAL DAILY
Qty: 28 TAB | Refills: 0 | Status: SHIPPED | OUTPATIENT
Start: 2018-07-04 | End: 2018-07-04

## 2018-07-04 RX ORDER — LEVOTHYROXINE SODIUM 137 UG/1
137.5 TABLET ORAL
Qty: 30 TAB | Refills: 0 | Status: SHIPPED
Start: 2018-07-05 | End: 2018-07-05

## 2018-07-04 RX ORDER — LEVOTHYROXINE SODIUM 125 UG/1
125 TABLET ORAL
Status: DISCONTINUED | OUTPATIENT
Start: 2018-07-04 | End: 2018-07-04

## 2018-07-04 RX ORDER — ENOXAPARIN SODIUM 100 MG/ML
80 INJECTION SUBCUTANEOUS EVERY 12 HOURS
Qty: 22.4 ML | Refills: 0 | Status: SHIPPED | OUTPATIENT
Start: 2018-07-04 | End: 2018-07-13 | Stop reason: ALTCHOICE

## 2018-07-04 RX ORDER — CITALOPRAM 20 MG/1
40 TABLET, FILM COATED ORAL DAILY
Status: DISCONTINUED | OUTPATIENT
Start: 2018-07-04 | End: 2018-07-05 | Stop reason: HOSPADM

## 2018-07-04 RX ORDER — WARFARIN SODIUM 5 MG/1
5 TABLET ORAL ONCE
Status: COMPLETED | OUTPATIENT
Start: 2018-07-04 | End: 2018-07-04

## 2018-07-04 RX ORDER — WARFARIN 2.5 MG/1
5 TABLET ORAL DAILY
Qty: 28 TAB | Refills: 0 | Status: SHIPPED | OUTPATIENT
Start: 2018-07-04 | End: 2018-07-13

## 2018-07-04 RX ADMIN — Medication 10 ML: at 07:43

## 2018-07-04 RX ADMIN — ENOXAPARIN SODIUM 80 MG: 80 INJECTION SUBCUTANEOUS at 08:11

## 2018-07-04 RX ADMIN — ENOXAPARIN SODIUM 80 MG: 80 INJECTION SUBCUTANEOUS at 20:06

## 2018-07-04 RX ADMIN — LEVOTHYROXINE SODIUM 125 MCG: 125 TABLET ORAL at 07:43

## 2018-07-04 RX ADMIN — Medication 10 ML: at 20:06

## 2018-07-04 RX ADMIN — CITALOPRAM HYDROBROMIDE 40 MG: 20 TABLET ORAL at 08:11

## 2018-07-04 RX ADMIN — WARFARIN SODIUM 5 MG: 5 TABLET ORAL at 12:08

## 2018-07-04 RX ADMIN — ACETAMINOPHEN 650 MG: 325 TABLET ORAL at 04:21

## 2018-07-04 RX ADMIN — ACETAMINOPHEN 650 MG: 325 TABLET ORAL at 21:30

## 2018-07-04 NOTE — PROGRESS NOTES
[facility]    Resident Admission Note    CC: Left sided rib pain    HPI:  Isaiah Johnson is a 24 y.o. female with history of Hypothyrodism who presents to the ER complaining of left sided rib pain. Pain was intermittent for the past week, which got worse earlier today and  prompted presentation to [facility] ED. Patient had her annual physical exam with PCP on Friday but did not have the left side rib pain at that time. Patient reports she was started on birth control pills approximately a month ago. Patient denies history of blood clots, recent surgery, recent immobilization/ long distance trip. Patient reported her Aunt with hx of blood clot s/p knee surgery. In the ER, Vitals were stable, satting 98% on RA. Lab results were unremarkable. CTA showed mutli lobar pulmonary embolism in right and left lung. Chart reviewed. Patient seen, examined, and discussed with . See Dr. Torie Hall note for more details. Multilobar PE in the right and left lung:   - Lovenox 1mg/kg BID  -  Hypercoagulable lab studies, echo  -  Continuous pulse ox, supplemental O2 PRN for SpO2 >90%  - Less likely to be infectious etiology given that patient is afebrile, no cough or sputum production or no white count. However, CT chest showed left basilar atelectasis or infiltrate. Low threshold to start abx if patient becomes febrile or white count start to trend up. Hypothyroidism: Check TSH , continue with levothyroxine 125mcg daily    Depression; Continue Celexa 40 mg PO daily      I agree with remaining assessment and plan as documented in Dr. Torie Hall note.       Pt discussed with Dr. Jason Carreon (on-call attending physician)    Cecilia Bob MD  Family Medicine Resident

## 2018-07-04 NOTE — ED NOTES
TRANSFER - OUT REPORT:    Verbal report given to Meron Uribe RN(name) on [place]  being transferred to [facility](unit) for routine progression of care       Report consisted of patients Situation, Background, Assessment and   Recommendations(SBAR). Information from the following report(s) SBAR, ED Summary, MAR, Recent Results and Cardiac Rhythm NSR was reviewed with the receiving nurse. Lines:   Peripheral IV 07/03/18 Right Antecubital (Active)   Site Assessment Clean, dry, & intact 7/3/2018  6:27 PM   Phlebitis Assessment 0 7/3/2018  6:27 PM   Infiltration Assessment 0 7/3/2018  6:27 PM   Dressing Status Clean, dry, & intact 7/3/2018  6:27 PM   Dressing Type Tape;Transparent 7/3/2018  6:27 PM   Hub Color/Line Status Pink;Patent; Flushed 7/3/2018  6:27 PM   Action Taken Blood drawn 7/3/2018  6:27 PM        Opportunity for questions and clarification was provided.       Patient transported with:   Monitor   EKG  EMTALA  20 gauge saline lock RAC

## 2018-07-04 NOTE — PROGRESS NOTES
[address] [place] [address]   Office [phone], Fax [phone]  Family Medicine Daily Progress Note: 7/4/2018      Assessment/Plan:   Stan Radford is a 24 y.o. female with hx hypothyroidism and OCP use who is admitted for multilobar Pulmonary embolism.     Multilobar pulmonary embolism - CTA on admission showed PE in R upper and lower, Left upper and lower, and right main pulmonary artery. Also showed L basilar atelectasis vs infiltrate with small pleural effusion. Lack of fever or leukocytosis favor atelectasis over infiltrate. Patient non-hypoxic and non-tachycardic at time of arrival. Patient taking OCPs for one month prior to arrival but lacks any recent travel, or prolonged immobility.   -admit to telemetry  -vitals per unit routine   -lovenox 1mg/kg BID   -hypercoagulable work up: protein c and s activity, factor V leiden, coagulation screen, antithrombin III panel, apolipoprotein A-1 (includes lupus anticoagulant, cardiolipin antibody, beta2 glypoprotein antibody)     Hypothyroidism 2/2 hashimotos thyroiditis - Last TSH 2.71 on 3/6/18.   -Repeat TSH elevated at 26.3.   -continue home synthroid 125mcg daily  -thyroid dysfunction has been associated with PE, however the association is usually with hyperthyroidism.   -may consider endocrine consult      Depression/Anxiety - well controlled on celexa 20mg daily   -continue home celexa 20mg daily     FEN/GI - General diet. Activity - as tolerated  DVT prophylaxis - Lovenox  GI prophylaxis -  none  Disposition - Plan to d/c to home      Subjective:     Patient reports continued discomfort in Left lateral chest. States it is a 6/10. She states she has been short of breath.      Review of Systems:   Review of Systems:  Constitutional: denies fever or chills   CV: denies CP or palpitations   Resp: Endorses SOB  GI: denies abd pain, n/v/d/c    Objective:     Physical examination  Patient Vitals for the past 12 hrs:   BP Temp Pulse Resp SpO2 Height Weight   18 0001 118/73 98 °F (36.7 °C) 67 16 100 % - -   18 2308 - - 76 - - - -   18 2204 117/70 98 °F (36.7 °C) 72 17 100 % - -   18 2100 121/77 98.2 °F (36.8 °C) - 18 99 % - -   18 2000 115/80 - - 18 99 % - -   18 1840 102/61 - - 18 100 % - -   18 1815 109/58 - - - 99 % - -   18 1800 94/68 - - - 98 % - -   18 1745 101/59 - - - 97 % - -   18 1732 109/66 99 °F (37.2 °C) 77 18 98 % 5' 3\" (1.6 m) 186 lb (84.4 kg)   18 1730 104/64 - - - 98 % - -     Temp (24hrs), Av.3 °F (36.8 °C), Min:98 °F (36.7 °C), Max:99 °F (37.2 °C)      Intake/Output Summary (Last 24 hours) at 18 0356  Last data filed at 18 2204   Gross per 24 hour   Intake                0 ml   Output              200 ml   Net             -200 ml          O2 Device: Room air     General:   Alert, cooperative, no acute distress   Head:   Atraumatic   Eyes:   Conjunctivae clear   ENT:  Oral mucosa normal   Neck:  Supple, trachea midline, no adenopathy   No JVD   Chest wall:    No tenderness or deformities    Lungs:   Left lower lung field with coarse breath sounds compared to R   Heart:   Regular rhythm, no murmur   Abdomen:    Soft, non-tender   No masses or organomegaly    Extremities:  No edema or DVT signs   Pulses:  Symmetric all extremities   Skin:  Warm and dry    No rashes or lesions   Neurologic:  Oriented   No focal deficits   Psychiatric:  normal mood and affect         Data Review:       Recent Labs      18   01118   WBC  9.8  9.6   HGB  11.0*  10.5*   HCT  35.4  32.2*   PLT  346  330     Recent Labs      18   0114  18   NA  141  137   K  3.6  4.0   CL  104  102   CO2  27  25   GLU  96  95   BUN  7  9   CREA  0.79  0.81   CA  9.1  9.1   ALB  3.4*  3.2*   SGOT  13*  13*   ALT  17  16   INR  1.0   --      Medications reviewed  Current Facility-Administered Medications   Medication Dose Route Frequency    citalopram (CELEXA) tablet 40 mg  40 mg Oral DAILY    levothyroxine (SYNTHROID) tablet 125 mcg  125 mcg Oral ACB    sodium chloride (NS) flush 5-10 mL  5-10 mL IntraVENous Q8H    sodium chloride (NS) flush 5-10 mL  5-10 mL IntraVENous PRN    acetaminophen (TYLENOL) tablet 650 mg  650 mg Oral Q4H PRN    enoxaparin (LOVENOX) injection 80 mg  1 mg/kg SubCUTAneous Q12H       Signed:   Kena Nascimento MD   Resident, Family Medicine    Patient discussed with Dr. Mike Mariscal , [facility] Medicine Attending

## 2018-07-04 NOTE — H&P
[address] [facility]   Admission H&P    Date of admission: 7/3/2018    Patient name: Mohsen Begum  MRN: [id number]  YOB: 1990  Age: 24 y.o. Primary care provider:  Louis Sanchez MD     Source of Information: patient, medical records    Chief complaint:  \"Left side pain\"    History of Present Illness  Mohsen Begum is a 24 y.o. female who presents to the ER complaining of Left rib pain. Patient reports having onset of Left rib pain one week ago, stating that she woke up with pain in her Left lateral chest around region of her ribs. She thought that the pain subsided initially so she did not get it checked. She states that when she went to see her PCP last Friday, she was doing well without complaints. Then the pain started again yesterday so she decided to go to the [facility] ED to be evaluated. Pt endorses dry cough and SOB at rest. Pt denies any CP or palpitations. She denies any recent travel or recent surgeries but does endorse starting OCPs for the past month. She denies any history of DVT/PE. Denies hx of multiple miscarriages. The only family member to have had DVT was her maternal aunt and it occurred after having surgery, otherwise no known family history of bleeding disorders. In the ER, vital signs were remarkable for T99.0, HR 77, /66, RR 18, SPO2 98% on RA . Labs were remarkable for WBC 9.6, Hgb 10.5, troponin <0.05. CTA chest showed multilobar PE in right upper and lower lobes, left upper and lower lobes, and right main pulmonary artery as well as Left basilar atelectasis vs infiltrate with small pleural effusion. Pt was treated with lovenox 80mg X1. Home Medications   Prior to Admission medications    Medication Sig Start Date End Date Taking? Authorizing Provider   norgestimate-ethinyl estradiol ([facility], ,) 0.25-35 mg-mcg tab Take 1 Tab by mouth daily.    Yes Phys Other, MD   citalopram (CELEXA) 40 mg tablet Take 1 Tab by mouth once over twenty-four (24) hours for 180 days. 18 Yes Breezy Larios MD   levothyroxine (SYNTHROID) 125 mcg tablet Take 1 Tab by mouth Daily (before breakfast).  18  Yes Breezy Larios MD       Allergies   Allergies   Allergen Reactions    Ancef [Cefazolin] Hives     Given at c/s, swelling of face/hives, tx'd with benadryl    Pcn [Penicillins] Hives     Facial edema     Peanut Hives    Chocolate [Cocoa] Hives    Citrus And Derivatives Nausea and Vomiting     Oranges only    Minford Hives    Beef Containing Products Hives    Chicken Derived Hives    Corn Hives    Egg Hives    Milk Hives    Milk Prot-Turm-Pepper-Pineappl Hives    Shellfish Derived Hives    Soy Hives    Strawberry Hives       Past Medical History:   Diagnosis Date    Environmental allergies     GERD (gastroesophageal reflux disease)     Hashimoto's thyroiditis 10/24/2011    Hashimoto's thyroiditis 10/24/2011    Hypotension     Hypothyroid 2011    Hypothyroidism     Psychiatric disorder     depression --2 yr old daughter  2013    Unspecified adverse effect of anesthesia     per patient with epidural had severe N&V and passed out       Past Surgical History:   Procedure Laterality Date    DELIVERY   11    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, congenital anomaly    HX  SECTION  2016       Family History   Problem Relation Age of Onset    Heart Disease Mother      miltral value prolapse    No Known Problems Father     Thyroid Disease Maternal Grandmother     Diabetes Paternal Grandmother     Diabetes Maternal Aunt     Cancer Paternal Grandfather      throat/stomach cancer,  at age 72    Breast Cancer Other      maternal great grandmother       Social History   Patient resides  x  Independently      With family care      Assisted living      SNF      Ambulates  x  Independently      With cane       Assisted walker           Alcohol history   x  None Social     Chronic     Smoking history  x  None     Former smoker: smoked 1-2 ppd for 1wk     Current smoker     History   Smoking Status    Former Smoker   Smokeless Tobacco    Never Used           Drug history  x  None     Former drug user     Current drug user     Sexual history  x  Sexually active      Not sexually active     Code status  x  Full code     DNR/DNI     Partial    Code status discussed with the patient/caregivers. Review of Systems    The following are negative unless bolded. General Fever, chills, fatigue, and unexpected weight change. HENT Ear pain, sinus pressure and sore throat. Eyes Visual disturbance. Respiratory Cough, chest tightness, shortness of breath and wheezing. Cardio Chest pain, palpitations and leg swelling. GI Nausea, vomiting, abd pain, melanic diarrhea, constipation.  Dysuria. Extremities Myalgias and arthralgias. Skin Color change and pallor. Neuro Weakness and headaches. Behavioral Confusion, nervous, anxious. Physical Exam  Visit Vitals    /70    Pulse 72    Temp 98 °F (36.7 °C)    Resp 17    Ht 5' 3\" (1.6 m)    Wt 186 lb (84.4 kg)    LMP 06/06/2018 (Approximate)    SpO2 100%    BMI 32.95 kg/m2        General: No acute distress. Alert. Cooperative. Head: Normocephalic. Atraumatic. Eyes:  Conjunctiva pink. Sclera white. PERRL. Ears:  Ear canals patent. TM non-erythematous. Nose:  Septum midline. Mucosa pink. No drainage. Throat: Mucosa pink. Moist mucous membranes. No tonsillar exudates or erythema. Palate movement equal bilaterally. Neck: Supple. Normal ROM. No stiffness. Respiratory: CTAB. No w/r/r/c.   Cardiovascular: RRR. Normal S1,S2. No m/r/g. Pulses 2+ throughout. GI: + bowel sounds. Nontender. No rebound tenderness or guarding. Nondistended. Extremities: No edema. No palpable cord. No tenderness. Musculoskeletal: Full ROM in all extremities. Neuro: CN II-XII grossly intact.  Strength 5/5 in all extremities. Sensation intact in all extremities. DTRs 2+ throughout. Skin: Clear. No rashes. No ulcers. : Deferred   Rectal: Deferred       Laboratory Data  Recent Results (from the past 24 hour(s))   EKG, 12 LEAD, INITIAL    Collection Time: 07/03/18  6:17 PM   Result Value Ref Range    Ventricular Rate 76 BPM    Atrial Rate 76 BPM    P-R Interval 160 ms    QRS Duration 92 ms    Q-T Interval 400 ms    QTC Calculation (Bezet) 450 ms    Calculated P Axis 45 degrees    Calculated R Axis 2 degrees    Calculated T Axis 21 degrees    Diagnosis       Normal sinus rhythm  Possible Left atrial enlargement  Left ventricular hypertrophy  Abnormal ECG     D DIMER    Collection Time: 07/03/18  6:24 PM   Result Value Ref Range    D-dimer 2.51 (H) 0.00 - 0.65 mg/L FEU   POC CHEM8    Collection Time: 07/03/18  6:54 PM   Result Value Ref Range    Calcium, ionized (POC) 1.19 1.12 - 1.32 mmol/L    Sodium (POC) 139 136 - 145 mmol/L    Potassium (POC) 3.6 3.5 - 5.1 mmol/L    Chloride (POC) 103 98 - 107 mmol/L    CO2 (POC) 26 21 - 32 mmol/L    Anion gap (POC) 16 10 - 20 mmol/L    Glucose (POC) 100 65 - 100 mg/dL    BUN (POC) 7 (L) 9 - 20 mg/dL    Creatinine (POC) 0.8 0.6 - 1.3 mg/dL    GFRAA, POC >60 >60 ml/min/1.73m2    GFRNA, POC >60 >60 ml/min/1.73m2    Hematocrit (POC) 33 (L) 35.0 - 47.0 %    Comment Comment Not Indicated.      HCG URINE, QL. - POC    Collection Time: 07/03/18  6:55 PM   Result Value Ref Range    Pregnancy test,urine (POC) NEGATIVE  NEG     TROPONIN I    Collection Time: 07/03/18  7:48 PM   Result Value Ref Range    Troponin-I, Qt. <0.05 <0.05 ng/mL   CBC WITH AUTOMATED DIFF    Collection Time: 07/03/18  7:48 PM   Result Value Ref Range    WBC 9.6 3.6 - 11.0 K/uL    RBC 3.79 (L) 3.80 - 5.20 M/uL    HGB 10.5 (L) 11.5 - 16.0 g/dL    HCT 32.2 (L) 35.0 - 47.0 %    MCV 85.0 80.0 - 99.0 FL    MCH 27.7 26.0 - 34.0 PG    MCHC 32.6 30.0 - 36.5 g/dL    RDW 13.3 11.5 - 14.5 %    PLATELET 312 717 - 581 K/uL MPV 9.8 8.9 - 12.9 FL    NEUTROPHILS 68 32 - 75 %    LYMPHOCYTES 22 12 - 49 %    MONOCYTES 9 5 - 13 %    EOSINOPHILS 1 0 - 7 %    BASOPHILS 0 0 - 1 %    ABS. NEUTROPHILS 6.5 1.8 - 8.0 K/UL    ABS. LYMPHOCYTES 2.1 0.8 - 3.5 K/UL    ABS. MONOCYTES 0.9 0.0 - 1.0 K/UL    ABS. EOSINOPHILS 0.1 0.0 - 0.4 K/UL    ABS. BASOPHILS 0.0 0.0 - 0.1 K/UL    DF AUTOMATED      XXWBCSUS 0     METABOLIC PANEL, COMPREHENSIVE    Collection Time: 07/03/18  7:48 PM   Result Value Ref Range    Sodium 137 136 - 145 mmol/L    Potassium 4.0 3.5 - 5.1 mmol/L    Chloride 102 97 - 108 mmol/L    CO2 25 21 - 32 mmol/L    Anion gap 10 5 - 15 mmol/L    Glucose 95 65 - 100 mg/dL    BUN 9 6 - 20 MG/DL    Creatinine 0.81 0.55 - 1.02 MG/DL    BUN/Creatinine ratio 11 (L) 12 - 20      GFR est AA >60 >60 ml/min/1.73m2    GFR est non-AA >60 >60 ml/min/1.73m2    Calcium 9.1 8.5 - 10.1 MG/DL    Bilirubin, total 0.2 0.2 - 1.0 MG/DL    ALT (SGPT) 16 12 - 78 U/L    AST (SGOT) 13 (L) 15 - 37 U/L    Alk. phosphatase 80 45 - 117 U/L    Protein, total 7.8 6.4 - 8.2 g/dL    Albumin 3.2 (L) 3.5 - 5.0 g/dL    Globulin 4.6 (H) 2.0 - 4.0 g/dL    A-G Ratio 0.7 (L) 1.1 - 2.2       Imaging    IMPRESSION:  1. Pulmonary embolus in both right upper and lower lobes, left upper and lower  lobes and the right main pulmonary artery. 2. Left basilar atelectasis or infiltrate with small pleural effusion. 3. Segmental overinflation incidentally noted in the right lower lobe. EKG:  normal sinus rhythm, possible left atrial enlargment, LVH. Assessment and Plan   Michael Patel is a 24 y.o. female who is admitted for multilobar Pulmonary embolism. Multilobar pulmonary embolism - CTA on admission showed PE in R upper and lower, Left upper and lower, and right main pulmonary artery. Also showed L basilar atelectasis vs infiltrate with small pleural effusion. Lack of fever or leukocytosis favor atelectasis over infiltrate.  Patient non-hypoxic and non-tachycardic at time of arrival. Patient taking OCPs for one month prior to arrival but lacks any recent travel, or prolonged immobility.   -admit to telemetry  -vitals per unit routine   -lovenox 1mg/kg BID   -hypercoagulable work up: protein c and s activity, factor V leiden, coagulation screen, antithrombin III panel, apolipoprotein A-1 (includes lupus anticoagulant, cardiolipin antibody, beta2 glypoprotein antibody)    Hypothyroidism 2/2 hashimotos thyroiditis - Last TSH 2.71 on 3/6/18.   -will repeat TSH  -continue home synthroid     Depression/Anxiety - well controlled on celexa 20mg daily   -continue home celexa 20mg daily      FEN/GI - General diet.    Activity - as tolerated  DVT prophylaxis - Lovenox  GI prophylaxis -  none  Disposition - Plan to d/c to home     CODE STATUS:  Home       Patient discussed with Dr. Ry Sparrow (Family Medicine Attending)       Nalini Pereira MD  [address] [facility] [place] 2[place] [facility]  Date Reviewed: 6/29/2018          Codes Class Noted POA    [facility] ICD-10-CM: I26.99  ICD-9-CM: 415.19  7/3/2018 Unknown

## 2018-07-04 NOTE — PROGRESS NOTES
7/4/2018 11:06 AM Met with pt and pt's family. Charted address and phone number confirmed. Reason for Admission:  Pulmonary embolism                      RRAT Score:  7                   Plan for utilizing home health: not indicated, no history                         Likelihood of Readmission: low                          Transition of Care Plan: home     Pt lives with 2 roommates in [place], [place]. 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 Pt was independent with adls and driving prior to admission. Pt does not have rx coverage and is needing lovenox for 2 weeks after discharge. Carefund application completed with pt and sent to [facility] for approval. [employer], spoke with Chavo who reported they close at 12PM today and they do not have lovenox in stock but can order it to be delivered on 7/5. CM also called Ami on [facility] that participated with the CareFund, they were not open today due to the holiday. CM faxed pt's script to lovenox to [facility] to be filled on 7/5. Pt is aware lovenox [facility] Confirmed with Christiano Butler script was received. Referral was also sent to [employer] to screen pt for potential health insurance and CareCard. MYRA Gutierrez     Care Management Interventions  PCP Verified by CM: Yes Mary Smoker)  Transition of Care Consult (CM Consult): Other  MyChart Signup: No  Discharge Durable Medical Equipment: No  Physical Therapy Consult: No  Occupational Therapy Consult: No  Speech Therapy Consult: No  Current Support Network:  Other (Lives with 2 roommates)  Confirm Follow Up Transport: Family

## 2018-07-04 NOTE — PROGRESS NOTES
Per chart review patient received first dose of lovenox at 8pm in the ED on admission. Hypercoag labs were drawn at 1:14am, several hours after lovenox dose. There was concern for lovenox compromising the clinical accuracy of the hypercoag labs. Called the lab to see if there was any blood on hold from the ED prior to administration of lovenox (as she had basic labs on admission). Lab says they do not have enough blood to do so.      Estefani Nixon MD

## 2018-07-04 NOTE — PROGRESS NOTES
[facility] Dosing Services: 7/4/2018    Consult for Warfarin Dosing by Pharmacy by Dr. Sergey Henao provided for this 24 y.o.  female , for indication of Pulmonary Embolism. Day of Therapy - 1  Dose to achieve an INR goal of 2-3    - Warfarin 5 mg dose already ordered and given this morning. Significant drug interactions: Lovenox  Previous dose given New start    PT/INR Lab Results   Component Value Date/Time    INR 1.0 07/04/2018 01:14 AM      Platelets Lab Results   Component Value Date/Time    PLATELET 314 29/99/0570 01:14 AM      H/H Lab Results   Component Value Date/Time    HGB 11.0 (L) 07/04/2018 01:14 AM        Pharmacy to follow daily and will provide subsequent Warfarin dosing based on clinical status.   SHANT Sr)  Contact information [phone]

## 2018-07-04 NOTE — PROGRESS NOTES
2230  TRANSFER - IN REPORT:    Verbal report received from [employer] (name) on [place]  being received from [facility] (unit) for routine progression of care      Report consisted of patients Situation, Background, Assessment and   Recommendations(SBAR). Information from the following report(s) SBAR, Kardex, Intake/Output, MAR, Accordion and Recent Results was reviewed with the receiving nurse. Opportunity for questions and clarification was provided. Assessment completed upon patients arrival to unit and care assumed. Initial assessment done. Pain on the left rib radiating to the back. MD on the bedside. Family on the bedside. Visit Vitals    BP 92/53 (BP 1 Location: Left arm, BP Patient Position: At rest)    Pulse 69    Temp 98.1 °F (36.7 °C)    Resp 18    Ht 5' 3\" (1.6 m)    Wt 84.4 kg (186 lb)    LMP 06/06/2018 (Approximate)    SpO2 99%    BMI 32.95 kg/m2         0520  Md aware about patient's pain despite giving Tylenol. No orders given. He'll come to check on her. 0715  Bedside and Verbal shift change report given to [facility] (oncoming nurse) by Rosangela Watkins RN (offgoing nurse). Report included the following information SBAR, Kardex, Intake/Output, MAR, Accordion and Recent Results.

## 2018-07-04 NOTE — ED NOTES
TRANSPORTED VIA [facility] TO [facility] at  via stretcher with monitor and saline lock. Continues having chest pain that varies in intensity.

## 2018-07-04 NOTE — DISCHARGE SUMMARY
[facility] [address] [place] [address]   Office [phone]  Fax [phone]       Discharge / Transfer / Off-Service Note     Name: Clinton Brady MRN: [id number]  Sex: Female   YOB: 1990  Age: 24 y.o. PCP: Kaleb Florian MD     Date of admission: 7/3/2018  Date of discharge/transfer: 7/5/2018    Attending physician at admission: Dr. Beatriz Leon    Attending physician at discharge/transfer: Dr. Beatriz Leon    Resident physician at discharge/transfer: Kayce Dyer MD     Consultants during hospitalization  None      Admission diagnoses   Pulmonary embolism [facility])    Recommended follow-up after discharge  1. PCP- Jaimie Ayala- 7/6/2018 at 11.15 (arrive at 11)  2. Heme- Beth Bolaños- 7/20/18 at 8 am     Things to follow up on with PCP:  1. Warfarin dosing to have optimal INR   2. TSH level check and dosing change if needed to ensure not an acute elevation due to blood clot    3. Discuss copper IUD- non hormonal contraceptive option      Medication Changes:  1. New Medications: Warfarin 5 mg daily, Lovenox 80 mg injection/twice daily   2. Modified Medications: Levothyroxine switched to 137 mcg from 125 mcg  3. Discontinued Medications: Sprintec     History of Present Illness  Per admitting provider,    Clinton Brady is a 24 y.o. female who presents to the ER complaining of Left rib pain. Patient reports having onset of Left rib pain one week ago, stating that she woke up with pain in her Left lateral chest around region of her ribs. She thought that the pain subsided initially. She states that when she went to see her PCP last Friday, she was doing well without complaints. Then the pain started again yesterday so she decided to go to the [facility] ED to be evaluated. Pt endorses dry cough and SOB at rest. Pt denies any CP or palpitations. She denies any recent travel or recent surgeries but does endorse starting OCPs for the past month.  She denies any history of DVT/PE. Denies hx of multiple miscarriages. The only family member to have had DVT was her maternal aunt and it occurred after having surgery, otherwise no known family history of bleeding disorders.      In the ER, vital signs were remarkable for T99.0, HR 77, /66, RR 18, SPO2 98% on RA . Labs were remarkable for WBC 9.6, Hgb 10.5, troponin <0.05. CTA chest showed multilobar PE in right upper and lower lobes, left upper and lower lobes, and right main pulmonary artery as well as Left basilar atelectasis vs infiltrate with small pleural effusion. Pt was treated with lovenox 80mg X1. Holden Schultz is a 24 y.o. female admitted for multilobar pulmonary embolism with a hx of hypothyroidism and OCP. She has spent 2 night(s) in the hospital.      Multilobar pulmonary embolism - CTA on admission showed PE in R upper and lower, Left upper and lower, and right main pulmonary artery. Also showed L basilar atelectasis vs infiltrate with small pleural effusion. Lack of fever or leukocytosis favor atelectasis over infiltrate. Patient non-hypoxic and non-tachycardic at time of arrival. Patient taking OCPs for one month prior to arrival but lacks any recent travel, or prolonged immobility. Patient was started on lovenox 1mg/kg BID. Hypercoagulable work up include- protein c and s activity, factor V leiden, coagulation screen, antithrombin III panel, apolipoprotein A-1 (includes lupus anticoagulant, cardiolipin antibody, beta2 glypoprotein antibody). Patient was discharged on Lovenox injections twice daily and 5 mg warfarin with an appointment with PCP tomorrow 7/6 to optimize INR levels      Hypothyroidism 2/2 hashimotos thyroiditis - Last TSH was performed 2.71 on 3/6/18. Repeat TSH elevated at 26.3. We increased home synthroid from 125mcg to 137. 5mcg       Depression/Anxiety -  She is well controlled on celexa 20mg daily.  Plan to continue home celexa 20mg daily      Physical exam at discharge:    Vitals Reviewed. General Oriented to person, place, and time and well-developed. Appears well-nourished, no distress. Not diaphoretic. Cardio Normal rate, regular rhythm. Exam reveals no gallop and no friction rub. No murmur heard. No chest wall tenderness. Pulmonary LLB Rhonchi noted    Abdominal Soft. Bowel sounds normal. No distension. No tenderness.  Deferred. Extremities No edema of lower extremities. No tenderness. Distal pulses intact. Neurological Alert and oriented to person, place, and time. Dermatology Skin is warm and dry. No rash noted. No erythema or pallor. Psychiatric Affect and judgment normal.        Condition at discharge: Stable. Labs  Recent Labs      07/05/18   0600 07/04/18 0114 07/03/18 1948   WBC  7.4  9.8  9.6   HGB  10.7*  11.0*  10.5*   HCT  34.2*  35.4  32.2*   PLT  310  346  330     Recent Labs      07/05/18 0047 07/04/18 0114 07/03/18 1948   NA  138  141  137   K  4.1  3.6  4.0   CL  104  104  102   CO2  27  27  25   BUN  8  7  9   CREA  0.76  0.79  0.81   GLU  76  96  95   CA  9.1  9.1  9.1     Recent Labs      07/05/18 0047 07/04/18 0114 07/03/18 1948   SGOT  11*  13*  13*   ALT  15  17  16   AP  75  87  80   TBILI  0.1*  0.2  0.2   TP  7.7  7.4  7.8   ALB  3.2*  3.4*  3.2*   GLOB  4.5*  4.0  4.6*     Recent Labs      07/05/18 0047 07/04/18 0114 07/03/18 1948 07/03/18   1854   TROIQ   --    --   <0.05   --    INR  0.9  1.0   --    --    PTP  9.5  9.8   --    --    APTT   --   30.8   --    --    GLUCPOC   --    --    --   100     Cultures  · None     Procedures / Diagnostic Studies  · None     Imaging    Results from [place] encounter on 07/03/18   XR CHEST PA LAT   Narrative INDICATION:  cp. EXAM: 2 VIEW CHEST RADIOGRAPH. COMPARISON: 11/29/2017. FINDINGS: Frontal and lateral views of the chest show clear lungs. . The heart,  mediastinum and pulmonary vasculature are stable.   The bony thorax is  unremarkable for age, except for stable thoracic dextroscoliosis. . ..         Impression IMPRESSION:  No acute cardiopulmonary disease radiographically. .  . Results from [place] encounter on 10/26/15    PREG UTS LTD   Narrative **Final Report**      ICD Codes / Adm. Diagnosis: V28.9  Z36 / Unspecified  screenin    Encounter for  scre  Examination:   OB LTD  - [id number] - Oct 26 2015  1:00PM  Accession No:  [id number]  Reason:  growth      REPORT:  See impression. IMPRESSION:      The final result for this exam can be located in this patient's chart with   results from [facility]. Signing/Reading Doctor: Douglas Hogue ([id number])    Myrtle Jovel ([id number])  Oct 26 2015  1:03PM                                         Results from Hospital Encounter encounter on 18   CTA CHEST W OR W WO CONT   Narrative EXAM: CT ANGIOGRAPHY CHEST     INDICATION: Left Chest and rib pain, acute, pulmonary embolism (PE) suspected. Pain radiates to the back, chest and left shoulder. COMPARISON: 2014. CONTRAST: 100 mL of Isovue-370. TECHNIQUE:   Precontrast  images were obtained to localize the volume for acquisition. Multislice helical CT arteriography was performed from the diaphragm to the  thoracic inlet during uneventful rapid bolus intravenous contrast  administration. Lung and soft tissue windows were generated. Coronal and  sagittal  reformatted images were also generated. 3D post processing consisting  of coronal maximum intensity projection images was performed. CT dose reduction  was achieved through use of a standardized protocol tailored for this  examination and automatic exposure control for dose modulation. FINDINGS:  Left basilar atelectasis or infiltrate with left pleural effusion, small. .  Segmental overinflation in the right lower lobe incidentally noted.     Filling defects are noted in the right main pulmonary artery, extending into  segmental branch arteries on the right. On the left, there are filling defects  and upper lobe and lower lobe branch pulmonary arteries, but none in the main  pulmonary artery or main pulmonary artery outflow tract. .    There is no mediastinal or hilar adenopathy or mass. The aorta enhances normally  without evidence of aneurysm or dissection. The visualized portions of the upper abdominal organs are normal. Incidentally  noted thoracic dextroscoliosis. Impression IMPRESSION:  1. Pulmonary embolus in both right upper and lower lobes, left upper and lower  lobes and the right main pulmonary artery. 2. Left basilar atelectasis or infiltrate with small pleural effusion. 3. Segmental overinflation incidentally noted in the right lower lobe. The findings were called to Dr. Slade Phillips on 7/3/2018 at 1930 hours by Dr. Carmella Still. [phone]                   No procedure found.       Chronic diagnoses   Problem List as of 2018  Date Reviewed: 2018          Codes Class Noted - Resolved    Anxiety and depression ICD-10-CM: F41.9, F32.9  ICD-9-CM: 300.00, 266  2018 - Present        Pulmonary embolism ([facility] [address] ICD-10-CM: I26.99  ICD-9-CM: 415.19  7/3/2018 - Present        Previous  delivery affecting pregnancy ICD-10-CM: O34.219  ICD-9-CM: 654.20  2015 - Present        Pregnancy ICD-10-CM: Z34.90  ICD-9-CM: V22.2  2015 - Present        Abnormal chromosomal and genetic finding on  screening of mother ICD-10-CM: O28.5  ICD-9-CM: 796.5  10/26/2015 - Present    Overview Signed 10/26/2015  2:16 PM by Angelina Altamirano MD     inc risk DS on NT, normal FS             H/O:  ICD-10-CM: Z98.891  ICD-9-CM: V45.89  2015 - Present        Supervision of other normal pregnancy ICD-10-CM: Z34.80  ICD-9-CM: V22.1  2015 - Present    Overview Addendum 2015  1:16 PM by Angelina Altamirano MD     5/6/15 GBS + in urine  H/o loss of 3 yo daughter and prolonged NICU stay, 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 Stone Staff  Depression: d/c celexa  S/p gc, decl NIFT  7/13/15 pt declined AFP today  Inc risk for DS on NT: FS: XX  8/21/15 Urine Cx negative  Decline flu vaccine (allergy)  11/30/15  consent form at [place]  Right pyelectasis > 1 cm 32w6d d/w Dr. Mary Ferrarass: FU MFM 36 wk; fu prn 5/6mm  H/o cs: desires  then r cs, acog h/o given, consent done, op note reiewed, +allergic reaction to ancef  9/28/15 1 hr glucola - 92; HGB - 10.3; HCT - 32.0  Repeat C/S [email or web address]. Patient aware. Instructions mailed:   preop abx planned: clindamycin (900 mg) plus gentamicin (5 mg/kg) IV  ANT plac             H/O idiopathic urticaria ICD-10-CM: Z87.2  ICD-9-CM: V13.3  2012 - Present        Hashimoto's thyroiditis ICD-10-CM: E06.3  ICD-9-CM: 245.2  10/24/2011 - Present        Hypothyroid ICD-10-CM: E03.9  ICD-9-CM: 244.9  2011 - Present        Multiple allergies ICD-10-CM: Z88.9  ICD-9-CM: V15.09  2011 - Present              Discharge/Transfer Medications  Discharge Medication List as of 2018 11:39 AM      START taking these medications    Details   enoxaparin (LOVENOX) 80 mg/0.8 mL injection 80 mg by SubCUTAneous route every twelve (12) hours for 14 days. , Print, Disp-22.4 mL, R-0         CONTINUE these medications which have CHANGED    Details   levothyroxine (SYNTHROID) 137 mcg tablet Take 137.5 mcg by mouth Daily (before breakfast). , Print, Disp-30 Tab, R-0      warfarin (COUMADIN) 2.5 mg tablet Take 2 Tabs by mouth daily. Follow up with PCP for regular labs, they will adjust your warfarin as needed. , Print, Disp-28 Tab, R-0         CONTINUE these medications which have NOT CHANGED    Details   citalopram (CELEXA) 40 mg tablet Take 1 Tab by mouth once over twenty-four (24) hours for 180 days. , Normal, Disp-90 Tab, R-1         STOP taking these medications       norgestimate-ethinyl estradiol ([facility], ,) 0.25-35 mg-mcg tab Comments:   Reason for Stopping:                Diet:  Regular diet.     Activity:  As tolerated    Disposition: Home or Self Care    Discharge instructions to patient/family  Please seek medical attention for any new or worsening symptoms particularly fever, chest pain, shortness of breath, abdominal pain, nausea, vomiting    Follow up plans/appointments  Follow-up Information     Follow up With Details Comments [address], [place] Go on 7/6/2018 Hospital f/u at 11.15- arrive at [address] [facility] [address]      Carloz Madden MD Go on 7/20/2018 Appoitment at 8 am  [address]  [address] [facility]             Waqar Angulo MD  Family Medicine Resident       For Billing    Chief Complaint   Patient presents with    Rib Pain       Hospital Problems  Date Reviewed: 6/29/2018          Codes Class Noted POA    Anxiety and depression ICD-10-CM: F41.9, F32.9  ICD-9-CM: 300.00, 311  7/5/2018 Unknown        Pulmonary embolism ([facility] [address] ICD-10-CM: I26.99  ICD-9-CM: 415.19  7/3/2018 Unknown        Hypothyroid ICD-10-CM: E03.9  ICD-9-CM: 244.9  8/9/2011 Yes

## 2018-07-04 NOTE — ED NOTES
TRANSFER - OUT REPORT:    Verbal report given to [facility] with SABA(name) on [place]  being transferred to [facility] 327(unit) for routine progression of care       Report consisted of patients Situation, Background, Assessment and   Recommendations(SBAR). Information from the following report(s) ED Summary was reviewed with the receiving nurse. Lines:   Peripheral IV 07/03/18 Right Antecubital (Active)   Site Assessment Clean, dry, & intact 7/3/2018  6:27 PM   Phlebitis Assessment 0 7/3/2018  6:27 PM   Infiltration Assessment 0 7/3/2018  6:27 PM   Dressing Status Clean, dry, & intact 7/3/2018  6:27 PM   Dressing Type Tape;Transparent 7/3/2018  6:27 PM   Hub Color/Line Status Pink;Patent; Flushed 7/3/2018  6:27 PM   Action Taken Blood drawn 7/3/2018  6:27 PM        Opportunity for questions and clarification was provided.       Patient transported with:   Monitor   EKG  EMTALA  20 gauge saline lock RAC

## 2018-07-04 NOTE — PROGRESS NOTES
Problem: Falls - Risk of  Goal: *Absence of Falls  Document Leslie Fall Risk and appropriate interventions in the flowsheet.   Outcome: Progressing Towards Goal  Fall Risk Interventions:

## 2018-07-04 NOTE — PROGRESS NOTES
Bedside and Verbal shift change report given to Deanna Santiago (oncoming nurse) by Vianey Bradley (offgoing nurse). Report included the following information SBAR, Kardex Accordion and Recent Results      Bedside and Verbal shift change report given to Fer Penn (oncoming nurse) by [address] (offgoing nurse).  Report included the following information DEIRDRE, Madeleine Lefort and Recent Results

## 2018-07-05 ENCOUNTER — TELEPHONE (OUTPATIENT)
Dept: FAMILY MEDICINE CLINIC | Age: 28
End: 2018-07-05

## 2018-07-05 VITALS
TEMPERATURE: 98.1 F | SYSTOLIC BLOOD PRESSURE: 108 MMHG | HEIGHT: 63 IN | BODY MASS INDEX: 32.96 KG/M2 | WEIGHT: 186 LBS | DIASTOLIC BLOOD PRESSURE: 77 MMHG | RESPIRATION RATE: 18 BRPM | OXYGEN SATURATION: 100 % | HEART RATE: 66 BPM

## 2018-07-05 PROBLEM — F41.9 ANXIETY AND DEPRESSION: Status: ACTIVE | Noted: 2018-07-05

## 2018-07-05 PROBLEM — F32.A ANXIETY AND DEPRESSION: Status: ACTIVE | Noted: 2018-07-05

## 2018-07-05 LAB
ALBUMIN SERPL-MCNC: 3.2 G/DL (ref 3.5–5)
ALBUMIN/GLOB SERPL: 0.7 {RATIO} (ref 1.1–2.2)
ALP SERPL-CCNC: 75 U/L (ref 45–117)
ALT SERPL-CCNC: 15 U/L (ref 12–78)
ANION GAP SERPL CALC-SCNC: 7 MMOL/L (ref 5–15)
AST SERPL-CCNC: 11 U/L (ref 15–37)
ATRIAL RATE: 76 BPM
BILIRUB SERPL-MCNC: 0.1 MG/DL (ref 0.2–1)
BUN SERPL-MCNC: 8 MG/DL (ref 6–20)
BUN/CREAT SERPL: 11 (ref 12–20)
CALCIUM SERPL-MCNC: 9.1 MG/DL (ref 8.5–10.1)
CALCULATED P AXIS, ECG09: 45 DEGREES
CALCULATED R AXIS, ECG10: 2 DEGREES
CALCULATED T AXIS, ECG11: 21 DEGREES
CHLORIDE SERPL-SCNC: 104 MMOL/L (ref 97–108)
CO2 SERPL-SCNC: 27 MMOL/L (ref 21–32)
CREAT SERPL-MCNC: 0.76 MG/DL (ref 0.55–1.02)
DIAGNOSIS, 93000: NORMAL
ERYTHROCYTE [DISTWIDTH] IN BLOOD BY AUTOMATED COUNT: 13.1 % (ref 11.5–14.5)
GLOBULIN SER CALC-MCNC: 4.5 G/DL (ref 2–4)
GLUCOSE SERPL-MCNC: 76 MG/DL (ref 65–100)
HCT VFR BLD AUTO: 34.2 % (ref 35–47)
HGB BLD-MCNC: 10.7 G/DL (ref 11.5–16)
INR PPP: 0.9 (ref 0.9–1.1)
MCH RBC QN AUTO: 26.5 PG (ref 26–34)
MCHC RBC AUTO-ENTMCNC: 31.3 G/DL (ref 30–36.5)
MCV RBC AUTO: 84.7 FL (ref 80–99)
NRBC # BLD: 0 K/UL (ref 0–0.01)
NRBC BLD-RTO: 0 PER 100 WBC
P-R INTERVAL, ECG05: 160 MS
PLATELET # BLD AUTO: 310 K/UL (ref 150–400)
PMV BLD AUTO: 9.2 FL (ref 8.9–12.9)
POTASSIUM SERPL-SCNC: 4.1 MMOL/L (ref 3.5–5.1)
PROT SERPL-MCNC: 7.7 G/DL (ref 6.4–8.2)
PROTHROMBIN TIME: 9.5 SEC (ref 9–11.1)
Q-T INTERVAL, ECG07: 400 MS
QRS DURATION, ECG06: 92 MS
QTC CALCULATION (BEZET), ECG08: 450 MS
RBC # BLD AUTO: 4.04 M/UL (ref 3.8–5.2)
SODIUM SERPL-SCNC: 138 MMOL/L (ref 136–145)
VENTRICULAR RATE, ECG03: 76 BPM
WBC # BLD AUTO: 7.4 K/UL (ref 3.6–11)

## 2018-07-05 PROCEDURE — 74011250637 HC RX REV CODE- 250/637: Performed by: STUDENT IN AN ORGANIZED HEALTH CARE EDUCATION/TRAINING PROGRAM

## 2018-07-05 PROCEDURE — 80053 COMPREHEN METABOLIC PANEL: CPT | Performed by: STUDENT IN AN ORGANIZED HEALTH CARE EDUCATION/TRAINING PROGRAM

## 2018-07-05 PROCEDURE — 85027 COMPLETE CBC AUTOMATED: CPT

## 2018-07-05 PROCEDURE — 74011250636 HC RX REV CODE- 250/636: Performed by: STUDENT IN AN ORGANIZED HEALTH CARE EDUCATION/TRAINING PROGRAM

## 2018-07-05 PROCEDURE — 85610 PROTHROMBIN TIME: CPT

## 2018-07-05 PROCEDURE — 36415 COLL VENOUS BLD VENIPUNCTURE: CPT | Performed by: STUDENT IN AN ORGANIZED HEALTH CARE EDUCATION/TRAINING PROGRAM

## 2018-07-05 PROCEDURE — 74011250637 HC RX REV CODE- 250/637: Performed by: FAMILY MEDICINE

## 2018-07-05 RX ORDER — WARFARIN SODIUM 5 MG/1
5 TABLET ORAL ONCE
Status: DISCONTINUED | OUTPATIENT
Start: 2018-07-05 | End: 2018-07-05 | Stop reason: HOSPADM

## 2018-07-05 RX ORDER — LEVOTHYROXINE SODIUM 137 UG/1
137.5 TABLET ORAL
Qty: 30 TAB | Refills: 0 | Status: SHIPPED | OUTPATIENT
Start: 2018-07-05 | End: 2018-08-02 | Stop reason: SDUPTHER

## 2018-07-05 RX ADMIN — ENOXAPARIN SODIUM 80 MG: 80 INJECTION SUBCUTANEOUS at 08:39

## 2018-07-05 RX ADMIN — CITALOPRAM HYDROBROMIDE 40 MG: 20 TABLET ORAL at 08:45

## 2018-07-05 RX ADMIN — LEVOTHYROXINE SODIUM 137.5 MCG: 25 TABLET ORAL at 08:45

## 2018-07-05 NOTE — DISCHARGE INSTRUCTIONS
HOME DISCHARGE INSTRUCTIONS    Gautam Waddell / [id number] : 1990    Admission date: 7/3/2018 Discharge date: 2018     Please bring this form with you to show your care provider at your follow-up appointment. Primary care provider:  Jenni White MD    Discharging provider:  Kimber Ochoa MD - Family Medicine Resident  Dr. Keenan Trimble - Attending, Family Medicine     You have been admitted to the hospital with the following diagnoses:    ACUTE DIAGNOSES:  Pulmonary embolism ([address]  . . . . . . . . . . . . . . . . . . . . . . . . . . . . . . . . . . . . . . . . . . . . . . . . . . . . . . . . . . . . . . . . . . . . . . . Yonas Mauricio FOLLOW-UP CARE RECOMMENDATIONS:    Appointments  Follow-up Information     Follow up With Details Comments [address], [place] Go on 2018 Hospital f/u at 11.15- arrive at [address]  [address] [place], [place] Go on 2018 Appoitment at 8 am  [address] [facility]  [address]  [phone]             Please follow up with your PCP regardin. Blood clot and medication dosing, regular INR checks  2. Recheck your TSH in a few weeks. It was elevated in the hospital but it can be elevated acutely due to the blood clot. 3. Follow up with PCP regularly, they will need to check your INR (lab) to check your clotting and will adjust your medications as needed. MEDICATION CHANGES:  1. Take the lovenox injections twice a day. 2. Take the warfarin 5mg daily. 3. Levothyroxine 137mcg daily (increased from 125mcg)     Follow-up tests needed: INR    Pending test results: At the time of your discharge the following test results are still pending: Hypercoagulable workup - Protein C/S activity, Factor V leiden, coagulation screen, antithrombin III panel, apolipoprotein A-1 (lupus anticoagulant, cardiolipin antibody, beta2 glucoprotein antibody)   .    Please make sure you review these results with your outpatient follow-up provider(s). Specific symptoms to watch for: chest pain, shortness of breath, fever, chills, nausea, vomiting, diarrhea, change in mentation, falling, weakness, bleeding. DIET/what to eat:  Regular Diet    ACTIVITY:  Activity as tolerated    Wound care: None    Equipment needed:  None    What to do if new or unexpected symptoms occur? If you experience any of the above symptoms (or should other concerns or questions arise after discharge) please call your primary care physician. Return to the emergency room if you cannot get hold of your doctor. · It is very important that you keep your follow-up appointment(s). · Please bring discharge papers, medication list (and/or medication bottles) to your follow-up appointments for review by your outpatient provider(s). · Please check the list of medications and be sure it includes every medication (even non-prescription medications) that your provider wants you to take. · It is important that you take the medication exactly as they are prescribed. · Keep your medication in the bottles provided by the pharmacist and keep a list of the medication names, dosages, and times to be taken in your wallet. · Do not take other medications without consulting your doctor. · If you have any questions about your medications or other instructions, please talk to your nurse or care provider before you leave the hospital.     Information obtained by:     I understand that if any problems occur once I am at home I am to contact my physician. These instructions were explained to me and I had the opportunity to ask questions. I understand and acknowledge receipt of the instructions indicated above.                                                                                                                                                Physician's or R.N.'s Signature Date/Time                                                                                                                                              Patient or Representative Signature                                                          Date/Time

## 2018-07-05 NOTE — PROGRESS NOTES
Discharge instructions, including information on new medications, were reviewed with patient. Teach back method was used and all questions were answered. IV and heart monitor were removed. Patient received a copy of her discharge papers and 3 prescriptions. She also stated that she received and understood dietary precautions for Warfarin and had preformed self injection of Lovenox with out difficulty. Patient was discharged home with her family.

## 2018-07-05 NOTE — PROGRESS NOTES
Pt is being discharged today. Pt's PCP office notified of hospital discharge - I spoke to [facility] as NN is no longer with the practice. Lovenox approved through the [address]. Directions and phone number of Ricardo Angella given to patient. No other issues or concerns. Pt is ready to be discharged from cm standpoint.  Thanks Darwyn Bamberger, MSW

## 2018-07-05 NOTE — PROGRESS NOTES
[address] [place] [address]   Office [phone]  Fax [phone]          Assessment and [address] Trejo is a 24 y.o. female admitted for multilobar pulmonary embolism. She has spent 2 night(s) in the hospital.    24 Hour Events:      1. Increased Synthroid to 137. Ada Nephew is a 24 y.o. female with hx hypothyroidism and OCP use who is admitted for multilobar Pulmonary embolism.     Multilobar pulmonary embolism - CTA on admission showed PE in R upper and lower, Left upper and lower, and right main pulmonary artery. Also showed L basilar atelectasis vs infiltrate with small pleural effusion. Lack of fever or leukocytosis favor atelectasis over infiltrate. Patient non-hypoxic and non-tachycardic at time of arrival. Patient taking OCPs for one month prior to arrival but lacks any recent travel, or prolonged immobility.   -admit to telemetry  -vitals per unit routine   -lovenox 1mg/kg BID   -hypercoagulable work up: protein c and s activity, factor V leiden, coagulation screen, antithrombin III panel, apolipoprotein A-1 (includes lupus anticoagulant, cardiolipin antibody, beta2 glypoprotein antibody)     Hypothyroidism 2/2 hashimotos thyroiditis - Last TSH 2.71 on 3/6/18.   -Repeat TSH elevated at 26.3.   -increased home synthroid from 125mcg to 137. 5mcg   -thyroid dysfunction has been associated with PE, however the association is usually with hyperthyroidism.   -may consider endocrine consult      Depression/Anxiety - well controlled on celexa 20mg daily   -continue home celexa 20mg daily     FEN/GI - General diet.    Activity - as tolerated  DVT prophylaxis - Lovenox  GI prophylaxis -  none  Disposition - Plan to d/c to home       I appreciate the opportunity to participate in the care of this patient,  Astrid Baptiste MD  Family Medicine Resident         Subjective / Objective     Subjective Patient states she is still SOB and has chest tightness     Temp (24hrs), Av °F (36.7 °C), Min:97.7 °F (36.5 °C), Max:98.4 °F (36.9 °C)     Objective  Respiratory:   O2 Device: Room air   Visit Vitals    /77 (BP 1 Location: Left arm, BP Patient Position: At rest)    Pulse 66    Temp 98.1 °F (36.7 °C)    Resp 18    Ht 5' 3\" (1.6 m)    Wt 186 lb (84.4 kg)    SpO2 100%    BMI 32.95 kg/m2         General:   Alert, cooperative, no acute distress   Head:   Atraumatic   Eyes:   Conjunctivae clear   ENT:  Oral mucosa normal   Neck:  Supple, trachea midline, no adenopathy   No JVD   Chest wall:    No tenderness or deformities    Lungs:   Left lower lung field with coarse breath sounds   Heart:   Regular rhythm, no murmur   Extremities:  No edema or DVT signs   Pulses:  Symmetric all extremities   Skin:  Warm and dry    No rashes or lesions   Neurologic: Oriented, No focal deficits   Psychiatric:  normal mood and affect       I/O:    Date 18 - 18 - 18 0659   Shift 3210-2114 7590-0438 24 Hour Total 0876-7381 5553-8131 24 Hour Total   I  N  T  A  K  E   P.O.  240 240         P. O.  240 240       Shift Total  (mL/kg)  240  (2.8) 240  (2.8)      O  U  T  P  U  T   Urine  (mL/kg/hr)            Urine Occurrence(s)  2 x 2 x       Shift Total  (mL/kg)         NET  240 240      Weight (kg) 84.4 84.4 84.4 84.4 84.4 84.4       CBC:  Recent Labs      18   0600  18   0114  18   WBC  7.4  9.8  9.6   HGB  10.7*  11.0*  10.5*   HCT  34.2*  35.4  32.2*   PLT  310  346  412       Metabolic Panel:  Recent Labs      18   0047  18   0114  18   NA  138  141  137   K  4.1  3.6  4.0   CL  104  104  102   CO2  27  27  25   BUN  8  7  9   CREA  0.76  0.79  0.81   GLU  76  96  95   CA  9.1  9.1  9.1   ALB  3.2*  3.4*  3.2*   SGOT  11*  13*  13*   ALT  15  17  16   INR  0.9  1.0   --           For Billing    Chief Complaint   Patient presents with    Rib Pain       Hospital Problems  Date Reviewed: 2018 Codes Class Noted POA    [facility] ICD-10-CM: I26.99  ICD-9-CM: 415.19  7/3/2018 Unknown

## 2018-07-05 NOTE — PROGRESS NOTES
Pt was advised and verbalized understanding. Her forms were completed and letter with lab results and recommendations was sent along with her completed Student Physical Exam form to [facility] at [phone]. Pt was advised and has f/u appt tomorrow with Dr. Angela Pond.

## 2018-07-05 NOTE — PROGRESS NOTES
Bedside and Verbal shift change report given to Adelia Velazquez RN (oncoming nurse) by Kerwin Litten (offgoing nurse). Report included the following information SBAR and Kardex.

## 2018-07-05 NOTE — ED PROVIDER NOTES
Patient is a 24 y.o. female presenting with rib pain. Rib Pain   This is a new problem. The problem occurs intermittently. The current episode started more than 1 week ago. The problem has been gradually worsening. Associated symptoms include chest pain. Pertinent negatives include no fever, no ear pain, no neck pain, no cough, no sputum production, no hemoptysis, no wheezing, no syncope, no vomiting and no leg swelling. It is unknown what precipitated the problem. Risk factors include oral contraceptive use. She has tried nothing for the symptoms. She has had no prior hospitalizations. Associated medical issues do not include asthma, COPD, pneumonia, chronic lung disease or PE. 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, congenital anomaly    HX  SECTION  2016         Family History:   Problem Relation Age of Onset    Heart Disease Mother      miltral value prolapse    No Known Problems Father     Thyroid Disease Maternal Grandmother     Diabetes Paternal Grandmother     Diabetes Maternal Aunt     Cancer Paternal Grandfather      throat/stomach cancer,  at age 72    Breast Cancer Other      maternal great grandmother       Social History     Social History    Marital status: LEGALLY      Spouse name: N/A    Number of children: N/A    Years of education: N/A     Occupational History    Not on file.      Social History Main Topics    Smoking status: Former Smoker    Smokeless tobacco: Never Used    Alcohol use No    Drug use: No    Sexual activity: Yes     Partners: Male     Birth control/ protection: Condom     Other Topics Concern    Not on file     Social History Narrative         ALLERGIES: Ancef [cefazolin]; Pcn [penicillins]; Peanut; Chocolate [cocoa]; Citrus and derivatives; Gus Gant; Beef containing products; Chicken derived; Corn; Egg; Milk; Milk prot-turm-pepper-pineappl; Shellfish derived; Soy; and Tell    Review of Systems   Constitutional: Negative for fever. HENT: Negative for ear pain. Respiratory: Negative for cough, hemoptysis, sputum production and wheezing. Cardiovascular: Positive for chest pain. Negative for leg swelling and syncope. Gastrointestinal: Negative for vomiting. Musculoskeletal: Negative for neck pain. Vitals:    07/04/18 2301 07/05/18 0414 07/05/18 0700 07/05/18 0738   BP:  103/58  108/77   Pulse: 78 64 75 66   Resp:  20  18   Temp:  98.4 °F (36.9 °C)  98.1 °F (36.7 °C)   SpO2:  100%  100%   Weight:       Height:                Physical Exam   Constitutional: No distress. HENT:   Head: Normocephalic and atraumatic. Mouth/Throat: Oropharynx is clear and moist.   Eyes: Conjunctivae are normal. Pupils are equal, round, and reactive to light. No scleral icterus. Neck: Neck supple. No tracheal deviation present. Cardiovascular: Normal rate, regular rhythm and intact distal pulses. Pulmonary/Chest: Effort normal. No respiratory distress. She has no wheezes. She has no rales. She exhibits tenderness (left sided). Abdominal: Soft. She exhibits no distension. There is no tenderness. Genitourinary:   Genitourinary Comments: deferred   Musculoskeletal: She exhibits no edema or deformity. Neurological: She is alert. Skin: Skin is warm and dry. Psychiatric: She has a normal mood and affect. Nursing note and vitals reviewed.        MDM  Number of Diagnoses or Management Options  Other acute pulmonary embolism without acute cor pulmonale [facility]):   Diagnosis management comments: D dimer elevated.  Signed out to Dr. Yeni Guerra pending CTA chest        ED Course       Procedures

## 2018-07-05 NOTE — PROGRESS NOTES
Problem: Falls - Risk of  Goal: *Absence of Falls  Document Leslie Fall Risk and appropriate interventions in the flowsheet. Outcome: Progressing Towards Goal  Fall Risk Interventions:    call bell within reach  Bed rails upx3  Frequent rounds      Bedside shift change report given to [facility] (oncoming nurse) by Benitez Cline (offgoing nurse). Report included the following information SBAR, Kardex, Intake/Output, MAR, Accordion and Recent Results.

## 2018-07-05 NOTE — PROGRESS NOTES
[facility] Dosing Services: 7/5/2018    Consult for Warfarin Dosing by Pharmacy by Dr. Santiago Fu provided for this 24 y.o.  female , for indication of Pulmonary Embolism. Day of Therapy: 2  Dose to achieve an INR goal of 2-3     Order entered for Warfarin 5 mg to be given at 1800 today. Significant drug interactions: Enoxaparin 80 mg SC every 12 hours  PT/INR Lab Results   Component Value Date/Time    INR 0.9 07/05/2018 12:47 AM      Platelets Lab Results   Component Value Date/Time    PLATELET 193 63/80/1150 06:00 AM      H/H Lab Results   Component Value Date/Time    HGB 10.7 (L) 07/05/2018 06:00 AM        Pharmacist will follow daily and will provide subsequent Warfarin dosing based on clinical status.   Connie Russell, Pharmacist

## 2018-07-05 NOTE — TELEPHONE ENCOUNTER
A nurse from [facility] called to let Sealm Acosta know the pt will be discharged today and will be following up with pcp.

## 2018-07-05 NOTE — PROGRESS NOTES
Bedside and Verbal shift change report given to [facility] (oncoming nurse) by Tommy Puente (offgoing nurse). Report included the following information SBAR, Kardex, ED Summary, Procedure Summary, Intake/Output, MAR, Recent Results and Cardiac Rhythm nsr.     0750 Dr Tanya Garcia at bedside    0830 pt given coumadin booklet, instructed on self-injection    1105 order noted for dc, appt with Rachel Blunt 1100 tomorrow.      [address] will perform dc

## 2018-07-05 NOTE — PROGRESS NOTES
Patient had a positive Heb B surface AB and that is good. She also did not have TB. Unfortunately, she developed a PE and is being discharged from hospital.   She needs her form to be completed.

## 2018-07-06 ENCOUNTER — OFFICE VISIT (OUTPATIENT)
Dept: FAMILY MEDICINE CLINIC | Age: 28
End: 2018-07-06

## 2018-07-06 VITALS
BODY MASS INDEX: 32.78 KG/M2 | SYSTOLIC BLOOD PRESSURE: 110 MMHG | HEART RATE: 66 BPM | DIASTOLIC BLOOD PRESSURE: 68 MMHG | WEIGHT: 185 LBS | OXYGEN SATURATION: 98 % | HEIGHT: 63 IN | RESPIRATION RATE: 18 BRPM | TEMPERATURE: 98 F

## 2018-07-06 DIAGNOSIS — E06.3 HYPOTHYROIDISM DUE TO HASHIMOTO'S THYROIDITIS: ICD-10-CM

## 2018-07-06 DIAGNOSIS — Z79.01 ANTICOAGULATION MONITORING, INR RANGE 2-3: ICD-10-CM

## 2018-07-06 DIAGNOSIS — I26.99 OTHER ACUTE PULMONARY EMBOLISM WITHOUT ACUTE COR PULMONALE (HCC): Primary | ICD-10-CM

## 2018-07-06 DIAGNOSIS — Z09 HOSPITAL DISCHARGE FOLLOW-UP: ICD-10-CM

## 2018-07-06 DIAGNOSIS — E03.8 HYPOTHYROIDISM DUE TO HASHIMOTO'S THYROIDITIS: ICD-10-CM

## 2018-07-06 LAB
APO A-I SERPL-MCNC: 136 MG/DL (ref 116–209)
AT III AG PPP IA-ACNC: 65 % (ref 72–124)
AT III PPP CHRO-ACNC: 93 % (ref 75–135)
INR BLD: 1
PROT C PPP-ACNC: 131 % (ref 73–180)
PROT S ACT/NOR PPP: 61 % (ref 63–140)
PT POC: 11.8 SECONDS
VALID INTERNAL CONTROL?: YES

## 2018-07-06 RX ORDER — WARFARIN 1 MG/1
1 TABLET ORAL DAILY
Qty: 30 TAB | Refills: 2 | Status: SHIPPED | OUTPATIENT
Start: 2018-07-06 | End: 2018-07-13

## 2018-07-06 NOTE — PROGRESS NOTES
Subjective:      Brigido Stewart is a 24 y.o. female here today for transitional care visit and anticoagulation monitoring. Admitted to [address] [place] [address] 7/3/18 - 7/5/18 for pulmonary embolism (located in both right upper and lower lobes, left upper and lower lobes and the right main pulmonary artery). Hypercoagulable workup up started and some results are still pending. She was started on Lovenox 1 mg/kg BID and Warfarin -- currently taking 5 mg daily (2.5 mg tab x 2). She reports compliance with Lovenox and Warfarin therapies. She reports that she was instructed on which foods interact with Warfarin therapy. OCP has been discontinued. She has Hematology appointment scheduled for 7/20/18 but states that she will need to reschedule as she has an exam on that date. She endorses mild chest discomfort and dyspnea. TSH during admission elevated at 26.3. Levothyroxine increased to 137.5 mg daily which she has been taking. Recommendation made for repeat TSH testing in 6-8 weeks.        Lab Results   Component Value Date/Time    INR 0.9 07/05/2018 12:47 AM    INR 1.0 07/04/2018 01:14 AM    INR POC 1.0 07/06/2018 11:28 AM    Prothrombin time 9.5 07/05/2018 12:47 AM    Prothrombin time 9.8 07/04/2018 01:14 AM         Current Outpatient Prescriptions   Medication Sig Dispense Refill    levothyroxine (SYNTHROID) 137 mcg tablet Take 137.5 mcg by mouth Daily (before breakfast). 30 Tab 0    enoxaparin (LOVENOX) 80 mg/0.8 mL injection 80 mg by SubCUTAneous route every twelve (12) hours for 14 days. 22.4 mL 0    warfarin (COUMADIN) 2.5 mg tablet Take 2 Tabs by mouth daily. Follow up with PCP for regular labs, they will adjust your warfarin as needed. 28 Tab 0    citalopram (CELEXA) 40 mg tablet Take 1 Tab by mouth once over twenty-four (24) hours for 180 days.  90 Tab 1       Allergies   Allergen Reactions    Ancef [Cefazolin] Hives     Given at c/s, swelling of face/hives, tx'd with benadryl    Pcn [Penicillins] Hives     Facial edema     Peanut Hives    Chocolate [Cocoa] Hives    Citrus And Derivatives Nausea and Vomiting     Oranges only    [place] Hives    Beef Containing Products Hives    Chicken Derived Hives    Corn Hives    Egg Hives    Milk Hives    Milk Prot-Turm-Pepper-Pineappl Hives    Shellfish Derived Hives    Soy Hives    Strawberry Hives       Past Medical History:   Diagnosis Date    Environmental allergies     GERD (gastroesophageal reflux disease)     Hashimoto's thyroiditis 10/24/2011    Hashimoto's thyroiditis 10/24/2011    Hypotension     Hypothyroid 2011    Hypothyroidism     Psychiatric disorder     depression --2 yr old daughter  2013    Unspecified adverse effect of anesthesia     per patient with epidural had severe N&V and passed out       Social History   Substance Use Topics    Smoking status: Former Smoker    Smokeless tobacco: Never Used    Alcohol use No        Review of Systems  Constitutional: negative for fevers and chills  Respiratory: negative for cough, sputum   Cardiovascular: negative for palpitations, irregular heart beats, lower extremity edema  Gastrointestinal: negative for nausea, vomiting, change in bowel habits and abdominal pain    Objective:     Visit Vitals    /68 (BP 1 Location: Right arm, BP Patient Position: Sitting)  Comment: Manual    Pulse 66    Temp 98 °F (36.7 °C) (Oral)  Comment: .     Resp 18    Ht 5' 3\" (1.6 m)    Wt 185 lb (83.9 kg)    LMP 2018 (Approximate)    SpO2 98%    BMI 32.77 kg/m2      General appearance - alert, well appearing, and in no distress  Eyes - pupils equal and reactive, extraocular eye movements intact, sclera anicteric  Oropharyngx - mucous membranes moist, pharynx normal without lesions  Neck - supple, no significant adenopathy  Chest - clear to auscultation, no wheezes, rales or rhonchi, symmetric air entry, no tachypnea, retractions or cyanosis  Heart - normal rate, regular rhythm, normal S1, S2, no murmurs, rubs, clicks or gallops  Neurological - alert, oriented, normal speech, no focal findings or movement disorder noted  Extremities - peripheral pulses normal, no pedal edema, no clubbing or cyanosis    Recent Results (from the past 12 hour(s))   AMB POC PT/INR    Collection Time: 07/06/18 11:28 AM   Result Value Ref Range    VALID INTERNAL CONTROL POC Yes     Prothrombin time (POC) 11.8 seconds    INR POC 1.0        Assessment/Plan:   Namrata Tobar is a 24 y.o. female seen for:     1. Other acute pulmonary embolism without acute cor pulmonale ([facility] [address]): goal INR range is 2-3. Continue with Lovenox 80 mg BID and increase Warfarin to 6 mg daily. Return in 5 days for INR check. - AMB POC PT/INR  - warfarin (COUMADIN) 1 mg tablet; Take 1 Tab by mouth daily. Dispense: 30 Tab; Refill: 2  - Hypercoagulable workup with some results still pending. See Hematology as recommended -- advised to call today if appointment date needs to be changed. 2. Anticoagulation monitoring, INR range 2-3    3. Hypothyroidism due to Hashimoto's thyroiditis: continue with levothyroxine 137 mg daily. Will need repeat TSH in 6-8 weeks (on/after 8/15/18). 4. Hospital discharge follow-up    I have discussed the diagnosis with the patient and the intended plan as seen in the above orders. The patient has received an after-visit summary and questions were answered concerning future plans. I have discussed medication side effects and warnings with the patient as well. Patient verbalizes understanding of plan of care and denies further questions or concerns at this time. Informed patient to return to the office if symptoms worsen or if new symptoms arise. Follow-up Disposition:  Return in about 5 days (around 7/11/2018) for anticoagulation monitoring.

## 2018-07-06 NOTE — PROGRESS NOTES
Identified pt with two pt identifiers(name and ).     Chief Complaint   Patient presents with   [facility] Follow Up     Multiple PE's        Health Maintenance Due   Topic    HPV Age 9Y-34Y (1 of 3 - Female 3 Dose Series)    PAP AKA CERVICAL CYTOLOGY        Wt Readings from Last 3 Encounters:   18 185 lb (83.9 kg)   18 186 lb (84.4 kg)   18 184 lb (83.5 kg)     Temp Readings from Last 3 Encounters:   18 98.1 °F (36.7 °C)   18 97.4 °F (36.3 °C) (Oral)     BP Readings from Last 3 Encounters:   18 108/77   18 108/74   18 110/75     Pulse Readings from Last 3 Encounters:   18 66   18 88   18 94         Learning Assessment:  :     Learning Assessment 2015 2014 3/21/2014   PRIMARY LEARNER Patient Patient Patient   HIGHEST LEVEL OF EDUCATION - PRIMARY LEARNER  GRADUATED HIGH SCHOOL OR GED GRADUATED HIGH SCHOOL OR GED GRADUATED HIGH SCHOOL OR GED   BARRIERS PRIMARY LEARNER NONE NONE NONE   CO-LEARNER CAREGIVER No No -   PRIMARY LANGUAGE ENGLISH ENGLISH ENGLISH   LEARNER PREFERENCE PRIMARY READING DEMONSTRATION DEMONSTRATION   ANSWERED BY Patient patient patient   RELATIONSHIP SELF SELF SELF       Depression Screening:  :     PHQ over the last two weeks 3/6/2018   Little interest or pleasure in doing things Not at all   Feeling down, depressed or hopeless Not at all   Total Score PHQ 2 0   Trouble falling or staying asleep, or sleeping too much -   Feeling tired or having little energy -   Poor appetite or overeating -   Feeling bad about yourself - or that you are a failure or have let yourself or your family down -   Trouble concentrating on things such as school, work, reading or watching TV -   Moving or speaking so slowly that other people could have noticed; or the opposite being so fidgety that others notice -   Thoughts of being better off dead, or hurting yourself in some way -   PHQ 9 Score -   How difficult have these problems made it for you to do your work, take care of your home and get along with others -       Fall Risk Assessment:  :     No flowsheet data found. Abuse Screening:  :     Abuse Screening Questionnaire 6/29/2018 10/27/2016 5/22/2014   Do you ever feel afraid of your partner? N N N   Are you in a relationship with someone who physically or mentally threatens you? N N N   Is it safe for you to go home? [place]       Coordination of Care Questionnaire:  :     1) Have you been to an emergency room, urgent care clinic since your last visit? Yes [facility] for PE's  Hospitalized since your last visit? yes             2) Have you seen or consulted any other health care providers outside of [facility] since your last visit? no  (Include any pap smears or colon screenings in this section.)    3) Do you have an Advance Directive on file? no  Are you interested in receiving information about Advance Directives? no    Reviewed record in preparation for visit and have obtained necessary documentation. Medication reconciliation up to date and corrected with patient at this time.

## 2018-07-06 NOTE — MR AVS SNAPSHOT
[address] [address] [address] [facility] 
[phone] Patient: Aj Espino MRN: [id number] :[id number] Visit Information Date & Time Provider Department Dept. Phone Encounter #  
 2018 11:15 AM Toby Pandey  [address] [phone] [id number] Follow-up Instructions Return in about 5 days (around 2018) for anticoagulation monitoring. Your Appointments 2018  8:00 AM  
Office Visit with Brandon Noble MD  
[facility] at [facility] [address]) Appt Note: new pt, Dx PE, Ref by DEEPAK HERBERT ([facility]) [address] [facility], [address] [place] [id number]  
[phone]  
  
   
 [address] [facility], [address] Upcoming Health Maintenance Date Due  
 HPV Age 9Y-34Y (1 of 3 - Female 3 Dose Series) 10/4/2003 PAP AKA CERVICAL CYTOLOGY 2018 Influenza Age 5 to Adult 2018 Pneumococcal 19-64 Highest Risk (2 of 3 - PCV13) 3/6/2019 DTaP/Tdap/Td series (2 - Td) 10/12/2025 Allergies as of 2018  Review Complete On: 2018 By: Toby Pandey MD  
  
 Severity Noted Reaction Type Reactions Ancef [Cefazolin] High 2015    Hives Given at c/s, swelling of face/hives, tx'd with benadryl Pcn [Penicillins] High 2011   Systemic Hives Facial edema Peanut Medium 10/13/2014   Systemic Hives Chocolate [Cocoa]  2015    Hives Citrus And Derivatives  2015    Nausea and Vomiting Oranges only [place] Low 10/13/2014   Systemic Hives Beef Containing Products Low 10/13/2014   Systemic Hives Chicken Derived Low 10/13/2014   Systemic Hives [place] Low 10/13/2014   Systemic Hives Egg Low 10/13/2014   Systemic Hives Milk Low 10/13/2014   Systemic Hives Milk Prot-turm-pepper-pineappl Low 10/13/2014   Systemic Hives Shellfish Derived Low 10/13/2014   Systemic Hives Soy Low 10/13/2014   Systemic Hives [place] Low 10/13/2014   Systemic Hives Current Immunizations  Reviewed on 6/29/2018 Name Date Tdap 10/12/2015 Not reviewed this visit You Were Diagnosed With   
  
 Codes Comments Other acute pulmonary embolism without acute cor pulmonale (HCC)    -  Primary ICD-10-CM: I26.99 
ICD-9-CM: 415.19 Anticoagulation monitoring, INR range 2-3     ICD-10-CM: Z79.01 
ICD-9-CM: V58.61 Hypothyroidism due to Hashimoto's thyroiditis     ICD-10-CM: E03.8, E06.3 ICD-9-CM: 244.8, 245.2 Hospital discharge follow-up     ICD-10-CM: 593 [facility] ICD-9-CM: V67.59 Vitals BP Pulse Temp Resp Height(growth percentile) Weight(growth percentile) 110/68 (BP 1 Location: Right arm, BP Patient Position: Sitting) 66 98 °F (36.7 °C) (Oral) 18 5' 3\" (1.6 m) 185 lb (83.9 kg) LMP SpO2 BMI OB Status Smoking Status 06/06/2018 (Approximate) 98% 32.77 kg/m2 Having regular periods Former Smoker Vitals History BMI and BSA Data Body Mass Index Body Surface Area 32.77 kg/m 2 1.93 m 2 Preferred Pharmacy Pharmacy Name Phone [address] [phone] Your Updated Medication List  
  
   
This list is accurate as of 7/6/18 11:46 AM.  Always use your most recent med list.  
  
  
  
  
 citalopram 40 mg tablet Commonly known as:  Coffey Busing Take 1 Tab by mouth once over twenty-four (24) hours for 180 days. enoxaparin 80 mg/0.8 mL injection Commonly known as:  LOVENOX 80 mg by SubCUTAneous route every twelve (12) hours for 14 days. levothyroxine 137 mcg tablet Commonly known as:  SYNTHROID Take 137.5 mcg by mouth Daily (before breakfast). * warfarin 2.5 mg tablet Commonly known as:  COUMADIN Take 2 Tabs by mouth daily. Follow up with PCP for regular labs, they will adjust your warfarin as needed. * warfarin 1 mg tablet Commonly known as:  COUMADIN Take 1 Tab by mouth daily. * Notice: This list has 2 medication(s) that are the same as other medications prescribed for you. Read the directions carefully, and ask your doctor or other care provider to review them with you. Prescriptions Sent to Pharmacy Refills  
 warfarin (COUMADIN) 1 mg tablet 2 Sig: Take 1 Tab by mouth daily. Class: Normal  
 Pharmacy: [facility] [address] GIACOMO NAVA [address] [place] [address] #: [phone] Route: Oral  
  
July 2018 Details Sun Mon Tue Wed Thu Fri Sat  
  1  
  
  
  
   2  
  
  
  
   3  
  
  
  
   4  
  
  
  
   5  
  
  
  
   6  
  
6 mg See details 7  
  
6 mg  
  
  
  8  
  
6 mg  
  
   9  
  
6 mg  
  
   10  
  
6 mg  
  
   11  
  
6 mg  
  
   12  
  
  
  
   13  
  
  
  
   14  
  
  
  
  
  15  
  
  
  
   16  
  
  
  
   17  
  
  
  
   18  
  
  
  
   19  
  
  
  
   20  
  
  
  
   21  
  
  
  
  
  22  
  
  
  
   23  
  
  
  
   24  
  
  
  
   25  
  
  
  
   26  
  
  
  
   27  
  
  
  
   28  
  
  
  
  
  29  
  
  
  
   30  
  
  
  
   31  
  
  
  
      
 Date Details 07/06 This INR check INR: 1.0! Date of next INR:  7/11/2018 How to take your warfarin dose To take:  6 mg Take two of the 2.5 mg tablets and one of the 1 mg tablets. We Performed the Following AMB POC PT/INR [12912 CPT(R)] Follow-up Instructions Return in about 5 days (around 7/11/2018) for anticoagulation monitoring. Patient Instructions Anticoagulants: After Your Visit Your Care Instructions Your doctor prescribed an anticoagulant medicine. Anticoagulants, often called blood thinners, prevent new blood clots from forming and keep existing clots from getting larger. They do not actually thin the blood, but they make the blood take longer to clot.  This lowers the risk of a blood clot moving to the lungs (pulmonary embolism) or moving to the brain and causing a stroke. Blood thinners come in two forms. Heparin is given by shot, either under your skin or through a needle in your vein, and starts working right away. Warfarin (Coumadin) comes in pill form and takes longer to work. Your doctor may have you begin taking both forms at the same time. As soon as the pills start to work, you will stop the shots but continue to take the pills. If you have a blood clot in your leg, you may need to take warfarin for several months. People who have heart conditions such as atrial fibrillation often need to take it for the rest of their lives. The right dose of this medicine is different for each person. You will need regular blood tests to see if your dose is correct. Follow-up care is a key part of your treatment and safety. Be sure to make and go to all appointments, and call your doctor if you are having problems. Itâs also a good idea to know your test results and keep a list of the medicines you take. How do you take blood thinners? · Take your medicines exactly as prescribed. Call your doctor if you think you are having a problem with your medicine. · Call your doctor if you are not sure what to do if you missed a dose of blood thinner. ¨ Your doctor can tell you exactly what to do so you do not take too much or too little blood thinner. Then you will be as safe as possible. · Some general rules for what to do if you miss a dose: ¨ If you remember it in the same day, take the missed dose. Then go back to your regular schedule. ¨ If it is the next day, or almost time to take the next dose, do not take the missed dose. Do not double the dose to make up for the missed one. At your next regularly scheduled time, take your normal dose. ¨ If you miss your dose for 2 or more days, call your doctor.  
· To help you stay on schedule, use a calendar to remind you when to take your blood thinner. When you take the medicine, note it on the calendar. · If you are going to give yourself shots, your doctor will give you instructions for how to safely inject the medicine. Follow the directions carefully. · Do not take any vitamins, over-the-counter medicines, or herbal products without talking to your doctor first. 
· Avoid contact sports and other activities that could lead to injury. Make your home safe and take other measures to reduce your risk of falling. Always wear a seat belt while in a car. · Do not suddenly change your intake of vitamin Kârich foods, such as broccoli, cabbage, asparagus, lettuce, and spinach. This will help blood thinners work evenly from day to day. · Limit alcohol to 2 drinks a day for men and 1 drink a day for women. Alcohol may interfere with blood thinners. It also increases your risk of falls, which can cause bruising and bleeding. · Tell your dentist, pharmacist, and other health professionals that you take blood thinners. Wear medical alert jewelry that says you take blood thinners. You can buy this at most [employer]. When should you call for help? Call 911 anytime you think you may need emergency care. For example, call if: 
· You cough up blood. · You vomit blood or what looks like coffee grounds. · You pass maroon or very bloody stools. Call your doctor now or seek immediate medical care if: 
· You have new bruises or blood spots under your skin. · You have a nosebleed. · Your gums bleed when you brush your teeth. · You have blood in your urine. · Your stools are black and tarlike or have streaks of blood. · You have vaginal bleeding when you are not having your period, or heavy period bleeding. Watch closely for changes in your health, and be sure to contact your doctor if: 
· You have questions about your medicine. Where can you learn more? Go to DealExplorer.be Enter R270 in the search box to learn more about \"Anticoagulants: After Your Visit. \" Â© 4389-2194 Healthwise, Incorporated. Care instructions adapted under license by Dino Russell (which disclaims liability or warranty for this information). This care instruction is for use with your licensed healthcare professional. If you have questions about a medical condition or this instruction, always ask your healthcare professional. [address] any warranty or liability for your use of this information. Content Version: 7.8.98151; Last Revised: July 1, 2009 Introducing [facility]! Dear Nathaniel Inman: Thank you for requesting a [email or web address]. account. Our records indicate that you already have an active [email or web address] account. You can access your account anytime at [email or web address] Did you know that you can access your hospital and ER discharge instructions at any time in [email or web address].? You can also review all of your test results from your hospital stay or ER visit. Additional Information If you have questions, please visit the Frequently Asked Questions section of the [email or web address]. website at [email or web address] Remember, [email or web address]. is NOT to be used for urgent needs. For medical emergencies, dial 911. Now available from your iPhone and Android! Please provide this summary of care documentation to your next provider. Your primary care clinician is listed as [address]. If you have any questions after today's visit, please call [phone].

## 2018-07-06 NOTE — PATIENT INSTRUCTIONS
Anticoagulants: After Your Visit  Your Care Instructions  Your doctor prescribed an anticoagulant medicine. Anticoagulants, often called blood thinners, prevent new blood clots from forming and keep existing clots from getting larger. They do not actually thin the blood, but they make the blood take longer to clot. This lowers the risk of a blood clot moving to the lungs (pulmonary embolism) or moving to the brain and causing a stroke. Blood thinners come in two forms. Heparin is given by shot, either under your skin or through a needle in your vein, and starts working right away. Warfarin (Coumadin) comes in pill form and takes longer to work. Your doctor may have you begin taking both forms at the same time. As soon as the pills start to work, you will stop the shots but continue to take the pills. If you have a blood clot in your leg, you may need to take warfarin for several months. People who have heart conditions such as atrial fibrillation often need to take it for the rest of their lives. The right dose of this medicine is different for each person. You will need regular blood tests to see if your dose is correct. Follow-up care is a key part of your treatment and safety. Be sure to make and go to all appointments, and call your doctor if you are having problems. Itâs also a good idea to know your test results and keep a list of the medicines you take. How do you take blood thinners? · Take your medicines exactly as prescribed. Call your doctor if you think you are having a problem with your medicine. · Call your doctor if you are not sure what to do if you missed a dose of blood thinner. ¨ Your doctor can tell you exactly what to do so you do not take too much or too little blood thinner. Then you will be as safe as possible. · Some general rules for what to do if you miss a dose:  ¨ If you remember it in the same day, take the missed dose. Then go back to your regular schedule.   ¨ If it is the next day, or almost time to take the next dose, do not take the missed dose. Do not double the dose to make up for the missed one. At your next regularly scheduled time, take your normal dose. ¨ If you miss your dose for 2 or more days, call your doctor. · To help you stay on schedule, use a calendar to remind you when to take your blood thinner. When you take the medicine, note it on the calendar. · If you are going to give yourself shots, your doctor will give you instructions for how to safely inject the medicine. Follow the directions carefully. · Do not take any vitamins, over-the-counter medicines, or herbal products without talking to your doctor first.  · Avoid contact sports and other activities that could lead to injury. Make your home safe and take other measures to reduce your risk of falling. Always wear a seat belt while in a car. · Do not suddenly change your intake of vitamin Kârich foods, such as broccoli, cabbage, asparagus, lettuce, and spinach. This will help blood thinners work evenly from day to day. · Limit alcohol to 2 drinks a day for men and 1 drink a day for women. Alcohol may interfere with blood thinners. It also increases your risk of falls, which can cause bruising and bleeding. · Tell your dentist, pharmacist, and other health professionals that you take blood thinners. Wear medical alert jewelry that says you take blood thinners. You can buy this at most drugstores. When should you call for help? Call 911 anytime you think you may need emergency care. For example, call if:  · You cough up blood. · You vomit blood or what looks like coffee grounds. · You pass maroon or very bloody stools. Call your doctor now or seek immediate medical care if:  · You have new bruises or blood spots under your skin. · You have a nosebleed. · Your gums bleed when you brush your teeth. · You have blood in your urine. · Your stools are black and tarlike or have streaks of blood.   · You have vaginal bleeding when you are not having your period, or heavy period bleeding. Watch closely for changes in your health, and be sure to contact your doctor if:  · You have questions about your medicine. Where can you learn more? Go to Smart Pipe.be  Enter U917 in the search box to learn more about \"Anticoagulants: After Your Visit. \"   Â© 8736-7679 Healthwise, Incorporated. Care instructions adapted under license by [employer] (which disclaims liability or warranty for this information). This care instruction is for use with your licensed healthcare professional. If you have questions about a medical condition or this instruction, always ask your healthcare professional. [address] [address] any warranty or liability for your use of this information.   Content Version: 3.9.15176; Last Revised: July 1, 2009

## 2018-07-09 LAB
F2 GENE MUT ANL BLD/T: NORMAL
F5 GENE MUT ANL BLD/T: NORMAL

## 2018-07-11 ENCOUNTER — APPOINTMENT (OUTPATIENT)
Dept: GENERAL RADIOLOGY | Age: 28
End: 2018-07-11
Attending: EMERGENCY MEDICINE
Payer: MEDICAID

## 2018-07-11 ENCOUNTER — HOSPITAL ENCOUNTER (EMERGENCY)
Age: 28
Discharge: HOME | End: 2018-07-11
Attending: EMERGENCY MEDICINE
Payer: MEDICAID

## 2018-07-11 ENCOUNTER — OFFICE VISIT (OUTPATIENT)
Dept: FAMILY MEDICINE CLINIC | Age: 28
End: 2018-07-11

## 2018-07-11 VITALS
SYSTOLIC BLOOD PRESSURE: 107 MMHG | RESPIRATION RATE: 18 BRPM | OXYGEN SATURATION: 100 % | DIASTOLIC BLOOD PRESSURE: 70 MMHG | HEART RATE: 72 BPM

## 2018-07-11 VITALS
DIASTOLIC BLOOD PRESSURE: 50 MMHG | TEMPERATURE: 97.5 F | BODY MASS INDEX: 32.43 KG/M2 | WEIGHT: 183 LBS | SYSTOLIC BLOOD PRESSURE: 82 MMHG | HEART RATE: 106 BPM | RESPIRATION RATE: 18 BRPM | OXYGEN SATURATION: 98 % | HEIGHT: 63 IN

## 2018-07-11 DIAGNOSIS — R42 DIZZINESS: ICD-10-CM

## 2018-07-11 DIAGNOSIS — R42 ORTHOSTATIC DIZZINESS: Primary | ICD-10-CM

## 2018-07-11 DIAGNOSIS — I26.99 OTHER ACUTE PULMONARY EMBOLISM WITHOUT ACUTE COR PULMONALE (HCC): Primary | ICD-10-CM

## 2018-07-11 DIAGNOSIS — Z51.81 ANTICOAGULATION GOAL OF INR 2 TO 3: ICD-10-CM

## 2018-07-11 DIAGNOSIS — Z79.01 ANTICOAGULATION GOAL OF INR 2 TO 3: ICD-10-CM

## 2018-07-11 LAB
ANION GAP SERPL CALC-SCNC: 11 MMOL/L (ref 5–15)
ATRIAL RATE: 83 BPM
BASOPHILS # BLD: 0.1 K/UL (ref 0–0.1)
BASOPHILS NFR BLD: 1 % (ref 0–1)
BUN SERPL-MCNC: 10 MG/DL (ref 6–20)
BUN/CREAT SERPL: 12 (ref 12–20)
CALCIUM SERPL-MCNC: 9.4 MG/DL (ref 8.5–10.1)
CALCULATED P AXIS, ECG09: 59 DEGREES
CALCULATED R AXIS, ECG10: -7 DEGREES
CALCULATED T AXIS, ECG11: 28 DEGREES
CHLORIDE SERPL-SCNC: 102 MMOL/L (ref 97–108)
CO2 SERPL-SCNC: 25 MMOL/L (ref 21–32)
CREAT SERPL-MCNC: 0.82 MG/DL (ref 0.55–1.02)
DIAGNOSIS, 93000: NORMAL
DIFFERENTIAL METHOD BLD: ABNORMAL
EOSINOPHIL # BLD: 0.1 K/UL (ref 0–0.4)
EOSINOPHIL NFR BLD: 2 % (ref 0–7)
ERYTHROCYTE [DISTWIDTH] IN BLOOD BY AUTOMATED COUNT: 12.8 % (ref 11.5–14.5)
GLUCOSE SERPL-MCNC: 93 MG/DL (ref 65–100)
HCT VFR BLD AUTO: 36.9 % (ref 35–47)
HGB BLD-MCNC: 11.8 G/DL (ref 11.5–16)
IMM GRANULOCYTES # BLD: 0 K/UL (ref 0–0.04)
IMM GRANULOCYTES NFR BLD AUTO: 0 % (ref 0–0.5)
INR BLD: 2.4
INR PPP: 2 (ref 0.9–1.1)
LYMPHOCYTES # BLD: 2.3 K/UL (ref 0.8–3.5)
LYMPHOCYTES NFR BLD: 36 % (ref 12–49)
MCH RBC QN AUTO: 26.2 PG (ref 26–34)
MCHC RBC AUTO-ENTMCNC: 32 G/DL (ref 30–36.5)
MCV RBC AUTO: 81.8 FL (ref 80–99)
MONOCYTES # BLD: 0.5 K/UL (ref 0–1)
MONOCYTES NFR BLD: 8 % (ref 5–13)
NEUTS SEG # BLD: 3.5 K/UL (ref 1.8–8)
NEUTS SEG NFR BLD: 53 % (ref 32–75)
NRBC # BLD: 0 K/UL (ref 0–0.01)
NRBC BLD-RTO: 0 PER 100 WBC
P-R INTERVAL, ECG05: 154 MS
PLATELET # BLD AUTO: 438 K/UL (ref 150–400)
PMV BLD AUTO: 9.3 FL (ref 8.9–12.9)
POTASSIUM SERPL-SCNC: 3.9 MMOL/L (ref 3.5–5.1)
PROTHROMBIN TIME: 20.6 SEC (ref 9–11.1)
PT POC: 28.9 SECONDS
Q-T INTERVAL, ECG07: 382 MS
QRS DURATION, ECG06: 88 MS
QTC CALCULATION (BEZET), ECG08: 448 MS
RBC # BLD AUTO: 4.51 M/UL (ref 3.8–5.2)
SODIUM SERPL-SCNC: 138 MMOL/L (ref 136–145)
VALID INTERNAL CONTROL?: YES
VENTRICULAR RATE, ECG03: 83 BPM
WBC # BLD AUTO: 6.5 K/UL (ref 3.6–11)

## 2018-07-11 PROCEDURE — 99283 EMERGENCY DEPT VISIT LOW MDM: CPT

## 2018-07-11 PROCEDURE — 71046 X-RAY EXAM CHEST 2 VIEWS: CPT

## 2018-07-11 PROCEDURE — 85610 PROTHROMBIN TIME: CPT | Performed by: EMERGENCY MEDICINE

## 2018-07-11 PROCEDURE — 85025 COMPLETE CBC W/AUTO DIFF WBC: CPT | Performed by: EMERGENCY MEDICINE

## 2018-07-11 PROCEDURE — 36415 COLL VENOUS BLD VENIPUNCTURE: CPT | Performed by: EMERGENCY MEDICINE

## 2018-07-11 PROCEDURE — 74011250636 HC RX REV CODE- 250/636: Performed by: EMERGENCY MEDICINE

## 2018-07-11 PROCEDURE — 93005 ELECTROCARDIOGRAM TRACING: CPT

## 2018-07-11 PROCEDURE — 80048 BASIC METABOLIC PNL TOTAL CA: CPT | Performed by: EMERGENCY MEDICINE

## 2018-07-11 PROCEDURE — 96361 HYDRATE IV INFUSION ADD-ON: CPT

## 2018-07-11 PROCEDURE — 96360 HYDRATION IV INFUSION INIT: CPT

## 2018-07-11 RX ADMIN — SODIUM CHLORIDE 1000 ML: 900 INJECTION, SOLUTION INTRAVENOUS at 13:40

## 2018-07-11 RX ADMIN — SODIUM CHLORIDE 1000 ML: 900 INJECTION, SOLUTION INTRAVENOUS at 15:14

## 2018-07-11 NOTE — ED TRIAGE NOTES
Pt presents from PCP after having INR (2.1) checked, pt currently on coumadin and Lovenox for bilateral pulmonary embolisms . Pt reports increasing shortness of breath with exertion and dizziness with position changes, found to be orthostatic at PCP's office.

## 2018-07-11 NOTE — ED PROVIDER NOTES
HPI Comments: 24 y.o. female with past medical history significant for PE, hypothyroidism, GERD, and hypotension who presents ambulatory from PCP's office to the ED with cc of SOB. Pt reports she was admitted on 7/3/18 for BL PEs, for which she takes Warfarin and Lovenox. She states she was sent from her PCP's office after an INR check today for evaluation of symptoms; per pt, her INR was 2.1, and her PCP recommended ceasing Lovenox if it remains at that level x 48 hours. Pt endorses new orthostatic SOB and dizziness x \"a few days\" with associated mild nausea. She denies any SOB at rest unless she is speaking. She notes she did not have SOB during diagnosis or admission, and this is a new symptom. Pt specifically denies any vomiting or change in appetite/intake. There are no other acute medical concerns at this time. Social Hx: former Tobacco use, denies EtOH use, denies Illicit Drug use  PCP: Hyacinth Hammans, MD    Note written by Singh Ivy, as dictated by Suki Green MD 1:25 PM      The history is provided by the patient. No  was used.         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, congenital anomaly    HX  SECTION  2016         Family History:   Problem Relation Age of Onset    Heart Disease Mother      miltral value prolapse    No Known Problems Father     Thyroid Disease Maternal Grandmother     Diabetes Paternal Grandmother     Diabetes Maternal Aunt     Cancer Paternal Grandfather      throat/stomach cancer,  at age 72    Breast Cancer Other      maternal great grandmother       Social History     Social History    Marital status: LEGALLY      Spouse name: N/A    Number of children: N/A    Years of education: N/A     Occupational History    Not on file. Social History Main Topics    Smoking status: Former Smoker    Smokeless tobacco: Never Used    Alcohol use No    Drug use: No    Sexual activity: Yes     Partners: Male     Birth control/ protection: Condom     Other Topics Concern    Not on file     Social History Narrative         ALLERGIES: Ancef [cefazolin]; Pcn [penicillins]; Peanut; Chocolate [cocoa]; Citrus and derivatives; [place]; Beef containing products; Chicken derived; Corn; Egg; Milk; Milk prot-turm-pepper-pineappl; Shellfish derived; Soy; and [place]    Review of Systems   Constitutional: Negative for chills and fever. Respiratory: Positive for shortness of breath. Cardiovascular: Negative for chest pain. Gastrointestinal: Positive for nausea. Negative for abdominal pain, constipation, diarrhea and vomiting. Neurological: Positive for dizziness. Negative for light-headedness. All other systems reviewed and are negative. Vitals:    18 1325   BP: 105/69   Pulse: 64   Resp: 19   SpO2: 98%            Physical Exam   Constitutional: She is oriented to person, place, and time. She appears well-developed. No distress. HENT:   Head: Normocephalic and atraumatic. Eyes: Pupils are equal, round, and reactive to light. No scleral icterus. Neck: Normal range of motion. Neck supple. Cardiovascular: Normal rate and regular rhythm. symptomatically orthostatic   Pulmonary/Chest: Effort normal and breath sounds normal.   Abdominal: Soft. She exhibits no distension. There is no tenderness. There is no rebound and no guarding. Musculoskeletal: Normal range of motion.    Neurological: She is alert and oriented to person, place, and time. She has normal strength. No cranial nerve deficit or sensory deficit. Gait normal.   Skin: Skin is warm and dry. She is not diaphoretic. Psychiatric: She has a normal mood and affect. Her behavior is normal. Thought content normal.   Nursing note and vitals reviewed. Note written by Singh Marques, as dictated by Mari Hillman MD 1:25 PM      MDM  Number of Diagnoses or Management Options  Orthostatic dizziness: new and requires workup  Diagnosis management comments: Patient presents with orthostatic dizziness improved with IV fluids. The patient is resting comfortably and feels better, is alert, talkative, interactive and in no distress. The repeat examination is unremarkable and benign. The patient is neurologically intact, has a normal mental status and is ambulatory in the ED. The history, exam, diagnostic testing (if any) and the patient's current condition do not suggest arrhythmia, STEMI, seizure, meningitis, stroke, sepsis, subarachnoid hemorrhage, intracranial bleeding, encephalitis or other significant pathology that would warrant further testing, continued ED treatment, admission, neurological consultation, or other specialist evaluation at this point. The vital signs have been stable. The patient's condition is stable and appropriate for discharge. The patient will pursue further outpatient evaluation with the primary care physician or other designated or consulting physician as indicated in the discharge instructions. ED Course       Procedures    ED EKG interpretation:  NSR, rate 83, no ectopy, no ST/T wave changes. Note written by Singh Marques, as dictated by Mari Hillman MD 1:29 PM    2:43 PM  Pt had 800 of her Liter, has had some improvement, still has some symptoms when standing, so will give her more fluid.     4:49 PM  Spoke to pt and her mother; pt finished liter, is feeling better, able to walk around without lightheadedness or dizziness, SOB is now at baseline since PE dx. They will follow up with PCP and hematology/oncology. 4:50 PM  Patient's results have been reviewed with them. Patient and/or family have verbally conveyed their understanding and agreement of the patient's signs, symptoms, diagnosis, treatment and prognosis and additionally agree to follow up as recommended or return to the Emergency Room should their condition change prior to follow-up. Discharge instructions have also been provided to the patient with some educational information regarding their diagnosis as well a list of reasons why they would want to return to the ER prior to their follow-up appointment should their condition change.

## 2018-07-11 NOTE — PROGRESS NOTES
Subjective:      John Ramirez is a 24 y.o. female here for anticoagulation monitoring. She reports increasing dyspnea which is worse with standing/exertion, chest pain (located in the mid anterior chest and described as a tightness), left chest wall pain just underneath the breast, nonproductive cough. States that symptoms have been gradually worsening since her discharge from the hospital. No syncope reported. Anticoagulation Information:   · Indication: Pulmonary embolism   · INR Goal: 2-3   · Current dose: Coumadin 6 mg daily + Lovenox 80 mg twice daily   · Missed Coumadin Doses: None  · Medication Changes: no  · Dietary Changes: no     · Symptoms: taking coumadin appropriately without any bleeding. Denies gum bleeding, epistaxis, hematemesis, melena, hematochezia, hematuria, easy bruising. She has rescheduled Hematology appointment with Dr. Sylvia Metcalf for 7/31/18. Results of hypercoagulable work up notable for decreased functional Protein S and low antithrombin antigen. Current Outpatient Prescriptions   Medication Sig Dispense Refill    warfarin (COUMADIN) 1 mg tablet Take 1 Tab by mouth daily. 30 Tab 2    levothyroxine (SYNTHROID) 137 mcg tablet Take 137.5 mcg by mouth Daily (before breakfast). 30 Tab 0    enoxaparin (LOVENOX) 80 mg/0.8 mL injection 80 mg by SubCUTAneous route every twelve (12) hours for 14 days. 22.4 mL 0    warfarin (COUMADIN) 2.5 mg tablet Take 2 Tabs by mouth daily. Follow up with PCP for regular labs, they will adjust your warfarin as needed. 28 Tab 0    citalopram (CELEXA) 40 mg tablet Take 1 Tab by mouth once over twenty-four (24) hours for 180 days.  90 Tab 1       Allergies   Allergen Reactions    Ancef [Cefazolin] Hives     Given at c/s, swelling of face/hives, tx'd with benadryl    Pcn [Penicillins] Hives     Facial edema     Peanut Hives    Chocolate [Cocoa] Hives    Citrus And Derivatives Nausea and Vomiting     Oranges only    [place] Hives    Beef Containing Products Hives    Chicken Derived Hives    Corn Hives    Egg Hives    Milk Hives    Milk Prot-Turm-Pepper-Pineappl Hives    Shellfish Derived Hives    Soy Hives    Strawberry Hives       Past Medical History:   Diagnosis Date    Environmental allergies     GERD (gastroesophageal reflux disease)     Hashimoto's thyroiditis 10/24/2011    Hashimoto's thyroiditis 10/24/2011    Hypotension     Hypothyroid 2011    Hypothyroidism     Psychiatric disorder     depression --2 yr old daughter  2013    Unspecified adverse effect of anesthesia     per patient with epidural had severe N&V and passed out       Social History   Substance Use Topics    Smoking status: Former Smoker    Smokeless tobacco: Never Used    Alcohol use No        Review of Systems  Pertinent items are noted in HPI.      Objective:     Vitals:    18 1150 18 1207 18 1208 18 1209   BP: 90/62  Comment: . 92/60 94/62 (!) 82/50   BP 1 Location: Right arm Right arm Left arm Left arm   BP Patient Position: Sitting Supine Sitting Standing   Pulse: 92 80 87 (!) 106   Resp: 18      Temp: 97.5 °F (36.4 °C)  Comment: Manual      TempSrc: Oral      SpO2: 98%      Weight: 183 lb (83 kg)      Height: 5' 3\" (1.6 m)             General appearance - alert, appears uncomfortable sitting in chair rubbing her chest, and in no distress  Eyes - pupils equal and reactive, extraocular eye movements intact, sclera anicteric  Oropharyngx - mucous membranes moist, pharynx normal without lesions  Chest - clear to auscultation, no wheezes, rales or rhonchi, symmetric air entry, no tachypnea, retractions or cyanosis  Heart - normal rate, regular rhythm, normal S1, S2, no murmurs, rubs, clicks or gallops    Recent Results (from the past 12 hour(s))   AMB POC PT/INR    Collection Time: 18 12:13 PM   Result Value Ref Range    VALID INTERNAL CONTROL POC Yes     Prothrombin time (POC) 28.9 seconds    INR POC 2.4 Assessment/Plan:   Samra Buck is a 24 y.o. female seen for:     1. Other acute pulmonary embolism without acute cor pulmonale [facility]): with worsening symptoms since discharge and her last office visit here. She does look rather uncomfortable at this time which is a change from her visit with me last week. Her INR level is currently within goal range on Coumadin 6 mg and Lovenox 80 mg BID which we will continue at this time. Her BP dose decrease upon standing and she is symptomatic with this, but all other vitals are stable. Given her symptoms, recommend ED evaluation. She is accompanied by a family member who is able to transport her to the ED. If discharged, would like her to follow up in the office on 7/13/18.   - AMB POC PT/INR    2. Anticoagulation goal of INR 2 to 3  - AMB POC PT/INR    3. Dizziness    I have discussed the diagnosis with the patient and the intended plan as seen in the above orders. The patient has received an after-visit summary and questions were answered concerning future plans. I have discussed medication side effects and warnings with the patient as well. Patient verbalizes understanding of plan of care and denies further questions or concerns at this time. Informed patient to return to the office if symptoms worsen or if new symptoms arise.     Follow-up Disposition: Not on File

## 2018-07-11 NOTE — PATIENT INSTRUCTIONS
Dizziness: Care Instructions  Your Care Instructions  Dizziness is the feeling of unsteadiness or fuzziness in your head. It is different than having vertigo, which is a feeling that the room is spinning or that you are moving or falling. It is also different from lightheadedness, which is the feeling that you are about to faint. It can be hard to know what causes dizziness. Some people feel dizzy when they have migraine headaches. Sometimes bouts of flu can make you feel dizzy. Some medical conditions, such as heart problems or high blood pressure, can make you feel dizzy. Many medicines can cause dizziness, including medicines for high blood pressure, pain, or anxiety. If a medicine causes your symptoms, your doctor may recommend that you stop or change the medicine. If it is a problem with your heart, you may need medicine to help your heart work better. If there is no clear reason for your symptoms, your doctor may suggest watching and waiting for a while to see if the dizziness goes away on its own. Follow-up care is a key part of your treatment and safety. Be sure to make and go to all appointments, and call your doctor if you are having problems. It's also a good idea to know your test results and keep a list of the medicines you take. How can you care for yourself at home? · If your doctor recommends or prescribes medicine, take it exactly as directed. Call your doctor if you think you are having a problem with your medicine. · Do not drive while you feel dizzy. · Try to prevent falls. Steps you can take include:  ¨ Using nonskid mats, adding grab bars near the tub, and using night-lights. ¨ Clearing your home so that walkways are free of anything you might trip on. ¨ Letting family and friends know that you have been feeling dizzy. This will help them know how to help you. When should you call for help? Call 911 anytime you think you may need emergency care.  For example, call if:    · You passed out (lost consciousness).     · You have dizziness along with symptoms of a heart attack. These may include:  ¨ Chest pain or pressure, or a strange feeling in the chest.  ¨ Sweating. ¨ Shortness of breath. ¨ Nausea or vomiting. ¨ Pain, pressure, or a strange feeling in the back, neck, jaw, or upper belly or in one or both shoulders or arms. ¨ Lightheadedness or sudden weakness. ¨ A fast or irregular heartbeat.     · You have symptoms of a stroke. These may include:  ¨ Sudden numbness, tingling, weakness, or loss of movement in your face, arm, or leg, especially on only one side of your body. ¨ Sudden vision changes. ¨ Sudden trouble speaking. ¨ Sudden confusion or trouble understanding simple statements. ¨ Sudden problems with walking or balance. ¨ A sudden, severe headache that is different from past headaches.    Call your doctor now or seek immediate medical care if:    · You feel dizzy and have a fever, headache, or ringing in your ears.     · You have new or increased nausea and vomiting.     · Your dizziness does not go away or comes back.    Watch closely for changes in your health, and be sure to contact your doctor if:    · You do not get better as expected. Where can you learn more? Go to http://mamie-sandrita.info/. Enter P666 in the search box to learn more about \"Dizziness: Care Instructions. \"  Current as of: November 20, 2017  Content Version: 11.7  © 2855-5038 [employer]. Care instructions adapted under license by [employer] (which disclaims liability or warranty for this information). If you have questions about a medical condition or this instruction, always ask your healthcare professional. [place] [address] any warranty or liability for your use of this information.

## 2018-07-11 NOTE — PROGRESS NOTES
Reason for Readmission:  SOB Dizzines         RRAT Score and Risk Level:  7        Level of Readmission: 1         Care Conference scheduled:  No        Resources/supports as identified by patient/family: Pt parents Izzy Ng [phone]         Top Challenges facing patient (as identified by patient/family and CM):  Pt SOB,       Finances/Medication cost? Medicaid       Transportation Self, family, aunt   Support system or lack thereof?  adequate     Living arrangements? Lives with family         Self-care/ADLs/Cognition? Self care          Current Advanced Directive/Advance Care Plan: No             Plan for utilizing home health: No              Likelihood of additional readmission:   Low              Transition of Care Plan:    Based on readmission, the patient's previous Plan of Care   has been evaluated and/or modified. The current Transition of Care Plan is:  CM met the pt in ED pt reports to be working, pt states \"I have applied for medicaid and waiting on the letter\" Pt reports to use [facility] for he prescriptions. Pt PCP Yves Feliciano   Pt given Code42 health  No NN  DME none  Care Management Interventions  PCP Verified by CM:  Yes  Mode of Transport at Discharge: Self  Transition of Care Consult (CM Consult): Discharge Planning  Current Support Network: Relative's Home  Confirm Follow Up Transport: Self  Plan discussed with Pt/Family/Caregiver: Yes  Discharge Location  Discharge Placement: Home

## 2018-07-11 NOTE — ED NOTES
Pt reports dizziness from lying to sitting and pt began hyperventilating and had unsteady gait upon standing.

## 2018-07-11 NOTE — ED NOTES
Pt's blood pressure checked in recliner and while standing. Pt better able to tolerate standing position compared to arrival. Pt with steady gait, and no dyspnea noted.

## 2018-07-11 NOTE — PROGRESS NOTES
Identified pt with two pt identifiers(name and ). Chief Complaint   Patient presents with    Rib Pain     Left side. Beganthis morning    Leg Pain     Leg cramping. Left calf. Began a few days ago.  Dizziness     mostly when she stands but has been when stitting to.          Health Maintenance Due   Topic    HPV Age 9Y-34Y (1 of 3 - Female 3 Dose Series)    PAP AKA CERVICAL CYTOLOGY        Wt Readings from Last 3 Encounters:   18 183 lb (83 kg)   18 185 lb (83.9 kg)   18 186 lb (84.4 kg)     Temp Readings from Last 3 Encounters:   18 97.5 °F (36.4 °C) (Oral)   18 98 °F (36.7 °C) (Oral)   18 98.1 °F (36.7 °C)     BP Readings from Last 3 Encounters:   18 (!) 82/50   18 110/68   18 108/77     Pulse Readings from Last 3 Encounters:   18 (!) 106   18 66   18 66         Learning Assessment:  :     Learning Assessment 2015 2014 3/21/2014   PRIMARY LEARNER Patient Patient Patient   HIGHEST LEVEL OF EDUCATION - PRIMARY LEARNER  GRADUATED HIGH SCHOOL OR GED GRADUATED HIGH SCHOOL OR GED GRADUATED HIGH SCHOOL OR GED   BARRIERS PRIMARY LEARNER NONE NONE NONE   CO-LEARNER CAREGIVER No No -   PRIMARY LANGUAGE ENGLISH ENGLISH ENGLISH   LEARNER PREFERENCE PRIMARY READING DEMONSTRATION DEMONSTRATION   ANSWERED BY Patient patient patient   RELATIONSHIP SELF SELF SELF       Depression Screening:  :     PHQ over the last two weeks 3/6/2018   Little interest or pleasure in doing things Not at all   Feeling down, depressed or hopeless Not at all   Total Score PHQ 2 0   Trouble falling or staying asleep, or sleeping too much -   Feeling tired or having little energy -   Poor appetite or overeating -   Feeling bad about yourself - or that you are a failure or have let yourself or your family down -   Trouble concentrating on things such as school, work, reading or watching TV -   Moving or speaking so slowly that other people could have noticed; or the opposite being so fidgety that others notice -   Thoughts of being better off dead, or hurting yourself in some way -   PHQ 9 Score -   How difficult have these problems made it for you to do your work, take care of your home and get along with others -       Fall Risk Assessment:  :     No flowsheet data found. Abuse Screening:  :     Abuse Screening Questionnaire 6/29/2018 10/27/2016 5/22/2014   Do you ever feel afraid of your partner? N N N   Are you in a relationship with someone who physically or mentally threatens you? N N N   Is it safe for you to go home? Y Y Y       Coordination of Care Questionnaire:  :     1) Have you been to an emergency room, urgent care clinic since your last visit? no   Hospitalized since your last visit? no             2) Have you seen or consulted any other health care providers outside of [address] [place] [address] since your last visit? no  (Include any pap smears or colon screenings in this section.)    3) Do you have an Advance Directive on file? no  Are you interested in receiving information about Advance Directives? no    Patient is accompanied by Aunt I have received verbal consent from Debi to discuss any/all medical information while they are present in the room. Reviewed record in preparation for visit and have obtained necessary documentation. Medication reconciliation up to date and corrected with patient at this time.

## 2018-07-11 NOTE — MR AVS SNAPSHOT
[address] [facility] 
[phone] Patient: Nessa Quiles MRN: [id number] :1990 Visit Information Date & Time Provider Department Dept. Phone Encounter #  
 2018 11:30 AM Segundo Lowe [phone] [id number] Your Appointments 2018 11:00 AM  
Office Visit with Yanet Cassidy MD  
[facility] at [address]) Appt Note: new pt, Dx PE, Ref by [facility] ([facility]); new pt, Dx PE, Ref by [facility] ([facility]) [address], [address] [facility] [address] [address]  
[phone]  
  
   
 [address], [address] [facility] Upcoming Health Maintenance Date Due  
 HPV Age 9Y-34Y (1 of 3 - Female 3 Dose Series) 10/4/2003 PAP AKA CERVICAL CYTOLOGY 2018 Influenza Age 5 to Adult 2018 Pneumococcal 19-64 Highest Risk (2 of 3 - PCV13) 3/6/2019 DTaP/Tdap/Td series (2 - Td) 10/12/2025 Allergies as of 2018  Review Complete On: 2018 By: Marlene Goodwin MD  
  
 Severity Noted Reaction Type Reactions Ancef [Cefazolin] High 2015    Hives Given at c/s, swelling of face/hives, tx'd with benadryl Pcn [Penicillins] High 2011   Systemic Hives Facial edema Peanut Medium 10/13/2014   Systemic Hives Chocolate [Cocoa]  2015    Hives Citrus And Derivatives  2015    Nausea and Vomiting Oranges only [place] Low 10/13/2014   Systemic Hives Beef Containing Products Low 10/13/2014   Systemic Hives Chicken Derived Low 10/13/2014   Systemic Hives [place] Low 10/13/2014   Systemic Hives Egg Low 10/13/2014   Systemic Hives Milk Low 10/13/2014   Systemic Hives Milk Prot-turm-pepper-pineappl Low 10/13/2014   Systemic Hives Shellfish Derived Low 10/13/2014   Systemic Hives Soy Low 10/13/2014   Systemic Hives [place] Low 10/13/2014   Systemic Hives Current Immunizations  Reviewed on 6/29/2018 Name Date Tdap 10/12/2015 Not reviewed this visit You Were Diagnosed With   
  
 Codes Comments Other acute pulmonary embolism without acute cor pulmonale (HCC)    -  Primary ICD-10-CM: I26.99 
ICD-9-CM: 415.19 Anticoagulation goal of INR 2 to 3     ICD-10-CM: Z51.81, Z79.01 
ICD-9-CM: V58.83, V58.61 Dizziness     ICD-10-CM: A45 ICD-9-CM: 780.4 Vitals BP Pulse Temp Resp Height(growth percentile) Weight(growth percentile) (!) 82/50 (BP 1 Location: Left arm, BP Patient Position: Standing) (!) 106 97.5 °F (36.4 °C) (Oral) 18 5' 3\" (1.6 m) 183 lb (83 kg) LMP SpO2 Breastfeeding? BMI OB Status Smoking Status 07/09/2018 98% No 32.42 kg/m2 Having regular periods Former Smoker Vitals History BMI and BSA Data Body Mass Index Body Surface Area  
 32.42 kg/m 2 1.92 m 2 Preferred Pharmacy Pharmacy Name Phone [address] [phone] Your Updated Medication List  
  
   
This list is accurate as of 7/11/18 12:21 PM.  Always use your most recent med list.  
  
  
  
  
 citalopram 40 mg tablet Commonly known as:  Christ Inoue Take 1 Tab by mouth once over twenty-four (24) hours for 180 days. enoxaparin 80 mg/0.8 mL injection Commonly known as:  LOVENOX 80 mg by SubCUTAneous route every twelve (12) hours for 14 days. levothyroxine 137 mcg tablet Commonly known as:  SYNTHROID Take 137.5 mcg by mouth Daily (before breakfast). * warfarin 2.5 mg tablet Commonly known as:  COUMADIN Take 2 Tabs by mouth daily. Follow up with PCP for regular labs, they will adjust your warfarin as needed. * warfarin 1 mg tablet Commonly known as:  COUMADIN Take 1 Tab by mouth daily. * Notice:   This list has 2 medication(s) that are the same as other medications prescribed for you. Read the directions carefully, and ask your doctor or other care provider to review them with you. July 2018 Details Sun Mon Tue Wed Thu Fri Sat  
  1  
  
  
  
   2  
  
  
  
   3  
  
  
  
   4  
  
  
  
   5  
  
  
  
   6  
  
  
  
   7  
  
  
  
  
  8  
  
  
  
   9  
  
  
  
   10  
  
  
  
   11  
  
6 mg See details 12  
  
6 mg  
  
   13  
  
6 mg  
  
   14  
  
  
  
  
  15  
  
  
  
   16  
  
  
  
   17  
  
  
  
   18  
  
  
  
   19  
  
  
  
   20  
  
  
  
   21  
  
  
  
  
  22  
  
  
  
   23  
  
  
  
   24  
  
  
  
   25  
  
  
  
   26  
  
  
  
   27  
  
  
  
   28  
  
  
  
  
  29  
  
  
  
   30  
  
  
  
   31  
  
  
  
      
 Date Details 07/11 This INR check INR: 2.4 Date of next INR:  7/13/2018 How to take your warfarin dose To take:  6 mg Take two of the 2.5 mg tablets and one of the 1 mg tablets. We Performed the Following AMB POC PT/INR [43243 CPT(R)] Patient Instructions Dizziness: Care Instructions Your Care Instructions Dizziness is the feeling of unsteadiness or fuzziness in your head. It is different than having vertigo, which is a feeling that the room is spinning or that you are moving or falling. It is also different from lightheadedness, which is the feeling that you are about to faint. It can be hard to know what causes dizziness. Some people feel dizzy when they have migraine headaches. Sometimes bouts of flu can make you feel dizzy. Some medical conditions, such as heart problems or high blood pressure, can make you feel dizzy. Many medicines can cause dizziness, including medicines for high blood pressure, pain, or anxiety. If a medicine causes your symptoms, your doctor may recommend that you stop or change the medicine.  If it is a problem with your heart, you may need medicine to help your heart work better. If there is no clear reason for your symptoms, your doctor may suggest watching and waiting for a while to see if the dizziness goes away on its own. Follow-up care is a key part of your treatment and safety. Be sure to make and go to all appointments, and call your doctor if you are having problems. It's also a good idea to know your test results and keep a list of the medicines you take. How can you care for yourself at home? · If your doctor recommends or prescribes medicine, take it exactly as directed. Call your doctor if you think you are having a problem with your medicine. · Do not drive while you feel dizzy. · Try to prevent falls. Steps you can take include: ¨ Using nonskid mats, adding grab bars near the tub, and using night-lights. ¨ Clearing your home so that walkways are free of anything you might trip on. ¨ Letting family and friends know that you have been feeling dizzy. This will help them know how to help you. When should you call for help? Call 911 anytime you think you may need emergency care. For example, call if: 
  · You passed out (lost consciousness).  
  · You have dizziness along with symptoms of a heart attack. These may include: ¨ Chest pain or pressure, or a strange feeling in the chest. 
¨ Sweating. ¨ Shortness of breath. ¨ Nausea or vomiting. ¨ Pain, pressure, or a strange feeling in the back, neck, jaw, or upper belly or in one or both shoulders or arms. ¨ Lightheadedness or sudden weakness. ¨ A fast or irregular heartbeat.  
  · You have symptoms of a stroke. These may include: 
¨ Sudden numbness, tingling, weakness, or loss of movement in your face, arm, or leg, especially on only one side of your body. ¨ Sudden vision changes. ¨ Sudden trouble speaking. ¨ Sudden confusion or trouble understanding simple statements. ¨ Sudden problems with walking or balance. ¨ A sudden, severe headache that is different from past headaches.  
 Call your doctor now or seek immediate medical care if: 
  · You feel dizzy and have a fever, headache, or ringing in your ears.  
  · You have new or increased nausea and vomiting.  
  · Your dizziness does not go away or comes back.  
 Watch closely for changes in your health, and be sure to contact your doctor if: 
  · You do not get better as expected. Where can you learn more? Go to http://mamie-sandrita.info/. Enter B635 in the search box to learn more about \"Dizziness: Care Instructions. \" Current as of: November 20, 2017 Content Version: 11.7 © 9931-4494 [employer]. Care instructions adapted under license by [employer] (which disclaims liability or warranty for this information). If you have questions about a medical condition or this instruction, always ask your healthcare professional. [place] [address] any warranty or liability for your use of this information. Introducing [facility] [place] [facility]! Dear Rachael Pedersen: Thank you for requesting a [employer] account. Our records indicate that you already have an active Touch[employer] account. You can access your account anytime at [email or web address] Did you know that you can access your hospital and ER discharge instructions at any time in Touch[employer]? You can also review all of your test results from your hospital stay or ER visit. Additional Information If you have questions, please visit the Frequently Asked Questions section of the [employer] website at [email or web address] Remember, Touch[employer] is NOT to be used for urgent needs. For medical emergencies, dial 911. Now available from your iPhone and Android! Please provide this summary of care documentation to your next provider. Your primary care clinician is listed as [address].  If you have any questions after today's visit, please call [phone].

## 2018-07-12 ENCOUNTER — TELEPHONE (OUTPATIENT)
Dept: FAMILY MEDICINE CLINIC | Age: 28
End: 2018-07-12

## 2018-07-12 NOTE — TELEPHONE ENCOUNTER
Patient is calling and ask that Dr. Farhad Rey nurse call her as she has some questions to ask about the discharge papers that she got from the hospital when she was released.   Please call her at [phone]

## 2018-07-12 NOTE — TELEPHONE ENCOUNTER
Patient had a question about previous EKG results. I stated that the ER put in there notes that no changes had been noted and that it was ok. Patient verbalized understanding.

## 2018-07-13 ENCOUNTER — OFFICE VISIT (OUTPATIENT)
Dept: FAMILY MEDICINE CLINIC | Age: 28
End: 2018-07-13

## 2018-07-13 VITALS
DIASTOLIC BLOOD PRESSURE: 72 MMHG | HEART RATE: 87 BPM | OXYGEN SATURATION: 99 % | WEIGHT: 186 LBS | BODY MASS INDEX: 32.96 KG/M2 | HEIGHT: 63 IN | TEMPERATURE: 98.5 F | SYSTOLIC BLOOD PRESSURE: 118 MMHG | RESPIRATION RATE: 18 BRPM

## 2018-07-13 DIAGNOSIS — Z79.01 CURRENT USE OF ANTICOAGULANT THERAPY: ICD-10-CM

## 2018-07-13 DIAGNOSIS — I26.99 OTHER ACUTE PULMONARY EMBOLISM WITHOUT ACUTE COR PULMONALE (HCC): Primary | ICD-10-CM

## 2018-07-13 LAB
INR BLD: 2.6
PT POC: 31 SECONDS
VALID INTERNAL CONTROL?: YES

## 2018-07-13 RX ORDER — WARFARIN 6 MG/1
6 TABLET ORAL DAILY
Qty: 30 TAB | Refills: 2 | Status: SHIPPED | OUTPATIENT
Start: 2018-07-13 | End: 2018-10-18 | Stop reason: SDUPTHER

## 2018-07-13 NOTE — PROGRESS NOTES
Subjective:      Esther Nieto is a 24 y.o. female here for anticoagulation monitoring. Evaluated at [facility] on 7/11/18 for orthostatic dizziness and managed with IVF with improvement in her symptoms. She states that she is has been doing well. Dizziness has resolved. She is eating and drinking well. Anticoagulation Information:   · Indication: Pulmonary embolism   · INR Goal: 2-3   · Current dose: Coumadin 6 mg daily + Lovenox 80 mg twice daily   · Missed Coumadin Doses: None  · Medication Changes: no  · Dietary Changes: no     · Symptoms: taking coumadin appropriately without any bleeding. Denies gum bleeding, epistaxis, hematemesis, melena, hematochezia, hematuria, easy bruising. Current Outpatient Prescriptions   Medication Sig Dispense Refill    warfarin (COUMADIN) 1 mg tablet Take 1 Tab by mouth daily. 30 Tab 2    levothyroxine (SYNTHROID) 137 mcg tablet Take 137.5 mcg by mouth Daily (before breakfast). 30 Tab 0    enoxaparin (LOVENOX) 80 mg/0.8 mL injection 80 mg by SubCUTAneous route every twelve (12) hours for 14 days. 22.4 mL 0    warfarin (COUMADIN) 2.5 mg tablet Take 2 Tabs by mouth daily. Follow up with PCP for regular labs, they will adjust your warfarin as needed. 28 Tab 0    citalopram (CELEXA) 40 mg tablet Take 1 Tab by mouth once over twenty-four (24) hours for 180 days.  90 Tab 1       Allergies   Allergen Reactions    Ancef [Cefazolin] Hives     Given at c/s, swelling of face/hives, tx'd with benadryl    Pcn [Penicillins] Hives     Facial edema     Peanut Hives    Chocolate [Cocoa] Hives    Citrus And Derivatives Nausea and Vomiting     Oranges only    [place] Hives    Beef Containing Products Hives    Chicken Derived Hives    Corn Hives    Egg Hives    Milk Hives    Milk Prot-Turm-Pepper-Pineappl Hives    Shellfish Derived Hives    Soy Hives    Strawberry Hives       Past Medical History:   Diagnosis Date    Environmental allergies     GERD (gastroesophageal reflux disease)     Hashimoto's thyroiditis 10/24/2011    Hashimoto's thyroiditis 10/24/2011    Hypotension     Hypothyroid 2011    Hypothyroidism     Psychiatric disorder     depression --2 yr old daughter  2013    Unspecified adverse effect of anesthesia     per patient with epidural had severe N&V and passed out       Social History   Substance Use Topics    Smoking status: Former Smoker    Smokeless tobacco: Never Used    Alcohol use No        Review of Systems  Pertinent items are noted in HPI. Objective:     Vitals:    18 1415   BP: Comment: Manual   BP 1 Location: Right arm   BP Patient Position: Sitting   Pulse: 87   Resp: 18   Temp: 98.5 °F (36.9 °C)  Comment: . TempSrc: Oral   SpO2: 99%   Weight: 186 lb (84.4 kg)   Height: 5' 3\" (1.6 m)     General appearance - alert, well appearing and in no distress  Eyes - pupils equal and reactive, extraocular eye movements intact, sclera anicteric  Oropharyngx - mucous membranes moist, pharynx normal without lesions  Chest - clear to auscultation, no wheezes, rales or rhonchi, symmetric air entry, no tachypnea, retractions or cyanosis  Heart - normal rate, regular rhythm, normal S1, S2, no murmurs, rubs, clicks or gallops    Recent Results (from the past 12 hour(s))   AMB POC PT/INR    Collection Time: 18  2:28 PM   Result Value Ref Range    VALID INTERNAL CONTROL POC Yes     Prothrombin time (POC) 31.0 seconds    INR POC 2.6        Assessment/Plan:   Stan Radford is a 24 y.o. female seen for:     1. Other acute pulmonary embolism without acute cor pulmonale ([facility] [address]): INR at goal for >48 hours and will discontinue Lovenox at this time. Continue with Coumadin 6 mg daily. Return in 1 week for INR check. - AMB POC PT/INR  - warfarin (COUMADIN) 6 mg tablet; Take 1 Tab by mouth daily. Dispense: 30 Tab; Refill: 2    2.  Current use of anticoagulant therapy  - AMB POC PT/INR    I have discussed the diagnosis with the patient and the intended plan as seen in the above orders. The patient has received an after-visit summary and questions were answered concerning future plans. I have discussed medication side effects and warnings with the patient as well. Patient verbalizes understanding of plan of care and denies further questions or concerns at this time. Informed patient to return to the office if symptoms worsen or if new symptoms arise. Follow-up Disposition:  Return in about 1 week (around 7/20/2018) for INR check or sooner as needed.

## 2018-07-13 NOTE — MR AVS SNAPSHOT
[address] [address] [address] [facility] 
[phone] Patient: Chadwick Eisenmenger MRN: [id number] :1990 Visit Information Date & Time Provider Department Dept. Phone Encounter #  
 2018  2:15 PM Segundo Sands [phone] [id number] Follow-up Instructions Return in about 1 week (around 2018) for INR check or sooner as needed. Your Appointments 2018 11:00 AM  
Office Visit with Ileana Diego MD  
[facility] at [address] [place] [address] [address] [place] [address]) Appt Note: new pt, Dx PE, Ref by DEEPAKTHONG TAYLORLO - HUMACAO ([address] [place] [address]); new pt, Dx PE, Ref by DEEPAK TAYLORLO - HUMACAO ([address] [place] [address]) 301 [facility], [address] [place] [address] [phone]  
[phone]  
  
   
 [address] [place] [address], [address] [place] [address] Upcoming Health Maintenance Date Due  
 HPV Age 9Y-34Y (1 of 3 - Female 3 Dose Series) 10/4/2003 PAP AKA CERVICAL CYTOLOGY 2018 Influenza Age 5 to Adult 2018 Pneumococcal 19-64 Highest Risk (2 of 3 - PCV13) 3/6/2019 DTaP/Tdap/Td series (2 - Td) 10/12/2025 Allergies as of 2018  Review Complete On: 2018 By: Rebecca Jane MD  
  
 Severity Noted Reaction Type Reactions Ancef [Cefazolin] High 2015    Hives Given at c/s, swelling of face/hives, tx'd with benadryl Pcn [Penicillins] High 2011   Systemic Hives Facial edema Peanut Medium 10/13/2014   Systemic Hives Chocolate [Cocoa]  2015    Hives Citrus And Derivatives  2015    Nausea and Vomiting Oranges only [place] Low 10/13/2014   Systemic Hives Beef Containing Products Low 10/13/2014   Systemic Hives Chicken Derived Low 10/13/2014   Systemic Hives [place] Low 10/13/2014   Systemic Hives Egg Low 10/13/2014   Systemic Hives Milk Low 10/13/2014   Systemic Hives Milk Prot-turm-pepper-pineappl Low 10/13/2014   Systemic Hives Shellfish Derived Low 10/13/2014   Systemic Hives Soy Low 10/13/2014   Systemic Hives [place] Low 10/13/2014   Systemic Hives Current Immunizations  Reviewed on 6/29/2018 Name Date Tdap 10/12/2015 Not reviewed this visit You Were Diagnosed With   
  
 Codes Comments Other acute pulmonary embolism without acute cor pulmonale (HCC)    -  Primary ICD-10-CM: I26.99 
ICD-9-CM: 415.19 Current use of anticoagulant therapy     ICD-10-CM: Z79.01 
ICD-9-CM: V58.61 Vitals BP Pulse Temp Resp Height(growth percentile) Weight(growth percentile) 118/72 (BP 1 Location: Right arm, BP Patient Position: Sitting) 87 98.5 °F (36.9 °C) (Oral) 18 5' 3\" (1.6 m) 186 lb (84.4 kg) LMP SpO2 BMI OB Status Smoking Status 07/09/2018 99% 32.95 kg/m2 Having regular periods Former Smoker Vitals History BMI and BSA Data Body Mass Index Body Surface Area 32.95 kg/m 2 1.94 m 2 Preferred Pharmacy Pharmacy Name Phone [address] [facility] [phone] Your Updated Medication List  
  
   
This list is accurate as of 7/13/18  3:00 PM.  Always use your most recent med list.  
  
  
  
  
 citalopram 40 mg tablet Commonly known as:  Gaston Peel Take 1 Tab by mouth once over twenty-four (24) hours for 180 days. levothyroxine 137 mcg tablet Commonly known as:  SYNTHROID Take 137.5 mcg by mouth Daily (before breakfast). warfarin 6 mg tablet Commonly known as:  COUMADIN Take 1 Tab by mouth daily. Prescriptions Sent to Pharmacy Refills  
 warfarin (COUMADIN) 6 mg tablet 2 Sig: Take 1 Tab by mouth daily. Class: Normal  
 Pharmacy: [facility] [address] [facility] #: [phone] Route: Oral  
  
July 2018 Details Sissy Buster Tue Wed Thu Fri Sat  
  1  
  
  
  
   2  
  
  
  
   3  
  
  
  
 4  
  
  
  
   5  
  
  
  
   6  
  
  
  
   7  
  
  
  
  
  8  
  
  
  
   9  
  
  
  
   10  
  
  
  
   11  
  
  
  
   12  
  
  
  
   13  
  
6 mg See details 14  
  
6 mg  
  
  
  15  
  
6 mg  
  
   16  
  
6 mg  
  
   17  
  
6 mg  
  
   18  
  
6 mg  
  
   19  
  
6 mg  
  
   20  
  
6 mg  
  
   21  
  
  
  
  
  22  
  
  
  
   23  
  
  
  
   24  
  
  
  
   25  
  
  
  
   26  
  
  
  
   27  
  
  
  
   28  
  
  
  
  
  29  
  
  
  
   30  
  
  
  
   31  
  
  
  
      
 Date Details 07/13 This INR check INR: 2.6 Date of next INR:  7/20/2018 How to take your warfarin dose To take:  6 mg Take one of the 6 mg tablets. We Performed the Following AMB POC PT/INR [47045 CPT(R)] Follow-up Instructions Return in about 1 week (around 7/20/2018) for INR check or sooner as needed. Patient Instructions A Healthy Lifestyle: Care Instructions Your Care Instructions A healthy lifestyle can help you feel good, stay at a healthy weight, and have plenty of energy for both work and play. A healthy lifestyle is something you can share with your whole family. A healthy lifestyle also can lower your risk for serious health problems, such as high blood pressure, heart disease, and diabetes. You can follow a few steps listed below to improve your health and the health of your family. Follow-up care is a key part of your treatment and safety. Be sure to make and go to all appointments, and call your doctor if you are having problems. It's also a good idea to know your test results and keep a list of the medicines you take. How can you care for yourself at home? · Do not eat too much sugar, fat, or fast foods. You can still have dessert and treats now and then. The goal is moderation. · Start small to improve your eating habits.  Pay attention to portion sizes, drink less juice and soda pop, and eat more fruits and vegetables. ¨ Eat a healthy amount of food. A 3-ounce serving of meat, for example, is about the size of a deck of cards. Fill the rest of your plate with vegetables and whole grains. ¨ Limit the amount of soda and sports drinks you have every day. Drink more water when you are thirsty. ¨ Eat at least 5 servings of fruits and vegetables every day. It may seem like a lot, but it is not hard to reach this goal. A serving or helping is 1 piece of fruit, 1 cup of vegetables, or 2 cups of leafy, raw vegetables. Have an apple or some carrot sticks as an afternoon snack instead of a candy bar. Try to have fruits and/or vegetables at every meal. 
· Make exercise part of your daily routine. You may want to start with simple activities, such as walking, bicycling, or slow swimming. Try to be active 30 to 60 minutes every day. You do not need to do all 30 to 60 minutes all at once. For example, you can exercise 3 times a day for 10 or 20 minutes. Moderate exercise is safe for most people, but it is always a good idea to talk to your doctor before starting an exercise program. 
· Keep moving. Justin Rohithbs the lawn, work in the garden, or V-Key. Take the stairs instead of the elevator at work. · If you smoke, quit. People who smoke have an increased risk for heart attack, stroke, cancer, and other lung illnesses. Quitting is hard, but there are ways to boost your chance of quitting tobacco for good. ¨ Use nicotine gum, patches, or lozenges. ¨ Ask your doctor about stop-smoking programs and medicines. ¨ Keep trying. In addition to reducing your risk of diseases in the future, you will notice some benefits soon after you stop using tobacco. If you have shortness of breath or asthma symptoms, they will likely get better within a few weeks after you quit. · Limit how much alcohol you drink.  Moderate amounts of alcohol (up to 2 drinks a day for men, 1 drink a day for women) are okay. But drinking too much can lead to liver problems, high blood pressure, and other health problems. Family health If you have a family, there are many things you can do together to improve your health. · Eat meals together as a family as often as possible. · Eat healthy foods. This includes fruits, vegetables, lean meats and dairy, and whole grains. · Include your family in your fitness plan. Most people think of activities such as jogging or tennis as the way to fitness, but there are many ways you and your family can be more active. Anything that makes you breathe hard and gets your heart pumping is exercise. Here are some tips: 
¨ Walk to do errands or to take your child to school or the bus. ¨ Go for a family bike ride after dinner instead of watching TV. Where can you learn more? Go to http://mamie-sandrita.info/. Enter V195 in the search box to learn more about \"A Healthy Lifestyle: Care Instructions. \" Current as of: December 7, 2017 Content Version: 11.7 © 0327-9780 [employer]. Care instructions adapted under license by [employer] (which disclaims liability or warranty for this information). If you have questions about a medical condition or this instruction, always ask your healthcare professional. [address] any warranty or liability for your use of this information. Introducing [facility] HE[facility]! Dear Ean: Thank you for requesting a Vitals ([email or web address]) account. Our records indicate that you already have an active Vitals ([email or web address]) account. You can access your account anytime at [email or web address] Did you know that you can access your hospital and ER discharge instructions at any time in Vitals ([email or web address])? You can also review all of your test results from your hospital stay or ER visit. Additional Information If you have questions, please visit the Frequently Asked Questions section of the [employer] website at [email or web address] Remember, [employer] Fuze is NOT to be used for urgent needs. For medical emergencies, dial 911. Now available from your iPhone and Android! Please provide this summary of care documentation to your next provider. Your primary care clinician is listed as [address]. If you have any questions after today's visit, please call [phone].

## 2018-07-13 NOTE — PATIENT INSTRUCTIONS

## 2018-07-13 NOTE — PROGRESS NOTES
Identified pt with two pt identifiers(name and ).     Chief Complaint   Patient presents with   [facility] Follow Up        Health Maintenance Due   Topic    HPV Age 9Y-34Y (1 of 3 - Female 3 Dose Series)    PAP AKA CERVICAL CYTOLOGY        Wt Readings from Last 3 Encounters:   18 186 lb (84.4 kg)   18 183 lb (83 kg)   18 185 lb (83.9 kg)     Temp Readings from Last 3 Encounters:   18 98.5 °F (36.9 °C) (Oral)   18 97.5 °F (36.4 °C) (Oral)   18 98 °F (36.7 °C) (Oral)     BP Readings from Last 3 Encounters:   18 118/72   18 107/70   18 (!) 82/50     Pulse Readings from Last 3 Encounters:   18 87   18 72   18 (!) 106         Learning Assessment:  :     Learning Assessment 2015 2014 3/21/2014   PRIMARY LEARNER Patient Patient Patient   HIGHEST LEVEL OF EDUCATION - PRIMARY LEARNER  GRADUATED HIGH SCHOOL OR GED GRADUATED HIGH SCHOOL OR GED GRADUATED HIGH SCHOOL OR GED   BARRIERS PRIMARY LEARNER NONE NONE NONE   CO-LEARNER CAREGIVER No No -   PRIMARY LANGUAGE ENGLISH ENGLISH ENGLISH   LEARNER PREFERENCE PRIMARY READING DEMONSTRATION DEMONSTRATION   ANSWERED BY Patient patient patient   RELATIONSHIP SELF SELF SELF       Depression Screening:  :     PHQ over the last two weeks 3/6/2018   Little interest or pleasure in doing things Not at all   Feeling down, depressed or hopeless Not at all   Total Score PHQ 2 0   Trouble falling or staying asleep, or sleeping too much -   Feeling tired or having little energy -   Poor appetite or overeating -   Feeling bad about yourself - or that you are a failure or have let yourself or your family down -   Trouble concentrating on things such as school, work, reading or watching TV -   Moving or speaking so slowly that other people could have noticed; or the opposite being so fidgety that others notice -   Thoughts of being better off dead, or hurting yourself in some way -   PHQ 9 Score -   How difficult have these problems made it for you to do your work, take care of your home and get along with others -       Fall Risk Assessment:  :     No flowsheet data found. Abuse Screening:  :     Abuse Screening Questionnaire 6/29/2018 10/27/2016 5/22/2014   Do you ever feel afraid of your partner? N N N   Are you in a relationship with someone who physically or mentally threatens you? N N N   Is it safe for you to go home? Y Y Y       Coordination of Care Questionnaire:  :     1) Have you been to an emergency room, urgent care clinic since your last visit? no   Hospitalized since your last visit? no             2) Have you seen or consulted any other health care providers outside of [address] [place] [address] since your last visit? no  (Include any pap smears or colon screenings in this section.)    3) Do you have an Advance Directive on file? no  Are you interested in receiving information about Advance Directives? no    Reviewed record in preparation for visit and have obtained necessary documentation. Medication reconciliation up to date and corrected with patient at this time.

## 2018-07-20 ENCOUNTER — OFFICE VISIT (OUTPATIENT)
Dept: FAMILY MEDICINE CLINIC | Age: 28
End: 2018-07-20

## 2018-07-20 VITALS
HEIGHT: 63 IN | SYSTOLIC BLOOD PRESSURE: 92 MMHG | TEMPERATURE: 98 F | OXYGEN SATURATION: 98 % | DIASTOLIC BLOOD PRESSURE: 60 MMHG | WEIGHT: 182 LBS | BODY MASS INDEX: 32.25 KG/M2 | RESPIRATION RATE: 18 BRPM | HEART RATE: 90 BPM

## 2018-07-20 DIAGNOSIS — I26.99 OTHER ACUTE PULMONARY EMBOLISM WITHOUT ACUTE COR PULMONALE (HCC): Primary | ICD-10-CM

## 2018-07-20 DIAGNOSIS — Z79.01 ANTICOAGULATION GOAL OF INR 2 TO 3: ICD-10-CM

## 2018-07-20 DIAGNOSIS — Z51.81 ANTICOAGULATION GOAL OF INR 2 TO 3: ICD-10-CM

## 2018-07-20 LAB
INR BLD: 3
PT POC: 35.5 SECONDS
VALID INTERNAL CONTROL?: YES

## 2018-07-20 NOTE — PROGRESS NOTES
Subjective:      Brigido Stewart is a 24 y.o. female here for anticoagulation monitoring. Anticoagulation Information:   · Indication: Pulmonary embolism   · INR Goal: 2-3   · Current dose: Coumadin 6 mg daily  · Missed Coumadin Doses: None  · Medication Changes: no  · Dietary Changes: no     · Symptoms: taking coumadin appropriately without any bleeding. Denies gum bleeding, epistaxis, hematemesis, melena, hematochezia, hematuria, easy bruising. Current Outpatient Prescriptions   Medication Sig Dispense Refill    warfarin (COUMADIN) 6 mg tablet Take 1 Tab by mouth daily. 30 Tab 2    levothyroxine (SYNTHROID) 137 mcg tablet Take 137.5 mcg by mouth Daily (before breakfast). 30 Tab 0    citalopram (CELEXA) 40 mg tablet Take 1 Tab by mouth once over twenty-four (24) hours for 180 days.  90 Tab 1       Allergies   Allergen Reactions    Ancef [Cefazolin] Hives     Given at c/s, swelling of face/hives, tx'd with benadryl    Pcn [Penicillins] Hives     Facial edema     Peanut Hives    Chocolate [Cocoa] Hives    Citrus And Derivatives Nausea and Vomiting     Oranges only    [place] Hives    Beef Containing Products Hives    Chicken Derived Hives    Corn Hives    Egg Hives    Milk Hives    Milk Prot-Turm-Pepper-Pineappl Hives    Shellfish Derived Hives    Soy Hives    Strawberry Hives       Past Medical History:   Diagnosis Date    Environmental allergies     GERD (gastroesophageal reflux disease)     Hashimoto's thyroiditis 10/24/2011    Hashimoto's thyroiditis 10/24/2011    Hypotension     Hypothyroid 2011    Hypothyroidism     Psychiatric disorder     depression --2 yr old daughter  2013    Unspecified adverse effect of anesthesia     per patient with epidural had severe N&V and passed out       Social History   Substance Use Topics    Smoking status: Former Smoker    Smokeless tobacco: Never Used    Alcohol use No        Review of Systems  Pertinent items are noted in HPI. Objective:     Vitals:    07/20/18 1434   BP: 92/60  Comment: Manual   BP 1 Location: Right arm   BP Patient Position: Sitting   Pulse: 90   Resp: 18   Temp: 98 °F (36.7 °C)  Comment: . TempSrc: Oral   SpO2: 98%   Weight: 182 lb (82.6 kg)   Height: 5' 3\" (1.6 m)     General appearance - alert, well appearing and in no distress  Eyes - pupils equal and reactive, extraocular eye movements intact, sclera anicteric  Oropharyngx - mucous membranes moist, pharynx normal without lesions  Chest - clear to auscultation, no wheezes, rales or rhonchi, symmetric air entry, no tachypnea, retractions or cyanosis  Heart - normal rate, regular rhythm, normal S1, S2, no murmurs, rubs, clicks or gallops    Recent Results (from the past 12 hour(s))   AMB POC PT/INR    Collection Time: 07/20/18  2:39 PM   Result Value Ref Range    VALID INTERNAL CONTROL POC Yes     Prothrombin time (POC) 35.5 seconds    INR POC 3.0        Assessment/Plan:   Michael Lee is a 24 y.o. female seen for:    1. Other acute pulmonary embolism without acute cor pulmonale ([facility] [address]): INR at goal, continue with Coumadin 6 mg daily. Has Hematology appointment scheduled for 7/31/18. Return in 2 weeks for INR check and if at goal at that time will start monthly monitoring.   - AMB POC PT/INR    2. Anticoagulation goal of INR 2 to 3      I have discussed the diagnosis with the patient and the intended plan as seen in the above orders. The patient has received an after-visit summary and questions were answered concerning future plans. I have discussed medication side effects and warnings with the patient as well. Patient verbalizes understanding of plan of care and denies further questions or concerns at this time. Informed patient to return to the office if symptoms worsen or if new symptoms arise. Follow-up Disposition:  Return in about 2 weeks (around 8/3/2018) for INR check or sooner as needed.

## 2018-07-20 NOTE — PROGRESS NOTES
Identified pt with two pt identifiers(name and ).     Chief Complaint   Patient presents with    Anticoagulation        Health Maintenance Due   Topic    HPV Age 9Y-34Y (1 of 3 - Female 3 Dose Series)    PAP AKA CERVICAL CYTOLOGY        Wt Readings from Last 3 Encounters:   18 182 lb (82.6 kg)   18 186 lb (84.4 kg)   18 183 lb (83 kg)     Temp Readings from Last 3 Encounters:   18 98.5 °F (36.9 °C) (Oral)   18 97.5 °F (36.4 °C) (Oral)     BP Readings from Last 3 Encounters:   18 118/72   18 107/70   18 (!) 82/50     Pulse Readings from Last 3 Encounters:   18 87   18 72   18 (!) 106         Learning Assessment:  :     Learning Assessment 2015 2014 3/21/2014   PRIMARY LEARNER Patient Patient Patient   HIGHEST LEVEL OF EDUCATION - PRIMARY LEARNER  GRADUATED HIGH SCHOOL OR GED GRADUATED HIGH SCHOOL OR GED GRADUATED HIGH SCHOOL OR GED   BARRIERS PRIMARY LEARNER NONE NONE NONE   CO-LEARNER CAREGIVER No No -   PRIMARY LANGUAGE ENGLISH ENGLISH ENGLISH   LEARNER PREFERENCE PRIMARY READING DEMONSTRATION DEMONSTRATION   ANSWERED BY Patient patient patient   RELATIONSHIP SELF SELF SELF       Depression Screening:  :     PHQ over the last two weeks 3/6/2018   Little interest or pleasure in doing things Not at all   Feeling down, depressed, irritable, or hopeless Not at all   Total Score PHQ 2 0   Trouble falling or staying asleep, or sleeping too much -   Feeling tired or having little energy -   Poor appetite, weight loss, or overeating -   Feeling bad about yourself - or that you are a failure or have let yourself or your family down -   Trouble concentrating on things such as school, work, reading, or watching TV -   Moving or speaking so slowly that other people could have noticed; or the opposite being so fidgety that others notice -   Thoughts of being better off dead, or hurting yourself in some way -   PHQ 9 Score -   How difficult have these problems made it for you to do your work, take care of your home and get along with others -       Fall Risk Assessment:  :     No flowsheet data found. Abuse Screening:  :     Abuse Screening Questionnaire 6/29/2018 10/27/2016 5/22/2014   Do you ever feel afraid of your partner? N N N   Are you in a relationship with someone who physically or mentally threatens you? N N N   Is it safe for you to go home? Y Y Y       Coordination of Care Questionnaire:  :     1) Have you been to an emergency room, urgent care clinic since your last visit? no   Hospitalized since your last visit? no             2) Have you seen or consulted any other health care providers outside of [address] [place] [address] since your last visit? no  (Include any pap smears or colon screenings in this section.)    3) Do you have an Advance Directive on file? no  Are you interested in receiving information about Advance Directives? no    Reviewed record in preparation for visit and have obtained necessary documentation. Medication reconciliation up to date and corrected with patient at this time.

## 2018-07-20 NOTE — PATIENT INSTRUCTIONS

## 2018-07-20 NOTE — MR AVS SNAPSHOT
[address] [facility] 
[phone] Patient: Esther Nieto MRN: [id number] :1990 Visit Information Date & Time Provider Department Dept. Phone Encounter #  
 2018  2:30 PM Kristen SinhaDerik 108 [phone] [id number] Follow-up Instructions Return in about 2 weeks (around 8/3/2018) for INR check or sooner as needed. Your Appointments 2018 11:00 AM  
Office Visit with Celina Ellsworth MD  
[facility] at [address]) Appt Note: new pt, Dx PE, Ref by DEEPAKTHONG HERBERT ([facility]); new pt, Dx PE, Ref by DEEPAKTHONG HERBERT ([facility]) [address] [place] [address], [address] [place] [phone]  
[phone]  
  
   
 [address] [place] [address], [address] [place] [address] Upcoming Health Maintenance Date Due  
 HPV Age 9Y-34Y (1 of 3 - Female 3 Dose Series) 10/4/2003 PAP AKA CERVICAL CYTOLOGY 2018 Influenza Age 5 to Adult 2018 Pneumococcal 19-64 Highest Risk (2 of 3 - PCV13) 3/6/2019 DTaP/Tdap/Td series (2 - Td) 10/12/2025 Allergies as of 2018  Review Complete On: 2018 By: Kristen Sinha MD  
  
 Severity Noted Reaction Type Reactions Ancef [Cefazolin] High 2015    Hives Given at c/s, swelling of face/hives, tx'd with benadryl Pcn [Penicillins] High 2011   Systemic Hives Facial edema Peanut Medium 10/13/2014   Systemic Hives Chocolate [Cocoa]  2015    Hives Citrus And Derivatives  2015    Nausea and Vomiting Oranges only [place] Low 10/13/2014   Systemic Hives Beef Containing Products Low 10/13/2014   Systemic Hives Chicken Derived Low 10/13/2014   Systemic Hives [place] Low 10/13/2014   Systemic Hives Egg Low 10/13/2014   Systemic Hives Milk Low 10/13/2014   Systemic Hives Milk Prot-turm-pepper-pineappl Low 10/13/2014   Systemic Hives Shellfish Derived Low 10/13/2014   Systemic Hives Soy Low 10/13/2014   Systemic Hives [place] Low 10/13/2014   Systemic Hives Current Immunizations  Reviewed on 6/29/2018 Name Date Tdap 10/12/2015 Not reviewed this visit You Were Diagnosed With   
  
 Codes Comments Other acute pulmonary embolism without acute cor pulmonale (HCC)    -  Primary ICD-10-CM: I26.99 
ICD-9-CM: 415.19 Anticoagulation goal of INR 2 to 3     ICD-10-CM: Z51.81, Z79.01 
ICD-9-CM: V58.83, V58.61 Vitals BP Pulse Temp Resp Height(growth percentile) Weight(growth percentile) 92/60 (BP 1 Location: Right arm, BP Patient Position: Sitting) 90 98 °F (36.7 °C) (Oral) 18 5' 3\" (1.6 m) 182 lb (82.6 kg) LMP SpO2 BMI OB Status Smoking Status 07/09/2018 98% 32.24 kg/m2 Having regular periods Former Smoker Vitals History BMI and BSA Data Body Mass Index Body Surface Area  
 32.24 kg/m 2 1.92 m 2 Preferred Pharmacy Pharmacy Name Phone 500 [address] [phone] Your Updated Medication List  
  
   
This list is accurate as of 7/20/18  2:55 PM.  Always use your most recent med list.  
  
  
  
  
 citalopram 40 mg tablet Commonly known as:  Lajuanda Gearing Take 1 Tab by mouth once over twenty-four (24) hours for 180 days. levothyroxine 137 mcg tablet Commonly known as:  SYNTHROID Take 137.5 mcg by mouth Daily (before breakfast). warfarin 6 mg tablet Commonly known as:  COUMADIN Take 1 Tab by mouth daily. July 2018 Details Sun Mon Tue Wed Thu Fri Sat  
  1  
  
  
  
   2  
  
  
  
   3  
  
  
  
   4  
  
  
  
   5  
  
  
  
   6  
  
  
  
   7  
  
  
  
  
  8  
  
  
  
   9  
  
  
  
   10  
  
  
  
   11  
  
  
  
   12  
  
  
  
   13  
  
  
  
   14  
  
  
  
  
  15  
  
  
  
   16  
  
  
  
   17  
  
  
  
 18  
  
  
  
   19  
  
  
  
   20  
  
6 mg See details 21  
  
6 mg  
  
  
  22  
  
6 mg  
  
   23  
  
6 mg  
  
   24  
  
6 mg  
  
   25  
  
6 mg  
  
   26  
  
6 mg  
  
   27  
  
6 mg  
  
   28  
  
6 mg  
  
  
  29  
  
6 mg  
  
   30  
  
6 mg  
  
   31  
  
6 mg Date Details 07/20 This INR check INR: 3.0 How to take your warfarin dose To take:  6 mg Take one of the 6 mg tablets. August 2018 Details Jose F Mcclelland Wed Thu Fri Sat 1  
  
6 mg 2  
  
6 mg  
  
   3  
  
6 mg  
  
   4  
  
  
  
  
  5  
  
  
  
   6  
  
  
  
   7  
  
  
  
   8  
  
  
  
   9  
  
  
  
   10  
  
  
  
   11  
  
  
  
  
  12  
  
  
  
   13  
  
  
  
   14  
  
  
  
   15  
  
  
  
   16  
  
  
  
   17  
  
  
  
   18  
  
  
  
  
  19  
  
  
  
   20  
  
  
  
   21  
  
  
  
   22  
  
  
  
   23  
  
  
  
   24  
  
  
  
   25  
  
  
  
  
  26  
  
  
  
   27  
  
  
  
   28  
  
  
  
   29  
  
  
  
   30  
  
  
  
   31  
  
  
  
   
 Date Details No additional details Date of next INR:  8/3/2018 How to take your warfarin dose To take:  6 mg Take one of the 6 mg tablets. We Performed the Following AMB POC PT/INR [83029 CPT(R)] Follow-up Instructions Return in about 2 weeks (around 8/3/2018) for INR check or sooner as needed. Patient Instructions A Healthy Lifestyle: Care Instructions Your Care Instructions A healthy lifestyle can help you feel good, stay at a healthy weight, and have plenty of energy for both work and play. A healthy lifestyle is something you can share with your whole family. A healthy lifestyle also can lower your risk for serious health problems, such as high blood pressure, heart disease, and diabetes. You can follow a few steps listed below to improve your health and the health of your family. Follow-up care is a key part of your treatment and safety. Be sure to make and go to all appointments, and call your doctor if you are having problems. It's also a good idea to know your test results and keep a list of the medicines you take. How can you care for yourself at home? · Do not eat too much sugar, fat, or fast foods. You can still have dessert and treats now and then. The goal is moderation. · Start small to improve your eating habits. Pay attention to portion sizes, drink less juice and soda pop, and eat more fruits and vegetables. ¨ Eat a healthy amount of food. A 3-ounce serving of meat, for example, is about the size of a deck of cards. Fill the rest of your plate with vegetables and whole grains. ¨ Limit the amount of soda and sports drinks you have every day. Drink more water when you are thirsty. ¨ Eat at least 5 servings of fruits and vegetables every day. It may seem like a lot, but it is not hard to reach this goal. A serving or helping is 1 piece of fruit, 1 cup of vegetables, or 2 cups of leafy, raw vegetables. Have an apple or some carrot sticks as an afternoon snack instead of a candy bar. Try to have fruits and/or vegetables at every meal. 
· Make exercise part of your daily routine. You may want to start with simple activities, such as walking, bicycling, or slow swimming. Try to be active 30 to 60 minutes every day. You do not need to do all 30 to 60 minutes all at once. For example, you can exercise 3 times a day for 10 or 20 minutes. Moderate exercise is safe for most people, but it is always a good idea to talk to your doctor before starting an exercise program. 
· Keep moving. [place] Bevels the lawn, work in the garden, or Thera[employer]. Take the stairs instead of the elevator at work. · If you smoke, quit. People who smoke have an increased risk for heart attack, stroke, cancer, and other lung illnesses.  Quitting is hard, but there are ways to boost your chance of quitting tobacco for good. ¨ Use nicotine gum, patches, or lozenges. ¨ Ask your doctor about stop-smoking programs and medicines. ¨ Keep trying. In addition to reducing your risk of diseases in the future, you will notice some benefits soon after you stop using tobacco. If you have shortness of breath or asthma symptoms, they will likely get better within a few weeks after you quit. · Limit how much alcohol you drink. Moderate amounts of alcohol (up to 2 drinks a day for men, 1 drink a day for women) are okay. But drinking too much can lead to liver problems, high blood pressure, and other health problems. Family health If you have a family, there are many things you can do together to improve your health. · Eat meals together as a family as often as possible. · Eat healthy foods. This includes fruits, vegetables, lean meats and dairy, and whole grains. · Include your family in your fitness plan. Most people think of activities such as jogging or tennis as the way to fitness, but there are many ways you and your family can be more active. Anything that makes you breathe hard and gets your heart pumping is exercise. Here are some tips: 
¨ Walk to do errands or to take your child to school or the bus. ¨ Go for a family bike ride after dinner instead of watching TV. Where can you learn more? Go to http://mamie-sandrita.info/. Enter M179 in the search box to learn more about \"A Healthy Lifestyle: Care Instructions. \" Current as of: December 7, 2017 Content Version: 11.7 © 4754-9893 netomat. Care instructions adapted under license by [employer] (which disclaims liability or warranty for this information). If you have questions about a medical condition or this instruction, always ask your healthcare professional. [address] any warranty or liability for your use of this information. Introducing [facility]! Dear Ean: Thank you for requesting a Keepstream account. Our records indicate that you already have an active Keepstream account. You can access your account anytime at [email or web address] Did you know that you can access your hospital and ER discharge instructions at any time in Keepstream? You can also review all of your test results from your hospital stay or ER visit. Additional Information If you have questions, please visit the Frequently Asked Questions section of the Keepstream website at [email or web address] Remember, Keepstream is NOT to be used for urgent needs. For medical emergencies, dial 911. Now available from your iPhone and Android! Please provide this summary of care documentation to your next provider. Your primary care clinician is listed as Rosemarie Manning. If you have any questions after today's visit, please call [phone].

## 2018-07-31 ENCOUNTER — OFFICE VISIT (OUTPATIENT)
Dept: ONCOLOGY | Age: 28
End: 2018-07-31

## 2018-07-31 VITALS
BODY MASS INDEX: 33.24 KG/M2 | OXYGEN SATURATION: 97 % | TEMPERATURE: 98.3 F | WEIGHT: 187.6 LBS | DIASTOLIC BLOOD PRESSURE: 66 MMHG | SYSTOLIC BLOOD PRESSURE: 103 MMHG | RESPIRATION RATE: 20 BRPM | HEIGHT: 63 IN | HEART RATE: 89 BPM

## 2018-07-31 DIAGNOSIS — I26.99 OTHER ACUTE PULMONARY EMBOLISM WITHOUT ACUTE COR PULMONALE (HCC): Primary | ICD-10-CM

## 2018-07-31 NOTE — PROGRESS NOTES
[facility] at [address], [address] [place] [address]  [facility] [phone]  F: [phone]      Reason for Visit:   Gautam Waddell is a 24 y.o. female who is seen in consultation at the request of ED (Dr. Shanice Fallon) for evaluation of PE. History of Present Illness:   Gautam Waddell is a pleasant 24 y.o. female who presents today for evaluation of pulmonary embolism. She reports starting OCPs last month. After a few weeks on therapy, she developed left rib pain, along with dyspnea, prompting an ED visit. There she was diagnosed with a pulmonary embolism and started on coumadin and bridged with lovenox. No dopplers were done, but she denies any leg swelling or pain at that time. She denies any risk factors other than OCP use. No surgeries, hospitalizations, trauma, immobilization, or long travel. She reports a maternal aunt with DVT provoked by surgery, no other family history. She reports she is tolerating coumadin well, with INR recently at goal, no longer on lovenox. Her chest wall pain persists on and off, but is mostly improved. Dyspnea improving as well. She is accompanied by her mother today.     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, congenital anomaly    HX  SECTION  2016      Social History   Substance Use Topics    Smoking status: Former Smoker     Packs/day: 0.10     Years: 1.00 Quit date: 2018    Smokeless tobacco: Never Used    Alcohol use No      Family History   Problem Relation Age of Onset    Heart Disease Mother      miltral value prolapse    Hypertension Father     Thyroid Disease Maternal Grandmother     Diabetes Paternal Grandmother     Diabetes Maternal Aunt     Cancer Paternal Grandfather      throat/stomach cancer,  at age 72    Breast Cancer Other      maternal great grandmother     Current Outpatient Prescriptions   Medication Sig    warfarin (COUMADIN) 6 mg tablet Take 1 Tab by mouth daily.  levothyroxine (SYNTHROID) 137 mcg tablet Take 137.5 mcg by mouth Daily (before breakfast).  citalopram (CELEXA) 40 mg tablet Take 1 Tab by mouth once over twenty-four (24) hours for 180 days. No current facility-administered medications for this visit. Allergies   Allergen Reactions    Ancef [Cefazolin] Hives     Given at c/s, swelling of face/hives, tx'd with benadryl    Pcn [Penicillins] Hives     Facial edema     Peanut Hives    Chocolate [Cocoa] Hives    Citrus And Derivatives Nausea and Vomiting     Oranges only    [place] Hives    Beef Containing Products Hives    Chicken Derived Hives    Corn Hives    Egg Hives    Milk Hives    Milk Prot-Turm-Pepper-Pineappl Hives    Shellfish Derived Hives    Soy Hives    Strawberry Hives        Review of Systems: A complete review of systems was obtained, negative except as described above.     Physical Exam:     Visit Vitals    /66    Pulse 89    Temp 98.3 °F (36.8 °C) (Temporal)    Resp 20    Ht 5' 3\" (1.6 m)    Wt 187 lb 9.6 oz (85.1 kg)    LMP 2018    SpO2 97%    BMI 33.23 kg/m2     General: No distress  Eyes: PERRLA, anicteric sclerae  HENT: Atraumatic, OP clear  Neck: Supple  Lymphatic: No cervical, supraclavicular, or inguinal adenopathy  Respiratory: CTAB, normal respiratory effort  CV: Normal rate, regular rhythm, no murmurs, no peripheral edema  GI: Soft, nontender, nondistended, no masses, no hepatomegaly, no splenomegaly  MS: Normal gait and station. Digits without clubbing or cyanosis. Skin: No rashes, ecchymoses, or petechiae. Normal temperature, turgor, and texture. Psych: Alert, oriented, appropriate affect, normal judgment/insight    Results:     Lab Results   Component Value Date/Time    WBC 6.5 07/11/2018 01:32 PM    HGB 11.8 07/11/2018 01:32 PM    HCT 36.9 07/11/2018 01:32 PM    PLATELET 828 (H) 32/29/2368 01:32 PM    MCV 81.8 07/11/2018 01:32 PM    ABS. NEUTROPHILS 3.5 07/11/2018 01:32 PM    Hemoglobin (POC) 13.3 03/23/2014 05:12 AM    Hematocrit (POC) 33 (L) 07/03/2018 06:54 PM     Lab Results   Component Value Date/Time    Sodium 138 07/11/2018 01:32 PM    Potassium 3.9 07/11/2018 01:32 PM    Chloride 102 07/11/2018 01:32 PM    CO2 25 07/11/2018 01:32 PM    Glucose 93 07/11/2018 01:32 PM    BUN 10 07/11/2018 01:32 PM    Creatinine 0.82 07/11/2018 01:32 PM    GFR est AA >60 07/11/2018 01:32 PM    GFR est non-AA >60 07/11/2018 01:32 PM    Calcium 9.4 07/11/2018 01:32 PM    Sodium (POC) 139 07/03/2018 06:54 PM    Potassium (POC) 3.6 07/03/2018 06:54 PM    Chloride (POC) 103 07/03/2018 06:54 PM    Glucose (POC) 100 07/03/2018 06:54 PM    BUN (POC) 7 (L) 07/03/2018 06:54 PM    Creatinine (POC) 0.8 07/03/2018 06:54 PM    Calcium, ionized (POC) 1.19 07/03/2018 06:54 PM     Lab Results   Component Value Date/Time    Bilirubin, total 0.1 (L) 07/05/2018 12:47 AM    ALT (SGPT) 15 07/05/2018 12:47 AM    AST (SGOT) 11 (L) 07/05/2018 12:47 AM    Alk.  phosphatase 75 07/05/2018 12:47 AM    Protein, total 7.7 07/05/2018 12:47 AM    Albumin 3.2 (L) 07/05/2018 12:47 AM    Globulin 4.5 (H) 07/05/2018 12:47 AM     Lab Results   Component Value Date/Time    Sed rate, automated 36 (H) 07/04/2018 01:14 AM    TSH 26.30 (H) 07/04/2018 01:14 AM    Lipase 63 (L) 01/09/2018 10:54 AM    HIV 1/O/2 Abs <1.00 05/06/2015 03:23 PM     Lab Results   Component Value Date/Time    INR 2.0 (H) 07/11/2018 01:32 PM    INR POC 3.0 07/20/2018 02:39 PM    aPTT 30.8 07/04/2018 01:14 AM    D-dimer 2.51 (H) 07/03/2018 06:24 PM    Fibrinogen 386 07/04/2018 01:14 AM    Antithrombin Activity 93 07/04/2018 01:14 AM    Protein S-Functional 61 (L) 07/04/2018 01:14 AM    Protein C-Functional 131 07/04/2018 01:14 AM       7/4/2018  Prothrombin mutation: negative  Factor V Leiden mutation: negative      CTA Chest 5/21/2014: negative for PE    CTA Chest 7/3/2018: Pulmonary embolus in RUL, RLL, JOSÉ, LLL, and right main pulmonary artery. Records reviewed and summarized above. Assessment:   1) Pulmonary embolism  Diagnosed 7/3/2018. Risk factors at that time include obesity and recent OCP use. Treated with coumadin since that time. This is likely a provoked event, based on her OCP use. I would like to complete her thrombophilia panel with APLA testing. If negative, I recommend a total of 3 months of anticoagulation. Coumadin is appropriate and she seems to be tolerating this well. We discussed the DOACs as well, but she prefers to remain on coumadin for now, and this is especially reasonable while we rule out APLA. I will obtain some baseline dopplers at the 3 month gen, to assess for any chronic thrombus and to have for future comparison. 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 Not sure this has any clinical significance, can get false levels in the setting of acute events. I recommend we repeat this after a month off anticoagulation. Plan:     · Labs: APLA panel  · Continue coumadin until she follows up with me, INR monitored by her PCP  · Bilateral LE dopplers in October  · Return to see me in October    I appreciate the opportunity to participate in Ms. Peter Parks [address].     Signed By: Vicki Avitia MD

## 2018-08-01 ENCOUNTER — APPOINTMENT (OUTPATIENT)
Dept: GENERAL RADIOLOGY | Age: 28
End: 2018-08-01
Attending: EMERGENCY MEDICINE
Payer: MEDICAID

## 2018-08-01 ENCOUNTER — HOSPITAL ENCOUNTER (EMERGENCY)
Age: 28
Discharge: HOME | End: 2018-08-01
Attending: EMERGENCY MEDICINE
Payer: MEDICAID

## 2018-08-01 VITALS
HEIGHT: 62 IN | RESPIRATION RATE: 22 BRPM | HEART RATE: 80 BPM | DIASTOLIC BLOOD PRESSURE: 63 MMHG | WEIGHT: 190.48 LBS | SYSTOLIC BLOOD PRESSURE: 111 MMHG | BODY MASS INDEX: 35.05 KG/M2 | TEMPERATURE: 98.3 F | OXYGEN SATURATION: 100 %

## 2018-08-01 DIAGNOSIS — R07.9 CHEST PAIN, UNSPECIFIED TYPE: Primary | ICD-10-CM

## 2018-08-01 DIAGNOSIS — I26.99 OTHER PULMONARY EMBOLISM WITHOUT ACUTE COR PULMONALE, UNSPECIFIED CHRONICITY (HCC): ICD-10-CM

## 2018-08-01 LAB
ALBUMIN SERPL-MCNC: 3.4 G/DL (ref 3.5–5)
ALBUMIN/GLOB SERPL: 0.8 {RATIO} (ref 1.1–2.2)
ALP SERPL-CCNC: 68 U/L (ref 45–117)
ALT SERPL-CCNC: 20 U/L (ref 12–78)
ANION GAP SERPL CALC-SCNC: 10 MMOL/L (ref 5–15)
AST SERPL-CCNC: 14 U/L (ref 15–37)
BASOPHILS # BLD: 0.1 K/UL (ref 0–0.1)
BASOPHILS NFR BLD: 1 % (ref 0–1)
BILIRUB SERPL-MCNC: 0.1 MG/DL (ref 0.2–1)
BUN SERPL-MCNC: 12 MG/DL (ref 6–20)
BUN/CREAT SERPL: 17 (ref 12–20)
CALCIUM SERPL-MCNC: 9 MG/DL (ref 8.5–10.1)
CHLORIDE SERPL-SCNC: 101 MMOL/L (ref 97–108)
CO2 SERPL-SCNC: 25 MMOL/L (ref 21–32)
CREAT SERPL-MCNC: 0.71 MG/DL (ref 0.55–1.02)
DIFFERENTIAL METHOD BLD: ABNORMAL
EOSINOPHIL # BLD: 0.1 K/UL (ref 0–0.4)
EOSINOPHIL NFR BLD: 1 % (ref 0–7)
ERYTHROCYTE [DISTWIDTH] IN BLOOD BY AUTOMATED COUNT: 13.7 % (ref 11.5–14.5)
GLOBULIN SER CALC-MCNC: 4.3 G/DL (ref 2–4)
GLUCOSE SERPL-MCNC: 89 MG/DL (ref 65–100)
HCG UR QL: NEGATIVE
HCT VFR BLD AUTO: 33.5 % (ref 35–47)
HGB BLD-MCNC: 11.1 G/DL (ref 11.5–16)
INR PPP: 2.1 (ref 0.9–1.1)
LYMPHOCYTES # BLD: 2.2 K/UL (ref 0.8–3.5)
LYMPHOCYTES NFR BLD: 23 % (ref 12–49)
MCH RBC QN AUTO: 26.7 PG (ref 26–34)
MCHC RBC AUTO-ENTMCNC: 33.1 G/DL (ref 30–36.5)
MCV RBC AUTO: 80.5 FL (ref 80–99)
MONOCYTES # BLD: 1.2 K/UL (ref 0–1)
MONOCYTES NFR BLD: 12 % (ref 5–13)
NEUTS SEG # BLD: 6.3 K/UL (ref 1.8–8)
NEUTS SEG NFR BLD: 63 % (ref 32–75)
PLATELET # BLD AUTO: 324 K/UL (ref 150–400)
PMV BLD AUTO: 9.9 FL (ref 8.9–12.9)
POTASSIUM SERPL-SCNC: 4.1 MMOL/L (ref 3.5–5.1)
PROT SERPL-MCNC: 7.7 G/DL (ref 6.4–8.2)
PROTHROMBIN TIME: 20.3 SEC (ref 9–11.1)
RBC # BLD AUTO: 4.16 M/UL (ref 3.8–5.2)
SODIUM SERPL-SCNC: 136 MMOL/L (ref 136–145)
TROPONIN I SERPL-MCNC: <0.05 NG/ML
WBC # BLD AUTO: 9.8 K/UL (ref 3.6–11)
XXWBCSUS: 0

## 2018-08-01 PROCEDURE — 84484 ASSAY OF TROPONIN QUANT: CPT | Performed by: EMERGENCY MEDICINE

## 2018-08-01 PROCEDURE — 93005 ELECTROCARDIOGRAM TRACING: CPT

## 2018-08-01 PROCEDURE — 81025 URINE PREGNANCY TEST: CPT

## 2018-08-01 PROCEDURE — 99285 EMERGENCY DEPT VISIT HI MDM: CPT

## 2018-08-01 PROCEDURE — 74011250636 HC RX REV CODE- 250/636: Performed by: EMERGENCY MEDICINE

## 2018-08-01 PROCEDURE — 96361 HYDRATE IV INFUSION ADD-ON: CPT

## 2018-08-01 PROCEDURE — 80053 COMPREHEN METABOLIC PANEL: CPT | Performed by: EMERGENCY MEDICINE

## 2018-08-01 PROCEDURE — 85025 COMPLETE CBC W/AUTO DIFF WBC: CPT | Performed by: EMERGENCY MEDICINE

## 2018-08-01 PROCEDURE — 36415 COLL VENOUS BLD VENIPUNCTURE: CPT | Performed by: EMERGENCY MEDICINE

## 2018-08-01 PROCEDURE — 85610 PROTHROMBIN TIME: CPT | Performed by: EMERGENCY MEDICINE

## 2018-08-01 PROCEDURE — 71046 X-RAY EXAM CHEST 2 VIEWS: CPT

## 2018-08-01 PROCEDURE — 96360 HYDRATION IV INFUSION INIT: CPT

## 2018-08-01 RX ADMIN — SODIUM CHLORIDE 1000 ML: 900 INJECTION, SOLUTION INTRAVENOUS at 21:20

## 2018-08-02 ENCOUNTER — OFFICE VISIT (OUTPATIENT)
Dept: FAMILY MEDICINE CLINIC | Age: 28
End: 2018-08-02

## 2018-08-02 VITALS
HEIGHT: 62 IN | WEIGHT: 191 LBS | TEMPERATURE: 98 F | DIASTOLIC BLOOD PRESSURE: 72 MMHG | HEART RATE: 98 BPM | RESPIRATION RATE: 18 BRPM | SYSTOLIC BLOOD PRESSURE: 110 MMHG | BODY MASS INDEX: 35.15 KG/M2 | OXYGEN SATURATION: 98 %

## 2018-08-02 DIAGNOSIS — E03.8 HYPOTHYROIDISM DUE TO HASHIMOTO'S THYROIDITIS: ICD-10-CM

## 2018-08-02 DIAGNOSIS — I26.99 OTHER ACUTE PULMONARY EMBOLISM WITHOUT ACUTE COR PULMONALE (HCC): Primary | ICD-10-CM

## 2018-08-02 DIAGNOSIS — E06.3 HYPOTHYROIDISM DUE TO HASHIMOTO'S THYROIDITIS: ICD-10-CM

## 2018-08-02 LAB
ATRIAL RATE: 84 BPM
CALCULATED P AXIS, ECG09: 57 DEGREES
CALCULATED R AXIS, ECG10: 2 DEGREES
CALCULATED T AXIS, ECG11: 31 DEGREES
DIAGNOSIS, 93000: NORMAL
INR BLD: 2.8
P-R INTERVAL, ECG05: 152 MS
PT POC: 33.9 SECONDS
Q-T INTERVAL, ECG07: 366 MS
QRS DURATION, ECG06: 90 MS
QTC CALCULATION (BEZET), ECG08: 432 MS
VALID INTERNAL CONTROL?: YES
VENTRICULAR RATE, ECG03: 84 BPM

## 2018-08-02 RX ORDER — LEVOTHYROXINE SODIUM 137 UG/1
137.5 TABLET ORAL
Qty: 30 TAB | Refills: 0 | Status: SHIPPED | OUTPATIENT
Start: 2018-08-02 | End: 2018-09-25 | Stop reason: SDUPTHER

## 2018-08-02 NOTE — ED NOTES
Patient provided and informed of purposeful rounding to include collaboration of entire care team; patient acknowledged understanding.

## 2018-08-02 NOTE — ED TRIAGE NOTES
Pt rpts hx of recent PE's. Pt with c/o shortness of breath and intermittent chest pain with radiation to her neck. Pt rpts general fatigue over the last several days.

## 2018-08-02 NOTE — ED NOTES
Discharge note: The patient was discharged home in stable condition, accompanied by family member. The patient is alert and oriented, is in no respiratory distress and has vital signs within normal limits. The patient's diagnosis, condition and treatment were explained to patient by Dr Favian Mckeon. The patient expressed understanding of discharge instructions and plan of care. A discharge plan has been developed. A  was not involved in the process. Patient offered a wheelchair to ED lobby for discharge but declined at this time. Patient ambulatory with a steady gate to ED lobby to go home with family member.

## 2018-08-02 NOTE — PROGRESS NOTES
Identified pt with two pt identifiers(name and ).     Chief Complaint   Patient presents with    Anticoagulation        Health Maintenance Due   Topic    HPV Age 9Y-34Y (1 of 3 - Female 3 Dose Series)    PAP AKA CERVICAL CYTOLOGY     Influenza Age 5 to Adult        Wt Readings from Last 3 Encounters:   18 191 lb (86.6 kg)   18 190 lb 7.6 oz (86.4 kg)   18 187 lb 9.6 oz (85.1 kg)     Temp Readings from Last 3 Encounters:   18 98 °F (36.7 °C) (Oral)   18 98.3 °F (36.8 °C)   18 98.3 °F (36.8 °C) (Temporal)     BP Readings from Last 3 Encounters:   18 110/72   18 111/63   18 103/66     Pulse Readings from Last 3 Encounters:   18 98   18 80   18 89         Learning Assessment:  :     Learning Assessment 2015 2014 3/21/2014   PRIMARY LEARNER Patient Patient Patient   HIGHEST LEVEL OF EDUCATION - PRIMARY LEARNER  GRADUATED HIGH SCHOOL OR GED GRADUATED HIGH SCHOOL OR GED GRADUATED HIGH SCHOOL OR GED   BARRIERS PRIMARY LEARNER NONE NONE NONE   CO-LEARNER CAREGIVER No No -   PRIMARY LANGUAGE ENGLISH ENGLISH ENGLISH   LEARNER PREFERENCE PRIMARY READING DEMONSTRATION DEMONSTRATION   ANSWERED BY Patient patient patient   RELATIONSHIP SELF SELF SELF       Depression Screening:  :     PHQ over the last two weeks 3/6/2018   Little interest or pleasure in doing things Not at all   Feeling down, depressed, irritable, or hopeless Not at all   Total Score PHQ 2 0   Trouble falling or staying asleep, or sleeping too much -   Feeling tired or having little energy -   Poor appetite, weight loss, or overeating -   Feeling bad about yourself - or that you are a failure or have let yourself or your family down -   Trouble concentrating on things such as school, work, reading, or watching TV -   Moving or speaking so slowly that other people could have noticed; or the opposite being so fidgety that others notice -   Thoughts of being better off dead, or hurting yourself in some way -   PHQ 9 Score -   How difficult have these problems made it for you to do your work, take care of your home and get along with others -       Fall Risk Assessment:  :     No flowsheet data found. Abuse Screening:  :     Abuse Screening Questionnaire 6/29/2018 10/27/2016 5/22/2014   Do you ever feel afraid of your partner? N N N   Are you in a relationship with someone who physically or mentally threatens you? N N N   Is it safe for you to go home? Cony Najera       Coordination of Care Questionnaire:  :     1) Have you been to an emergency room, urgent care clinic since your last visit? Yes [facility]  Hospitalized since your last visit? no             2) Have you seen or consulted any other health care providers outside of [address] [place] [address] since your last visit? no  (Include any pap smears or colon screenings in this section.)    3) Do you have an Advance Directive on file? no  Are you interested in receiving information about Advance Directives? no    Reviewed record in preparation for visit and have obtained necessary documentation. Medication reconciliation up to date and corrected with patient at this time.

## 2018-08-02 NOTE — PATIENT INSTRUCTIONS
Return for lab only appointment for thyroid function testing on/after 8/15/18. A Healthy Lifestyle: Care Instructions  Your Care Instructions    A healthy lifestyle can help you feel good, stay at a healthy weight, and have plenty of energy for both work and play. A healthy lifestyle is something you can share with your whole family. A healthy lifestyle also can lower your risk for serious health problems, such as high blood pressure, heart disease, and diabetes. You can follow a few steps listed below to improve your health and the health of your family. Follow-up care is a key part of your treatment and safety. Be sure to make and go to all appointments, and call your doctor if you are having problems. It's also a good idea to know your test results and keep a list of the medicines you take. How can you care for yourself at home? · Do not eat too much sugar, fat, or fast foods. You can still have dessert and treats now and then. The goal is moderation. · Start small to improve your eating habits. Pay attention to portion sizes, drink less juice and soda pop, and eat more fruits and vegetables. ¨ Eat a healthy amount of food. A 3-ounce serving of meat, for example, is about the size of a deck of cards. Fill the rest of your plate with vegetables and whole grains. ¨ Limit the amount of soda and sports drinks you have every day. Drink more water when you are thirsty. ¨ Eat at least 5 servings of fruits and vegetables every day. It may seem like a lot, but it is not hard to reach this goal. A serving or helping is 1 piece of fruit, 1 cup of vegetables, or 2 cups of leafy, raw vegetables. Have an apple or some carrot sticks as an afternoon snack instead of a candy bar. Try to have fruits and/or vegetables at every meal.  · Make exercise part of your daily routine. You may want to start with simple activities, such as walking, bicycling, or slow swimming. Try to be active 30 to 60 minutes every day.  You do not need to do all 30 to 60 minutes all at once. For example, you can exercise 3 times a day for 10 or 20 minutes. Moderate exercise is safe for most people, but it is always a good idea to talk to your doctor before starting an exercise program.  · Keep moving. Yaneth Pages the lawn, work in the garden, or [place]. Take the stairs instead of the elevator at work. · If you smoke, quit. People who smoke have an increased risk for heart attack, stroke, cancer, and other lung illnesses. Quitting is hard, but there are ways to boost your chance of quitting tobacco for good. ¨ Use nicotine gum, patches, or lozenges. ¨ Ask your doctor about stop-smoking programs and medicines. ¨ Keep trying. In addition to reducing your risk of diseases in the future, you will notice some benefits soon after you stop using tobacco. If you have shortness of breath or asthma symptoms, they will likely get better within a few weeks after you quit. · Limit how much alcohol you drink. Moderate amounts of alcohol (up to 2 drinks a day for men, 1 drink a day for women) are okay. But drinking too much can lead to liver problems, high blood pressure, and other health problems. Family health  If you have a family, there are many things you can do together to improve your health. · Eat meals together as a family as often as possible. · Eat healthy foods. This includes fruits, vegetables, lean meats and dairy, and whole grains. · Include your family in your fitness plan. Most people think of activities such as jogging or tennis as the way to fitness, but there are many ways you and your family can be more active. Anything that makes you breathe hard and gets your heart pumping is exercise. Here are some tips:  ¨ Walk to do errands or to take your child to school or the bus. ¨ Go for a family bike ride after dinner instead of watching TV. Where can you learn more? Go to http://mamie-sandrita.info/.   Enter R198 in the search box to learn more about \"A Healthy Lifestyle: Care Instructions. \"  Current as of: December 7, 2017  Content Version: 11.7  © 0798-2543 [employer]. Care instructions adapted under license by [employer] (which disclaims liability or warranty for this information). If you have questions about a medical condition or this instruction, always ask your healthcare professional. [address] any warranty or liability for your use of this information.

## 2018-08-02 NOTE — DISCHARGE INSTRUCTIONS
Chest Pain: Care Instructions  Your Care Instructions    There are many things that can cause chest pain. Some are not serious and will get better on their own in a few days. But some kinds of chest pain need more testing and treatment. Your doctor may have recommended a follow-up visit in the next 8 to 12 hours. If you are not getting better, you may need more tests or treatment. Even though your doctor has released you, you still need to watch for any problems. The doctor carefully checked you, but sometimes problems can develop later. If you have new symptoms or if your symptoms do not get better, get medical care right away. If you have worse or different chest pain or pressure that lasts more than 5 minutes or you passed out (lost consciousness), call 911 or seek other emergency help right away. A medical visit is only one step in your treatment. Even if you feel better, you still need to do what your doctor recommends, such as going to all suggested follow-up appointments and taking medicines exactly as directed. This will help you recover and help prevent future problems. How can you care for yourself at home? · Rest until you feel better. · Take your medicine exactly as prescribed. Call your doctor if you think you are having a problem with your medicine. · Do not drive after taking a prescription pain medicine. When should you call for help? Call 911 if:    · You passed out (lost consciousness).     · You have severe difficulty breathing.     · You have symptoms of a heart attack. These may include:  ¨ Chest pain or pressure, or a strange feeling in your chest.  ¨ Sweating. ¨ Shortness of breath. ¨ Nausea or vomiting. ¨ Pain, pressure, or a strange feeling in your back, neck, jaw, or upper belly or in one or both shoulders or arms. ¨ Lightheadedness or sudden weakness. ¨ A fast or irregular heartbeat.   After you call 911, the  may tell you to chew 1 adult-strength or 2 to 4 low-dose aspirin. Wait for an ambulance. Do not try to drive yourself.    Call your doctor today if:    · You have any trouble breathing.     · Your chest pain gets worse.     · You are dizzy or lightheaded, or you feel like you may faint.     · You are not getting better as expected.     · You are having new or different chest pain. Where can you learn more? Go to http://mamie-sandrita.info/. Enter A120 in the search box to learn more about \"Chest Pain: Care Instructions. \"  Current as of: November 20, 2017  Content Version: 11.7  © 1183-1728 [employer]. Care instructions adapted under license by [employer] (which disclaims liability or warranty for this information). If you have questions about a medical condition or this instruction, always ask your healthcare professional. [address] any warranty or liability for your use of this information. Taking Warfarin Safely: Care Instructions  Your Care Instructions    Warfarin is a medicine that you take to prevent blood clots. It is often called a blood thinner. Doctors give warfarin (such as Coumadin) to reduce the risk of blood clots. You may be at risk for blood clots if you have atrial fibrillation or deep vein thrombosis. Some other health problems may also put you at risk. Warfarin slows the amount of time it takes for your blood to clot. It can cause bleeding problems. Even if you've been taking warfarin for a while, it's important to know how to take it safely. Foods and other medicines can affect the way warfarin works. Some can make warfarin work too well. This can cause bleeding problems. And some can make it work poorly, so that it does not prevent blood clots very well. You will need regular blood tests to check how long it takes for your blood to form a clot. This test is called a PT or prothrombin time test. The result of the test is called an INR level.  Depending on the test results, your doctor or anticoagulation clinic may adjust your dose of warfarin. Follow-up care is a key part of your treatment and safety. Be sure to make and go to all appointments, and call your doctor if you are having problems. It's also a good idea to know your test results and keep a list of the medicines you take. How can you care for yourself at home? Take warfarin safely  · Take your warfarin at the same time each day. · If you miss a dose of warfarin, don't take an extra dose to make up for it. Your doctor can tell you exactly what to do so you don't take too much or too little. · Wear medical alert jewelry that lets others know that you take warfarin. You can buy this at most drugstores. · Don't take warfarin if you are pregnant or planning to get pregnant. Talk to your doctor about how you can prevent getting pregnant while you are taking it. · Don't change your dose or stop taking warfarin unless your doctor tells you to. Effects of medicines and food on warfarin  · Don't start or stop taking any medicines, vitamins, or natural remedies unless you first talk to your doctor. Many medicines can affect how warfarin works. These include aspirin and other pain relievers, over-the-counter medicines, multivitamins, dietary supplements, and herbal products. · Tell all of your doctors and pharmacists that you take warfarin. Some prescription medicines can affect how warfarin works. · Keep the amount of vitamin K in your diet about the same from day to day. Do not suddenly eat a lot more or a lot less food that is rich in vitamin K than you usually do. Vitamin K affects how warfarin works and how your blood clots. Talk with your doctor before making big changes in your diet. Foods that have a lot of vitamin K include cooked green vegetables, such as:  ¨ Kale, spinach, turnip greens, akin greens, Swiss chard, and mustard greens. ¨ [place] sprouts, broccoli, and asparagus.   · Limit your use of alcohol. Avoid bleeding by preventing falls and injuries  · Wear slippers or shoes with nonskid soles. · Remove throw rugs and clutter. · Rearrange furniture and electrical cords to keep them out of walking paths. · Keep stairways, porches, and outside walkways well lit. Use night-lights in hallways and bathrooms. · Be extra careful when you work with sharp tools or knives. When should you call for help? Call 911 anytime you think you may need emergency care. For example, call if:    · You have a sudden, severe headache that is different from past headaches.    Call your doctor now or seek immediate medical care if:    · You have any abnormal bleeding, such as:  ¨ Nosebleeds. ¨ Vaginal bleeding that is different (heavier, more frequent, at a different time of the month) than what you are used to. ¨ Bloody or black stools, or rectal bleeding. ¨ Bloody or pink urine.    Watch closely for changes in your health, and be sure to contact your doctor if you have any problems. Where can you learn more? Go to http://mamie-sandrita.info/. Enter Q189 in the search box to learn more about \"Taking Warfarin Safely: Care Instructions. \"  Current as of: December 6, 2017  Content Version: 11.7  © 8377-3466 [employer]. Care instructions adapted under license by [employer] (which disclaims liability or warranty for this information). If you have questions about a medical condition or this instruction, always ask your healthcare professional. [place] [address] any warranty or liability for your use of this information.

## 2018-08-02 NOTE — MR AVS SNAPSHOT
[address] [facility] 
 
 
 [address] [facility] 
[phone] Patient: Lindsay Rowe MRN: [id number] :1990 Visit Information Date & Time Provider Department Dept. Phone Encounter #  
 2018  2:00 PM Segundo Castillo [phone] [id number] Follow-up Instructions Return in about 4 weeks (around 2018) for INR check or sooner as needed. Your Appointments 10/31/2018 11:00 AM  
ESTABLISHED PATIENT with Orlando Bai MD  
[facility] at [address] [facility] [address] [facility]) Appt Note: MARYJANE Bolaños fu.  
 [address] [facility], [address] [place] [address]  
[phone]  
  
   
 [address] [place] [address], [address] [facility] [address] [facility] Upcoming Health Maintenance Date Due  
 HPV Age 9Y-34Y (1 of 3 - Female 3 Dose Series) 10/4/2003 PAP AKA CERVICAL CYTOLOGY 2018 Influenza Age 5 to Adult 2018 Pneumococcal 19-64 Highest Risk (2 of 3 - PCV13) 3/6/2019 DTaP/Tdap/Td series (2 - Td) 10/12/2025 Allergies as of 2018  Review Complete On: 2018 By: Jairo Gipson MD  
  
 Severity Noted Reaction Type Reactions Ancef [Cefazolin] High 2015    Hives Given at c/s, swelling of face/hives, tx'd with benadryl Pcn [Penicillins] High 2011   Systemic Hives Facial edema Peanut Medium 10/13/2014   Systemic Hives Chocolate [Cocoa]  2015    Hives Citrus And Derivatives  2015    Nausea and Vomiting Oranges only [place] Low 10/13/2014   Systemic Hives Beef Containing Products Low 10/13/2014   Systemic Hives Chicken Derived Low 10/13/2014   Systemic Hives [place] Low 10/13/2014   Systemic Hives Egg Low 10/13/2014   Systemic Hives Milk Low 10/13/2014   Systemic Hives Milk Prot-turm-pepper-pineappl Low 10/13/2014   Systemic Hives Shellfish Derived Low 10/13/2014   Systemic Hives Soy Low 10/13/2014   Systemic Hives [place] Low 10/13/2014   Systemic Hives Current Immunizations  Reviewed on 6/29/2018 Name Date Tdap 10/12/2015 Not reviewed this visit You Were Diagnosed With   
  
 Codes Comments Other acute pulmonary embolism without acute cor pulmonale (HCC)    -  Primary ICD-10-CM: I26.99 
ICD-9-CM: 415.19 Hypothyroidism due to Hashimoto's thyroiditis     ICD-10-CM: E03.8, E06.3 ICD-9-CM: 244.8, 245.2 Vitals BP Pulse Temp Resp Height(growth percentile) Weight(growth percentile) 110/72 (BP 1 Location: Right arm, BP Patient Position: Sitting) 98 98 °F (36.7 °C) (Oral) 18 5' 2\" (1.575 m) 191 lb (86.6 kg) LMP SpO2 BMI OB Status Smoking Status 07/09/2018 98% 34.93 kg/m2 Having regular periods Former Smoker Vitals History BMI and BSA Data Body Mass Index Body Surface Area 34.93 kg/m 2 1.95 m 2 Preferred Pharmacy Pharmacy Name Phone [address] [facility] [phone] Your Updated Medication List  
  
   
This list is accurate as of 8/2/18  2:30 PM.  Always use your most recent med list.  
  
  
  
  
 citalopram 40 mg tablet Commonly known as:  Lisa Babe Take 1 Tab by mouth once over twenty-four (24) hours for 180 days. levothyroxine 137 mcg tablet Commonly known as:  SYNTHROID Take 137.5 mcg by mouth Daily (before breakfast). warfarin 6 mg tablet Commonly known as:  COUMADIN Take 1 Tab by mouth daily. Prescriptions Sent to Pharmacy Refills  
 levothyroxine (SYNTHROID) 137 mcg tablet 0 Sig: Take 137.5 mcg by mouth Daily (before breakfast). Class: Normal  
 Pharmacy: [address] [facility] Ph #: [phone] Route: Oral  
  
August 2018 Details Vikki Franco Tue Wed Thu Fri Sat  
     1  
  
  
  
   2  
  
6 mg See details    3  
  
6 mg  
  
   4  
  
6 mg  
  
  
  5  
  
6 mg  
  
 6  
  
6 mg  
  
   7  
  
6 mg  
  
   8  
  
6 mg  
  
   9  
  
6 mg  
  
   10  
  
6 mg  
  
   11  
  
6 mg  
  
  
  12  
  
6 mg  
  
   13  
  
6 mg  
  
   14  
  
6 mg  
  
   15  
  
6 mg  
  
   16  
  
6 mg  
  
   17  
  
6 mg  
  
   18  
  
6 mg  
  
  
  19  
  
6 mg  
  
   20  
  
6 mg  
  
   21  
  
6 mg  
  
   22  
  
6 mg  
  
   23  
  
6 mg  
  
   24  
  
6 mg  
  
   25  
  
6 mg  
  
  
  26  
  
6 mg  
  
   27  
  
6 mg  
  
   28  
  
6 mg  
  
   29  
  
6 mg  
  
   30  
  
6 mg  
  
   31 Date Details 08/02 This INR check INR: 2.8 Date of next INR:  8/30/2018 How to take your warfarin dose To take:  6 mg Take one of the 6 mg tablets. We Performed the Following AMB POC PT/INR [29323 CPT(R)] T4, FREE [id number] CPT(R)] TSH 3RD GENERATION [13966 CPT(R)] Follow-up Instructions Return in about 4 weeks (around 8/30/2018) for INR check or sooner as needed. To-Do List   
 09/27/2018 11:00 AM  
  Appointment with [facility] ROOM 2 [facility] at [facility] VASCULAR LAB ([phone]) No Prep  GENERAL INSTRUCTIONS 1. Bring any non [facility] facility films/reports pertaining to the area being studied with you on the day of appointment. 2. A written order with a valid diagnosis and Physicians signature is required for all scheduled tests. 3. Check in at registration 30 minutes before your appointment time unless you were instructed to do otherwise. Patient Instructions Return for lab only appointment for thyroid function testing on/after 8/15/18. A Healthy Lifestyle: Care Instructions Your Care Instructions A healthy lifestyle can help you feel good, stay at a healthy weight, and have plenty of energy for both work and play. A healthy lifestyle is something you can share with your whole family.  
A healthy lifestyle also can lower your risk for serious health problems, such as high blood pressure, heart disease, and diabetes. You can follow a few steps listed below to improve your health and the health of your family. Follow-up care is a key part of your treatment and safety. Be sure to make and go to all appointments, and call your doctor if you are having problems. It's also a good idea to know your test results and keep a list of the medicines you take. How can you care for yourself at home? · Do not eat too much sugar, fat, or fast foods. You can still have dessert and treats now and then. The goal is moderation. · Start small to improve your eating habits. Pay attention to portion sizes, drink less juice and soda pop, and eat more fruits and vegetables. ¨ Eat a healthy amount of food. A 3-ounce serving of meat, for example, is about the size of a deck of cards. Fill the rest of your plate with vegetables and whole grains. ¨ Limit the amount of soda and sports drinks you have every day. Drink more water when you are thirsty. ¨ Eat at least 5 servings of fruits and vegetables every day. It may seem like a lot, but it is not hard to reach this goal. A serving or helping is 1 piece of fruit, 1 cup of vegetables, or 2 cups of leafy, raw vegetables. Have an apple or some carrot sticks as an afternoon snack instead of a candy bar. Try to have fruits and/or vegetables at every meal. 
· Make exercise part of your daily routine. You may want to start with simple activities, such as walking, bicycling, or slow swimming. Try to be active 30 to 60 minutes every day. You do not need to do all 30 to 60 minutes all at once. For example, you can exercise 3 times a day for 10 or 20 minutes. Moderate exercise is safe for most people, but it is always a good idea to talk to your doctor before starting an exercise program. 
· Keep moving. Roanne Pages the lawn, work in the garden, or Plantiga. Take the stairs instead of the elevator at work. · If you smoke, quit. People who smoke have an increased risk for heart attack, stroke, cancer, and other lung illnesses. Quitting is hard, but there are ways to boost your chance of quitting tobacco for good. ¨ Use nicotine gum, patches, or lozenges. ¨ Ask your doctor about stop-smoking programs and medicines. ¨ Keep trying. In addition to reducing your risk of diseases in the future, you will notice some benefits soon after you stop using tobacco. If you have shortness of breath or asthma symptoms, they will likely get better within a few weeks after you quit. · Limit how much alcohol you drink. Moderate amounts of alcohol (up to 2 drinks a day for men, 1 drink a day for women) are okay. But drinking too much can lead to liver problems, high blood pressure, and other health problems. Family health If you have a family, there are many things you can do together to improve your health. · Eat meals together as a family as often as possible. · Eat healthy foods. This includes fruits, vegetables, lean meats and dairy, and whole grains. · Include your family in your fitness plan. Most people think of activities such as jogging or tennis as the way to fitness, but there are many ways you and your family can be more active. Anything that makes you breathe hard and gets your heart pumping is exercise. Here are some tips: 
¨ Walk to do errands or to take your child to school or the bus. ¨ Go for a family bike ride after dinner instead of watching TV. Where can you learn more? Go to http://mamie-sandrita.info/. Enter G708 in the search box to learn more about \"A Healthy Lifestyle: Care Instructions. \" Current as of: December 7, 2017 Content Version: 11.7 © 2654-7611 [employer]. Care instructions adapted under license by [employer] (which disclaims liability or warranty for this information).  If you have questions about a medical condition or this instruction, always ask your healthcare professional. [address] any warranty or liability for your use of this information. Introducing [facility] & HEALTH SERVICES! Dear Yocasta Andersen: Thank you for requesting a EventHive account. Our records indicate that you already have an active EventHive account. You can access your account anytime at [email or web address] Did you know that you can access your hospital and ER discharge instructions at any time in EventHive? You can also review all of your test results from your hospital stay or ER visit. Additional Information If you have questions, please visit the Frequently Asked Questions section of the EventHive website at [email or web address] Remember, EventHive is NOT to be used for urgent needs. For medical emergencies, dial 911. Now available from your iPhone and Android! Please provide this summary of care documentation to your next provider. Your primary care clinician is listed as Toby Pandey. If you have any questions after today's visit, please call [phone].

## 2018-08-02 NOTE — ED PROVIDER NOTES
HPI Comments: 47-year-old female with past medical history significant for recent pulmonary embolism, currently on Coumadin, presents with complaints of shortness of breath and intermittent chest pain with radiation to her neck. Compliant with Coumadin. Denies fever, chills. Denies trauma. Per records saw her hematologist yesterday for same. Denies abdominal pain, urinary complaints, pregnancy, changes in bowel habit. Former smoker Denies drug and alcohol use Primary care physician-willis Admission 7/3/18-18 for PE, dc'd on coumadin. The history is provided by the patient and medical records. Past Medical History:  
Diagnosis Date  Calculus of kidney  Depression  Environmental allergies  GERD (gastroesophageal reflux disease)  Hashimoto's thyroiditis 10/24/2011  Hashimoto's thyroiditis 10/24/2011  Hypotension  Hypothyroid 2011  Hypothyroidism  Psychiatric disorder   
 depression --2 yr old daughter  2013  Unspecified adverse effect of anesthesia   
 per patient with epidural had severe N&V and passed out Past Surgical History:  
Procedure Laterality Date  DELIVERY   11  
 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  
Problem Relation Age of Onset  Heart Disease Mother   
  miltral value prolapse  Hypertension Father  Thyroid Disease Maternal Grandmother  Diabetes Paternal Grandmother  Diabetes Maternal Aunt  Cancer Paternal Grandfather   
  throat/stomach cancer,  at age 72  Breast Cancer Other   
  maternal great grandmother Social History Social History  Marital status: LEGALLY  Spouse name: N/A  
 Number of children: N/A  
 Years of education: N/A Occupational History  Not on file. Social History Main Topics  Smoking status: Former Smoker Packs/day: 0.10 Years: 1.00 Quit date: 06/2018  Smokeless tobacco: Never Used  Alcohol use No  
 Drug use: No  
 Sexual activity: Yes  
  Partners: Male Birth control/ protection: Condom Other Topics Concern  Not on file Social History Narrative ALLERGIES: Ancef [cefazolin]; Pcn [penicillins]; Peanut; Chocolate [cocoa]; Citrus and derivatives; Marco A Chyle; Beef containing products; Chicken derived; Corn; Egg; Milk; Milk prot-turm-pepper-pineappl; Shellfish derived; Soy; and [place] Review of Systems Constitutional: Positive for fatigue. Negative for chills and fever. HENT: Negative for congestion, nosebleeds and rhinorrhea. Eyes: Negative for pain and redness. Respiratory: Positive for shortness of breath. Negative for cough. Cardiovascular: Positive for chest pain. Negative for palpitations. Gastrointestinal: Negative for abdominal pain, nausea and vomiting. Genitourinary: Negative for dysuria, frequency, vaginal bleeding and vaginal pain. Musculoskeletal: Negative for myalgias. Skin: Negative for rash and wound. Neurological: Negative for seizures, syncope and weakness. Hematological: Does not bruise/bleed easily. Psychiatric/Behavioral: Negative for agitation, confusion, dysphoric mood and suicidal ideas. The patient is not nervous/anxious. There were no vitals filed for this visit. Physical Exam  
Constitutional: She is oriented to person, place, and time. She appears well-developed and well-nourished. HENT:  
Head: Normocephalic and atraumatic. Eyes: EOM are normal. Pupils are equal, round, and reactive to light. Neck: Normal range of motion. Neck supple. No tracheal deviation present. Cardiovascular: Normal rate, regular rhythm, normal heart sounds and intact distal pulses. Pulmonary/Chest: Effort normal and breath sounds normal. No stridor. No respiratory distress. She has no wheezes. She has no rales. She exhibits no tenderness. Abdominal: Soft. Bowel sounds are normal. She exhibits no distension. There is no tenderness. There is no rebound. Musculoskeletal: Normal range of motion. She exhibits no edema or tenderness. Neurological: She is alert and oriented to person, place, and time. No cranial nerve deficit. Skin: Skin is warm and dry. No rash noted. No pallor. Psychiatric: She has a normal mood and affect. Nursing note and vitals reviewed. MDM Number of Diagnoses or Management Options Chest pain, unspecified type: Other pulmonary embolism without acute cor pulmonale, unspecified chronicity [facility]):  
Diagnosis management comments: 74-year-old female with history of recent pulmonary embolism on Coumadin presents with complaints of chest pain and shortness of breath. Patient is well-appearing, in no acute distress, hemodynamically stable, no respiratory distress, clear to auscultation bilaterally, normal room-air oxygen saturation, no increase with breathing, no leg swelling. Plan-EKG, cardiac monitor, IV fluid hydration, pregnancy test, CBC/CMP/coags, chest x-ray, cardiac enzymes. Labs and x-ray unremarkable Amount and/or Complexity of Data Reviewed Clinical lab tests: ordered and reviewed Tests in the radiology section of CPT®: ordered and reviewed Review and summarize past medical records: yes Independent visualization of images, tracings, or specimens: yes Patient Progress Patient progress: improved ED Course Procedures ED EKG interpretation: 
Rhythm: normal sinus rhythm; and regular . Rate (approx.): 84; Axis: normal; P wave: normal; QRS interval: normal ; ST/T wave: normal; no ischemia, unchanged from 7/11/2018. This EKG was interpreted by Jorge Lindquist MD,ED Provider.

## 2018-08-02 NOTE — PROGRESS NOTES
Subjective:      Michael Patel is a 24 y.o. female here for anticoagulation monitoring. Anticoagulation Information:   · Indication: Pulmonary embolism   · INR Goal: 2-3   · Current dose: Coumadin 6 mg daily  · Missed Coumadin Doses: None  · Medication Changes: no  · Dietary Changes: no     · Symptoms: taking coumadin appropriately without any bleeding. Denies gum bleeding, epistaxis, hematemesis, melena, hematochezia, hematuria, easy bruising. Evaluated at [facility] yesterday for shortness of breath and chest pain. Due to PE, her parents suggested that she be evaluated. ED workup with negative CXR, INR 2.1, EKG unchanged. States that she is feeling better today. Needs refill of levothyroxine. Current Outpatient Prescriptions   Medication Sig Dispense Refill    warfarin (COUMADIN) 6 mg tablet Take 1 Tab by mouth daily. 30 Tab 2    levothyroxine (SYNTHROID) 137 mcg tablet Take 137.5 mcg by mouth Daily (before breakfast). 30 Tab 0    citalopram (CELEXA) 40 mg tablet Take 1 Tab by mouth once over twenty-four (24) hours for 180 days.  90 Tab 1       Allergies   Allergen Reactions    Ancef [Cefazolin] Hives     Given at c/s, swelling of face/hives, tx'd with benadryl    Pcn [Penicillins] Hives     Facial edema     Peanut Hives    Chocolate [Cocoa] Hives    Citrus And Derivatives Nausea and Vomiting     Oranges only    [place] Hives    Beef Containing Products Hives    Chicken Derived Hives    Corn Hives    Egg Hives    Milk Hives    Milk Prot-Turm-Pepper-Pineappl Hives    Shellfish Derived Hives    Soy Hives    Strawberry Hives       Past Medical History:   Diagnosis Date    Calculus of kidney     Depression     Environmental allergies     GERD (gastroesophageal reflux disease)     Hashimoto's thyroiditis 10/24/2011    Hashimoto's thyroiditis 10/24/2011    Hypotension     Hypothyroid 2011    Hypothyroidism     Psychiatric disorder     depression --2 yr old daughter  6/19/2013    Unspecified adverse effect of anesthesia 2011    per patient with epidural had severe N&V and passed out       Social History   Substance Use Topics    Smoking status: Former Smoker     Packs/day: 0.10     Years: 1.00     Quit date: 06/2018    Smokeless tobacco: Never Used    Alcohol use No        Review of Systems  Pertinent items are noted in HPI. Objective:     Vitals:    08/02/18 1348   BP: 110/72  Comment: Manual   BP 1 Location: Right arm   BP Patient Position: Sitting   Pulse: 98   Resp: 18   Temp: 98 °F (36.7 °C)  Comment: . TempSrc: Oral   SpO2: 98%   Weight: 191 lb (86.6 kg)   Height: 5' 2\" (1.575 m)     General appearance - alert, well appearing and in no distress  Eyes - pupils equal and reactive, extraocular eye movements intact, sclera anicteric  Oropharyngx - mucous membranes moist, pharynx normal without lesions  Chest - clear to auscultation, no wheezes, rales or rhonchi, symmetric air entry, no tachypnea, retractions or cyanosis  Heart - normal rate, regular rhythm, normal S1, S2, no murmurs, rubs, clicks or gallops    Recent Results (from the past 12 hour(s))   AMB POC PT/INR    Collection Time: 08/02/18  1:57 PM   Result Value Ref Range    VALID INTERNAL CONTROL POC Yes     Prothrombin time (POC) 33.9 seconds    INR POC 2.8        Assessment/Plan:   Bebe Moffett is a 24 y.o. female seen for:    1. Other acute pulmonary embolism without acute cor pulmonale ([facility] [address]):  INR at goal, continue with Coumadin 6 mg daily. Return in 4 weeks for monitoring.   - AMB POC PT/INR    2. Hypothyroidism due to Hashimoto's thyroiditis: due for repeat TSH on new levothyroxine dose. She will return to have labs performed on/after 8/15/18 (6 weeks after last TSH obtained). Continue with current dose of levothyroxine and titrate as needed. - levothyroxine (SYNTHROID) 137 mcg tablet; Take 137.5 mcg by mouth Daily (before breakfast). Dispense: 30 Tab;  Refill: 0  - TSH 3RD GENERATION  - T4, FREE    I have discussed the diagnosis with the patient and the intended plan as seen in the above orders. The patient has received an after-visit summary and questions were answered concerning future plans. I have discussed medication side effects and warnings with the patient as well. Patient verbalizes understanding of plan of care and denies further questions or concerns at this time. Informed patient to return to the office if symptoms worsen or if new symptoms arise. Follow-up Disposition:  Return in about 4 weeks (around 8/30/2018) for INR check or sooner as needed.

## 2018-08-24 ENCOUNTER — OFFICE VISIT (OUTPATIENT)
Dept: FAMILY MEDICINE CLINIC | Age: 28
End: 2018-08-24

## 2018-08-24 VITALS
HEIGHT: 62 IN | WEIGHT: 191.4 LBS | DIASTOLIC BLOOD PRESSURE: 50 MMHG | SYSTOLIC BLOOD PRESSURE: 102 MMHG | HEART RATE: 76 BPM | RESPIRATION RATE: 18 BRPM | TEMPERATURE: 97.7 F | OXYGEN SATURATION: 98 % | BODY MASS INDEX: 35.22 KG/M2

## 2018-08-24 DIAGNOSIS — J02.9 SORE THROAT: ICD-10-CM

## 2018-08-24 DIAGNOSIS — J02.0 STREPTOCOCCAL PHARYNGITIS: Primary | ICD-10-CM

## 2018-08-24 PROBLEM — E66.01 SEVERE OBESITY (BMI 35.0-39.9): Status: ACTIVE | Noted: 2018-08-24

## 2018-08-24 LAB
S PYO AG THROAT QL: POSITIVE
VALID INTERNAL CONTROL?: YES

## 2018-08-24 RX ORDER — CLINDAMYCIN HYDROCHLORIDE 300 MG/1
300 CAPSULE ORAL 3 TIMES DAILY
Qty: 30 CAP | Refills: 0 | Status: SHIPPED | OUTPATIENT
Start: 2018-08-24 | End: 2018-09-03

## 2018-08-24 NOTE — PATIENT INSTRUCTIONS

## 2018-08-24 NOTE — MR AVS SNAPSHOT
[facility] 
 
 
 [address] [facility] 
[phone] Patient: Chadwick Eisenmenger MRN: [id number] :[id number] Visit Information Date & Time Provider Department Dept. Phone Encounter #  
 2018 11:00 AM Rebecca Jane Segundo Yuan [phone] [id number] Follow-up Instructions Return if symptoms worsen or fail to improve. Your Appointments 10/31/2018 11:00 AM  
ESTABLISHED PATIENT with Ileana Diego MD  
[facility] at [facility] [address]) Appt Note: Raddin, VTE fu.  
 [address] [facility], [address] [facility] [address] [address] [phone]  
  
   
 [address] [place] [address], [address] [place] [address] [facility] Upcoming Health Maintenance Date Due  
 HPV Age 9Y-34Y (1 of 3 - Female 3 Dose Series) 10/4/2003 PAP AKA CERVICAL CYTOLOGY 2018 Influenza Age 5 to Adult 2018 Pneumococcal 19-64 Highest Risk (2 of 3 - PCV13) 3/6/2019 DTaP/Tdap/Td series (2 - Td) 10/12/2025 Allergies as of 2018  Review Complete On: 2018 By: Rebecca Jane MD  
  
 Severity Noted Reaction Type Reactions Ancef [Cefazolin] High 2015    Hives Given at c/s, swelling of face/hives, tx'd with benadryl Pcn [Penicillins] High 2011   Systemic Hives Facial edema Peanut Medium 10/13/2014   Systemic Hives Chocolate [Cocoa]  2015    Hives Citrus And Derivatives  2015    Nausea and Vomiting Oranges only [place] Low 10/13/2014   Systemic Hives Beef Containing Products Low 10/13/2014   Systemic Hives Chicken Derived Low 10/13/2014   Systemic Hives [place] Low 10/13/2014   Systemic Hives Egg Low 10/13/2014   Systemic Hives Milk Low 10/13/2014   Systemic Hives Milk Prot-turm-pepper-pineappl Low 10/13/2014   Systemic Hives Shellfish Derived Low 10/13/2014   Systemic Hives Soy Low 10/13/2014   Systemic Hives [place] Low 10/13/2014   Systemic Hives Current Immunizations  Reviewed on 6/29/2018 Name Date Tdap 10/12/2015 Not reviewed this visit You Were Diagnosed With   
  
 Codes Comments Streptococcal pharyngitis    -  Primary ICD-10-CM: J02.0 ICD-9-CM: 034.0 Sore throat     ICD-10-CM: J02.9 ICD-9-CM: 746 Vitals BP Pulse Temp Resp Height(growth percentile) Weight(growth percentile) 102/50 (BP 1 Location: Left arm, BP Patient Position: Sitting) 76 97.7 °F (36.5 °C) (Oral) 18 5' 2\" (1.575 m) 191 lb 6.4 oz (86.8 kg) LMP SpO2 BMI OB Status Smoking Status 08/10/2018 98% 35.01 kg/m2 Having regular periods Former Smoker BMI and BSA Data Body Mass Index Body Surface Area 35.01 kg/m 2 1.95 m 2 Preferred Pharmacy Pharmacy Name Phone [address] [facility] [phone] Your Updated Medication List  
  
   
This list is accurate as of 8/24/18 11:42 AM.  Always use your most recent med list.  
  
  
  
  
 citalopram 40 mg tablet Commonly known as:  [place] Take 1 Tab by mouth once over twenty-four (24) hours for 180 days. clindamycin 300 mg capsule Commonly known as:  CLEOCIN Take 1 Cap by mouth three (3) times daily for 10 days. levothyroxine 137 mcg tablet Commonly known as:  SYNTHROID Take 137.5 mcg by mouth Daily (before breakfast). warfarin 6 mg tablet Commonly known as:  COUMADIN Take 1 Tab by mouth daily. Prescriptions Sent to Pharmacy Refills  
 clindamycin (CLEOCIN) 300 mg capsule 0 Sig: Take 1 Cap by mouth three (3) times daily for 10 days. Class: Normal  
 Pharmacy: [facility] [address] [facility] #: [phone] Route: Oral  
  
We Performed the Following AMB POC RAPID STREP A [62911 CPT(R)] Follow-up Instructions Return if symptoms worsen or fail to improve. To-Do List   
 09/27/2018 11:00 AM  
  Appointment with VAS ROOM 2 [facility] at [facility] VASCULAR LAB ([phone]) No Prep  GENERAL INSTRUCTIONS 1. Bring any non [facility] facility films/reports pertaining to the area being studied with you on the day of appointment. 2. A written order with a valid diagnosis and Physicians signature is required for all scheduled tests. 3. Check in at registration 30 minutes before your appointment time unless you were instructed to do otherwise. Patient Instructions Strep Throat: Care Instructions Your Care Instructions Strep throat is a bacterial infection that causes sudden, severe sore throat and fever. Strep throat, which is caused by bacteria called streptococcus, is treated with antibiotics. Sometimes a strep test is necessary to tell if the sore throat is caused by strep bacteria. Treatment can help ease symptoms and may prevent future problems. Follow-up care is a key part of your treatment and safety. Be sure to make and go to all appointments, and call your doctor if you are having problems. It's also a good idea to know your test results and keep a list of the medicines you take. How can you care for yourself at home? · Take your antibiotics as directed. Do not stop taking them just because you feel better. You need to take the full course of antibiotics. · Strep throat can spread to others until 24 hours after you begin taking antibiotics. During this time, you should avoid contact with other people at work or home, especially infants and children. Do not sneeze or cough on others, and wash your hands often. Keep your drinking glass and eating utensils separate from those of others, and wash these items well in hot, soapy water. · Gargle with warm salt water at least once each hour to help reduce swelling and make your throat feel better.  Use 1 teaspoon of salt mixed in 8 fluid ounces of warm water. · Take an over-the-counter pain medication, such as acetaminophen (Tylenol), ibuprofen (Advil, Motrin), or naproxen (Aleve). Read and follow all instructions on the label. · Try an over-the-counter anesthetic throat spray or throat lozenges, which may help relieve throat pain. · Drink plenty of fluids. Fluids may help soothe an irritated throat. Hot fluids, such as tea or soup, may help your throat feel better. · Eat soft solids and drink plenty of clear liquids. Flavored ice pops, ice cream, scrambled eggs, sherbet, and gelatin dessert (such as Jell-O) may also soothe the throat. · Get lots of rest. 
· Do not smoke, and avoid secondhand smoke. If you need help quitting, talk to your doctor about stop-smoking programs and medicines. These can increase your chances of quitting for good. · Use a vaporizer or humidifier to add moisture to the air in your bedroom. Follow the directions for cleaning the machine. When should you call for help? Call your doctor now or seek immediate medical care if: 
  · You have a new or higher fever.  
  · You have a fever with a stiff neck or severe headache.  
  · You have new or worse trouble swallowing.  
  · Your sore throat gets much worse on one side.  
  · Your pain becomes much worse on one side of your throat.  
 Watch closely for changes in your health, and be sure to contact your doctor if: 
  · You are not getting better after 2 days (48 hours).  
  · You do not get better as expected. Where can you learn more? Go to http://mamie-sandrita.info/. Enter K625 in the search box to learn more about \"Strep Throat: Care Instructions. \" Current as of: May 12, 2017 Content Version: 11.7 © 8130-9566 [employer]. Care instructions adapted under license by [employer] (which disclaims liability or warranty for this information).  If you have questions about a medical condition or this instruction, always ask your healthcare professional. [address] any warranty or liability for your use of this information. Introducing [facility]! Dear Ean: Thank you for requesting a [employer] account. Our records indicate that you already have an active [employer] account. You can access your account anytime at [email or web address] Did you know that you can access your hospital and ER discharge instructions at any time in [employer]? You can also review all of your test results from your hospital stay or ER visit. Additional Information If you have questions, please visit the Frequently Asked Questions section of the [employer] website at [email or web address] Remember, [employer] is NOT to be used for urgent needs. For medical emergencies, dial 911. Now available from your iPhone and Android! Please provide this summary of care documentation to your next provider. Your primary care clinician is listed as Sav [place]bour. If you have any questions after today's visit, please call [phone].

## 2018-08-24 NOTE — PROGRESS NOTES
Subjective:   Tanya Tovar is a 24 y.o. female presenting with sore throat, swollen glands and pain while swallowing for 2-3 days. Negative history of fevers, nausea, shortness of breath, vomiting and wheezing. Evaluation to date: none  Treatment to date: none     Current Outpatient Prescriptions   Medication Sig Dispense Refill    levothyroxine (SYNTHROID) 137 mcg tablet Take 137.5 mcg by mouth Daily (before breakfast). 30 Tab 0    warfarin (COUMADIN) 6 mg tablet Take 1 Tab by mouth daily. 30 Tab 2    citalopram (CELEXA) 40 mg tablet Take 1 Tab by mouth once over twenty-four (24) hours for 180 days. 90 Tab 1       Allergies   Allergen Reactions    Ancef [Cefazolin] Hives     Given at c/s, swelling of face/hives, tx'd with benadryl    Pcn [Penicillins] Hives     Facial edema     Peanut Hives    Chocolate [Cocoa] Hives    Citrus And Derivatives Nausea and Vomiting     Oranges only    [place] Hives    Beef Containing Products Hives    Chicken Derived Hives    Corn Hives    Egg Hives    Milk Hives    Milk Prot-Turm-Pepper-Pineappl Hives    Shellfish Derived Hives    Soy Hives    Strawberry Hives       Past Medical History:   Diagnosis Date    Calculus of kidney     Depression     Environmental allergies     GERD (gastroesophageal reflux disease)     Hashimoto's thyroiditis 10/24/2011    Hashimoto's thyroiditis 10/24/2011    Hypotension     Hypothyroid 2011    Hypothyroidism     Psychiatric disorder     depression --2 yr old daughter  2013    Unspecified adverse effect of anesthesia     per patient with epidural had severe N&V and passed out       Social History   Substance Use Topics    Smoking status: Former Smoker     Packs/day: 0.10     Years: 1.00     Quit date: 2018    Smokeless tobacco: Never Used    Alcohol use No        Review of Systems  Pertinent items are noted in HPI.     Objective:      Visit Vitals    /50 (BP 1 Location: Left arm, BP Patient Position: Sitting)    Pulse 76    Temp 97.7 °F (36.5 °C) (Oral)    Resp 18    Ht 5' 2\" (1.575 m)    Wt 191 lb 6.4 oz (86.8 kg)    LMP 08/10/2018    SpO2 98%    BMI 35.01 kg/m2      General appearance: alert, well appearing, and in no distress  Ears: bilateral TM's and external ear canals normal  Oropharynx: mucous membranes moist, pharynx normal without lesions and erythematous  Neck: bilateral symmetric anterior adenopathy  Heart: normal rate, regular rhythm, normal S1, S2, no murmurs, rubs, clicks or gallops  Lungs: clear to auscultation, no wheezes, rales or rhonchi, symmetric air entry, no tachypnea, retractions or cyanosis    Recent Results (from the past 12 hour(s))   AMB POC RAPID STREP A    Collection Time: 08/24/18 11:20 AM   Result Value Ref Range    VALID INTERNAL CONTROL POC Yes     Group A Strep Ag Positive Negative       Assessment/Plan:   Gautam Waddell is a 24 y.o. female seen for:     1. Streptococcal pharyngitis: unable to take penicillin or cephalosporins due to allergic reaction. Due to warfarin use, would avoid azithromycin as this can increase INR. Will treat with clindamycin as below. Side effects of medication discussed. - clindamycin (CLEOCIN) 300 mg capsule; Take 1 Cap by mouth three (3) times daily for 10 days. Dispense: 30 Cap; Refill: 0  - Gargle with warm saltwater,  use acetaminophen or other OTC analgesic, and take Rx fully as prescribed. - Change toothbrush in 48 hours     2. Sore throat  - AMB POC RAPID STREP A    I have discussed the diagnosis with the patient/parent and the intended plan as seen in the above orders. The patient/parent has received an after-visit summary and questions were answered concerning future plans. I have discussed medication side effects and warnings with the patient/parent as well. Informed patient to return to the office if symptoms worsen or if new symptoms arise.       Follow-up Disposition:  Return if symptoms worsen or fail to improve.

## 2018-08-24 NOTE — PROGRESS NOTES
Identified pt with two pt identifiers(name and ).     Chief Complaint   Patient presents with    Sore Throat     Complains of sore throat; hurts to swallow        Health Maintenance Due   Topic    HPV Age 9Y-34Y (1 of 3 - Female 3 Dose Series)    PAP AKA CERVICAL CYTOLOGY     Influenza Age 5 to Adult        Wt Readings from Last 3 Encounters:   18 191 lb 6.4 oz (86.8 kg)   18 191 lb (86.6 kg)   18 190 lb 7.6 oz (86.4 kg)     Temp Readings from Last 3 Encounters:   18 97.7 °F (36.5 °C) (Oral)   18 98 °F (36.7 °C) (Oral)   18 98.3 °F (36.8 °C)     BP Readings from Last 3 Encounters:   18 102/50   18 110/72   18 111/63     Pulse Readings from Last 3 Encounters:   18 76   18 98   18 80         Learning Assessment:  :     Learning Assessment 2015 2014 3/21/2014   PRIMARY LEARNER Patient Patient Patient   HIGHEST LEVEL OF EDUCATION - PRIMARY LEARNER  GRADUATED HIGH SCHOOL OR GED GRADUATED HIGH SCHOOL OR GED GRADUATED HIGH SCHOOL OR GED   BARRIERS PRIMARY LEARNER NONE NONE NONE   CO-LEARNER CAREGIVER No No -   PRIMARY LANGUAGE ENGLISH ENGLISH ENGLISH   LEARNER PREFERENCE PRIMARY READING DEMONSTRATION DEMONSTRATION   ANSWERED BY Patient patient patient   RELATIONSHIP SELF SELF SELF       Depression Screening:  :     PHQ over the last two weeks 3/6/2018   Little interest or pleasure in doing things Not at all   Feeling down, depressed, irritable, or hopeless Not at all   Total Score PHQ 2 0   Trouble falling or staying asleep, or sleeping too much -   Feeling tired or having little energy -   Poor appetite, weight loss, or overeating -   Feeling bad about yourself - or that you are a failure or have let yourself or your family down -   Trouble concentrating on things such as school, work, reading, or watching TV -   Moving or speaking so slowly that other people could have noticed; or the opposite being so fidgety that others notice - Thoughts of being better off dead, or hurting yourself in some way -   PHQ 9 Score -   How difficult have these problems made it for you to do your work, take care of your home and get along with others -       Fall Risk Assessment:  :     No flowsheet data found. Abuse Screening:  :     Abuse Screening Questionnaire 6/29/2018 10/27/2016 5/22/2014   Do you ever feel afraid of your partner? N N N   Are you in a relationship with someone who physically or mentally threatens you? N N N   Is it safe for you to go home? Y Y Y       Coordination of Care Questionnaire:  :     1) Have you been to an emergency room, urgent care clinic since your last visit? no   Hospitalized since your last visit? no             2) Have you seen or consulted any other health care providers outside of [facility] since your last visit? no  (Include any pap smears or colon screenings in this section.)    3) Do you have an Advance Directive on file? no  Are you interested in receiving information about Advance Directives? No    Reviewed record in preparation for visit and have obtained necessary documentation. Medication reconciliation up to date and corrected with patient at this time.

## 2018-09-25 ENCOUNTER — OFFICE VISIT (OUTPATIENT)
Dept: FAMILY MEDICINE CLINIC | Age: 28
End: 2018-09-25

## 2018-09-25 VITALS
DIASTOLIC BLOOD PRESSURE: 70 MMHG | WEIGHT: 192.8 LBS | HEART RATE: 81 BPM | HEIGHT: 62 IN | BODY MASS INDEX: 35.48 KG/M2 | RESPIRATION RATE: 20 BRPM | OXYGEN SATURATION: 96 % | SYSTOLIC BLOOD PRESSURE: 108 MMHG | TEMPERATURE: 97.7 F

## 2018-09-25 DIAGNOSIS — I26.99 OTHER ACUTE PULMONARY EMBOLISM WITHOUT ACUTE COR PULMONALE (HCC): Primary | ICD-10-CM

## 2018-09-25 DIAGNOSIS — R10.13 DYSPEPSIA: ICD-10-CM

## 2018-09-25 DIAGNOSIS — E03.8 HYPOTHYROIDISM DUE TO HASHIMOTO'S THYROIDITIS: ICD-10-CM

## 2018-09-25 DIAGNOSIS — E06.3 HYPOTHYROIDISM DUE TO HASHIMOTO'S THYROIDITIS: ICD-10-CM

## 2018-09-25 LAB
INR BLD: 1.8
PT POC: 21.7 SECONDS
VALID INTERNAL CONTROL?: YES

## 2018-09-25 RX ORDER — FAMOTIDINE 20 MG/1
20 TABLET, FILM COATED ORAL 2 TIMES DAILY
Qty: 60 TAB | Refills: 2 | Status: SHIPPED | OUTPATIENT
Start: 2018-09-25 | End: 2018-12-23

## 2018-09-25 RX ORDER — LEVOTHYROXINE SODIUM 137 UG/1
137.5 TABLET ORAL
Qty: 30 TAB | Refills: 5 | Status: SHIPPED | OUTPATIENT
Start: 2018-09-25 | End: 2019-05-10 | Stop reason: DRUGHIGH

## 2018-09-25 NOTE — PROGRESS NOTES
Identified pt with two pt identifiers(name and ).     Chief Complaint   Patient presents with    Cough     Complains of persistent dry cough    Chest Pain     Complains of chest pains with shortness of breath        Health Maintenance Due   Topic    HPV Age 9Y-34Y (1 of 3 - Female 3 Dose Series)    PAP AKA CERVICAL CYTOLOGY     Influenza Age 5 to Adult        Wt Readings from Last 3 Encounters:   18 192 lb 12.8 oz (87.5 kg)   18 191 lb 6.4 oz (86.8 kg)   18 191 lb (86.6 kg)     Temp Readings from Last 3 Encounters:   18 97.7 °F (36.5 °C) (Oral)   18 97.7 °F (36.5 °C) (Oral)   18 98 °F (36.7 °C) (Oral)     BP Readings from Last 3 Encounters:   18 108/70   18 102/50   18 110/72     Pulse Readings from Last 3 Encounters:   18 81   18 76   18 98         Learning Assessment:  :     Learning Assessment 2015 2014 3/21/2014   PRIMARY LEARNER Patient Patient Patient   HIGHEST LEVEL OF EDUCATION - PRIMARY LEARNER  GRADUATED HIGH SCHOOL OR GED GRADUATED HIGH SCHOOL OR GED GRADUATED HIGH SCHOOL OR GED   BARRIERS PRIMARY LEARNER NONE NONE NONE   CO-LEARNER CAREGIVER No No -   PRIMARY LANGUAGE ENGLISH ENGLISH ENGLISH   LEARNER PREFERENCE PRIMARY READING DEMONSTRATION DEMONSTRATION   ANSWERED BY Patient patient patient   RELATIONSHIP SELF SELF SELF       Depression Screening:  :     PHQ over the last two weeks 3/6/2018   Little interest or pleasure in doing things Not at all   Feeling down, depressed, irritable, or hopeless Not at all   Total Score PHQ 2 0   Trouble falling or staying asleep, or sleeping too much -   Feeling tired or having little energy -   Poor appetite, weight loss, or overeating -   Feeling bad about yourself - or that you are a failure or have let yourself or your family down -   Trouble concentrating on things such as school, work, reading, or watching TV -   Moving or speaking so slowly that other people could have noticed; or the opposite being so fidgety that others notice -   Thoughts of being better off dead, or hurting yourself in some way -   PHQ 9 Score -   How difficult have these problems made it for you to do your work, take care of your home and get along with others -       Fall Risk Assessment:  :     No flowsheet data found. Abuse Screening:  :     Abuse Screening Questionnaire 6/29/2018 10/27/2016 5/22/2014   Do you ever feel afraid of your partner? N N N   Are you in a relationship with someone who physically or mentally threatens you? N N N   Is it safe for you to go home? Y Y Y       Coordination of Care Questionnaire:  :     1) Have you been to an emergency room, urgent care clinic since your last visit? no   Hospitalized since your last visit? no             2) Have you seen or consulted any other health care providers outside of [address] [place] [address] since your last visit? no  (Include any pap smears or colon screenings in this section.)    3) Do you have an Advance Directive on file? no  Are you interested in receiving information about Advance Directives? no.    Reviewed record in preparation for visit and have obtained necessary documentation. Medication reconciliation up to date and corrected with patient at this time.

## 2018-09-25 NOTE — PROGRESS NOTES
Subjective:      Janett Tee is a 24 y.o. female here for anticoagulation monitoring. Anticoagulation Information:   · Indication: Pulmonary embolism   · INR Goal: 2-3   · Current dose: Coumadin 6 mg daily  · Missed Coumadin Doses: None  · Medication Changes: no  · Dietary Changes: no     · Symptoms: taking coumadin appropriately without any bleeding. Denies gum bleeding, epistaxis, hematemesis, melena, hematochezia, hematuria, easy bruising. Complains of cough x 3 weeks. Intermittently productive of clear phlegm. States that she sometimes feels as though her lungs are full of water during coughing fits. Associated with left chest tightness. Denies fever, chills, nausea, vomiting, LE edema, postnasal drainage. She does have reflux symptoms. Hypothyroidism: compliant with levothyroxine. States that she is less fatigued. No reported temperature intolerance, change in bowel habits. Current Outpatient Prescriptions   Medication Sig Dispense Refill    levothyroxine (SYNTHROID) 137 mcg tablet Take 137.5 mcg by mouth Daily (before breakfast). 30 Tab 0    warfarin (COUMADIN) 6 mg tablet Take 1 Tab by mouth daily. 30 Tab 2    citalopram (CELEXA) 40 mg tablet Take 1 Tab by mouth once over twenty-four (24) hours for 180 days.  90 Tab 1       Allergies   Allergen Reactions    Ancef [Cefazolin] Hives     Given at c/s, swelling of face/hives, tx'd with benadryl    Pcn [Penicillins] Hives     Facial edema     Peanut Hives    Chocolate [Cocoa] Hives    Citrus And Derivatives Nausea and Vomiting     Oranges only    [place] Hives    Beef Containing Products Hives    Chicken Derived Hives    Corn Hives    Egg Hives    Milk Hives    Milk Prot-Turm-Pepper-Pineappl Hives    Shellfish Derived Hives    Soy Hives    Strawberry Hives       Past Medical History:   Diagnosis Date    Calculus of kidney     Depression     Environmental allergies     GERD (gastroesophageal reflux disease)     Hashimoto's thyroiditis 10/24/2011    Hashimoto's thyroiditis 10/24/2011    Hypotension     Hypothyroid 2011    Hypothyroidism     Psychiatric disorder     depression --2 yr old daughter  2013    Unspecified adverse effect of anesthesia     per patient with epidural had severe N&V and passed out       Social History   Substance Use Topics    Smoking status: Former Smoker     Packs/day: 0.10     Years: 1.00     Quit date: 2018    Smokeless tobacco: Never Used    Alcohol use No        Review of Systems  Pertinent items are noted in HPI. Objective:     Vitals:    18 0845   BP: 108/70   BP 1 Location: Right arm   BP Patient Position: Sitting   Pulse: 81   Resp: 20   Temp: 97.7 °F (36.5 °C)   TempSrc: Oral   SpO2: 96%   Weight: 192 lb 12.8 oz (87.5 kg)   Height: 5' 2\" (1.575 m)     General appearance - alert, well appearing and in no distress  Chest - clear to auscultation, no wheezes, rales or rhonchi, symmetric air entry, no tachypnea, retractions or cyanosis  Heart - normal rate, regular rhythm, normal S1, S2, no murmurs, rubs, clicks or gallops    Recent Results (from the past 12 hour(s))   AMB POC PT/INR    Collection Time: 18  8:45 AM   Result Value Ref Range    VALID INTERNAL CONTROL POC Yes     Prothrombin time (POC) 21.7 seconds    INR POC 1.8        Assessment/Plan:   Stan Radford is a 24 y.o. female seen for:    1. Other acute pulmonary embolism without acute cor pulmonale ([facility] [address]): INR not at goal. Continue with current Coumadin 6 mg daily and return in 1 week for INR check. If INR remains subtherpeutic, will need med titration.   - AMB POC PT/INR    2. Hypothyroidism due to Hashimoto's thyroiditis: stable, check labs. Continue with current dose of levothyroxine at this time.   - TSH 3RD GENERATION  - T4, FREE  - levothyroxine (SYNTHROID) 137 mcg tablet; Take 137.5 mcg by mouth Daily (before breakfast). Dispense: 30 Tab; Refill: 5    3.  Dyspepsia: question if this may be contributing to cough. Trial of Pepcid therapy as below. - famotidine (PEPCID) 20 mg tablet; Take 1 Tab by mouth two (2) times a day. Indications: Dyspepsia  Dispense: 60 Tab; Refill: 2    I have discussed the diagnosis with the patient and the intended plan as seen in the above orders. The patient has received an after-visit summary and questions were answered concerning future plans. I have discussed medication side effects and warnings with the patient as well. Patient verbalizes understanding of plan of care and denies further questions or concerns at this time. Informed patient to return to the office if symptoms worsen or if new symptoms arise. Follow-up Disposition:  Return in about 1 week (around 10/2/2018) for INR check.

## 2018-09-25 NOTE — PATIENT INSTRUCTIONS
Psychiatry Offices:     [facility] MATT  [address] [facility], [facility] [address]  Phone: [phone]    [address] [facility] [address], [address] [facility] [address]  Phone: [phone]    [facility] - Dr. Magui Edwards [address], [place], [address]  Phone: [phone]    Jaclyn Joyner, Dr. Adelfo Ferguson   [address] [facility]. [address], [place], [address]  Phone [phone] [facility]  [address] [facility], [address] [place] [address], [address] [facility]  Phone: [phone]    Chasidy Santos [place]   [address] [facility], [place], [address]  Phone: [phone]          A Healthy Lifestyle: Care Instructions  Your Care Instructions    A healthy lifestyle can help you feel good, stay at a healthy weight, and have plenty of energy for both work and play. A healthy lifestyle is something you can share with your whole family. A healthy lifestyle also can lower your risk for serious health problems, such as high blood pressure, heart disease, and diabetes. You can follow a few steps listed below to improve your health and the health of your family. Follow-up care is a key part of your treatment and safety. Be sure to make and go to all appointments, and call your doctor if you are having problems. It's also a good idea to know your test results and keep a list of the medicines you take. How can you care for yourself at home? · Do not eat too much sugar, fat, or fast foods. You can still have dessert and treats now and then. The goal is moderation. · Start small to improve your eating habits. Pay attention to portion sizes, drink less juice and soda pop, and eat more fruits and vegetables. ¨ Eat a healthy amount of food.  A 3-ounce serving of meat, for example, is about the size of a deck of cards. Fill the rest of your plate with vegetables and whole grains. ¨ Limit the amount of soda and sports drinks you have every day. Drink more water when you are thirsty. ¨ Eat at least 5 servings of fruits and vegetables every day. It may seem like a lot, but it is not hard to reach this goal. A serving or helping is 1 piece of fruit, 1 cup of vegetables, or 2 cups of leafy, raw vegetables. Have an apple or some carrot sticks as an afternoon snack instead of a candy bar. Try to have fruits and/or vegetables at every meal.  · Make exercise part of your daily routine. You may want to start with simple activities, such as walking, bicycling, or slow swimming. Try to be active 30 to 60 minutes every day. You do not need to do all 30 to 60 minutes all at once. For example, you can exercise 3 times a day for 10 or 20 minutes. Moderate exercise is safe for most people, but it is always a good idea to talk to your doctor before starting an exercise program.  · Keep moving. Truman Bonds the lawn, work in the garden, or [employer]. Take the stairs instead of the elevator at work. · If you smoke, quit. People who smoke have an increased risk for heart attack, stroke, cancer, and other lung illnesses. Quitting is hard, but there are ways to boost your chance of quitting tobacco for good. ¨ Use nicotine gum, patches, or lozenges. ¨ Ask your doctor about stop-smoking programs and medicines. ¨ Keep trying. In addition to reducing your risk of diseases in the future, you will notice some benefits soon after you stop using tobacco. If you have shortness of breath or asthma symptoms, they will likely get better within a few weeks after you quit. · Limit how much alcohol you drink. Moderate amounts of alcohol (up to 2 drinks a day for men, 1 drink a day for women) are okay. But drinking too much can lead to liver problems, high blood pressure, and other health problems.   Family health  If you have a family, there are many things you can do together to improve your health. · Eat meals together as a family as often as possible. · Eat healthy foods. This includes fruits, vegetables, lean meats and dairy, and whole grains. · Include your family in your fitness plan. Most people think of activities such as jogging or tennis as the way to fitness, but there are many ways you and your family can be more active. Anything that makes you breathe hard and gets your heart pumping is exercise. Here are some tips:  ¨ Walk to do errands or to take your child to school or the bus. ¨ Go for a family bike ride after dinner instead of watching TV. Where can you learn more? Go to http://mamie-sandrita.info/. Enter K822 in the search box to learn more about \"A Healthy Lifestyle: Care Instructions. \"  Current as of: December 7, 2017  Content Version: 11.7  © 3252-5437 [employer], [employer]. Care instructions adapted under license by [employer] (which disclaims liability or warranty for this information). If you have questions about a medical condition or this instruction, always ask your healthcare professional. [address] any warranty or liability for your use of this information.

## 2018-09-25 NOTE — MR AVS SNAPSHOT
[address] [facility] 
 
 
 [address] [facility] 
[phone] Patient: Marya Shaw MRN: [id number] :1990 Visit Information Date & Time Provider Department Dept. Phone Encounter #  
 2018  8:15 AM Elisabeth Ruelas Segundo Yuan [phone] [id number] Follow-up Instructions Return in about 1 week (around 10/2/2018) for INR check. Your Appointments 10/31/2018 11:00 AM  
ESTABLISHED PATIENT with Orly Solorio MD  
[address] at [facility]) Appt Note: [place], VTE fu.  
 [address] [facility], [address] [facility] [address] [address]  
[phone]  
  
   
 [address] [facility] [address] [facility] Upcoming Health Maintenance Date Due  
 HPV Age 9Y-34Y (1 of 3 - Female 3 Dose Series) 10/4/2003 PAP AKA CERVICAL CYTOLOGY 2018 Influenza Age 5 to Adult 2018 Pneumococcal 19-64 Highest Risk (2 of 3 - PCV13) 3/6/2019 DTaP/Tdap/Td series (2 - Td) 10/12/2025 Allergies as of 2018  Review Complete On: 2018 By: Elisabeth Ruelas MD  
  
 Severity Noted Reaction Type Reactions Ancef [Cefazolin] High 2015    Hives Given at c/s, swelling of face/hives, tx'd with benadryl Pcn [Penicillins] High 2011   Systemic Hives Facial edema Peanut Medium 10/13/2014   Systemic Hives Chocolate [Cocoa]  2015    Hives Citrus And Derivatives  2015    Nausea and Vomiting Oranges only [place] Low 10/13/2014   Systemic Hives Beef Containing Products Low 10/13/2014   Systemic Hives Chicken Derived Low 10/13/2014   Systemic Hives [place] Low 10/13/2014   Systemic Hives Egg Low 10/13/2014   Systemic Hives Milk Low 10/13/2014   Systemic Hives Milk Prot-turm-pepper-pineappl Low 10/13/2014   Systemic Hives Shellfish Derived Low 10/13/2014   Systemic Hives Soy Low 10/13/2014   Systemic Hives [place] Low 10/13/2014   Systemic Hives Current Immunizations  Reviewed on 6/29/2018 Name Date Tdap 10/12/2015 Not reviewed this visit You Were Diagnosed With   
  
 Codes Comments Other acute pulmonary embolism without acute cor pulmonale (HCC)    -  Primary ICD-10-CM: I26.99 
ICD-9-CM: 415.19 Hypothyroidism due to Hashimoto's thyroiditis     ICD-10-CM: E03.8, E06.3 ICD-9-CM: 244.8, 245.2 Dyspepsia     ICD-10-CM: R10.13 ICD-9-CM: 536.8 Vitals BP Pulse Temp Resp Height(growth percentile) Weight(growth percentile) 108/70 (BP 1 Location: Right arm, BP Patient Position: Sitting) 81 97.7 °F (36.5 °C) (Oral) 20 5' 2\" (1.575 m) 192 lb 12.8 oz (87.5 kg) LMP SpO2 BMI OB Status Smoking Status 09/22/2018 96% 35.26 kg/m2 Having regular periods Former Smoker BMI and BSA Data Body Mass Index Body Surface Area  
 35.26 kg/m 2 1.96 m 2 Preferred Pharmacy Pharmacy Name Phone Umair [facility] [address] [facility] [phone] Your Updated Medication List  
  
   
This list is accurate as of 9/25/18  9:12 AM.  Always use your most recent med list.  
  
  
  
  
 citalopram 40 mg tablet Commonly known as:  Bree Honey Take 1 Tab by mouth once over twenty-four (24) hours for 180 days. famotidine 20 mg tablet Commonly known as:  PEPCID Take 1 Tab by mouth two (2) times a day. Indications: Dyspepsia  
  
 levothyroxine 137 mcg tablet Commonly known as:  SYNTHROID Take 137.5 mcg by mouth Daily (before breakfast). warfarin 6 mg tablet Commonly known as:  COUMADIN Take 1 Tab by mouth daily. Prescriptions Sent to Pharmacy Refills  
 famotidine (PEPCID) 20 mg tablet 2 Sig: Take 1 Tab by mouth two (2) times a day. Indications: Dyspepsia Class: Normal  
 Pharmacy: [address] [facility] #: [phone] Route: Oral  
 levothyroxine (SYNTHROID) 137 mcg tablet 5 Sig: Take 137.5 mcg by mouth Daily (before breakfast). Class: Normal  
 Pharmacy: [facility] [address] GIACOMO NAVA [address] [place] [address] #: [phone] Route: Oral  
  
September 2018 Details Sun Mon Tue Wed Thu Fri Sat  
        1  
  
  
  
  
  2  
  
  
  
   3  
  
  
  
   4  
  
  
  
   5  
  
  
  
   6  
  
  
  
   7  
  
  
  
   8  
  
  
  
  
  9  
  
  
  
   10  
  
  
  
   11  
  
  
  
   12  
  
  
  
   13  
  
  
  
   14  
  
  
  
   15  
  
  
  
  
  16  
  
  
  
   17  
  
  
  
   18  
  
  
  
   19  
  
  
  
   20  
  
  
  
   21  
  
  
  
   22  
  
  
  
  
  23  
  
  
  
   24  
  
  
  
   25  
  
6 mg See details 26  
  
6 mg  
  
   27  
  
6 mg  
  
   28  
  
6 mg  
  
   29  
  
6 mg  
  
  
  30  
  
6 mg Date Details 09/25 This INR check INR: 1.8! How to take your warfarin dose To take:  6 mg Take one of the 6 mg tablets. October 2018 Details Davida Buchanan Tue Wed Thu Fri Sat 1  
  
6 mg 2  
  
6 mg  
  
   3  
  
  
  
   4  
  
  
  
   5  
  
  
  
   6  
  
  
  
  
  7  
  
  
  
   8  
  
  
  
   9  
  
  
  
   10  
  
  
  
   11  
  
  
  
   12  
  
  
  
   13  
  
  
  
  
  14  
  
  
  
   15  
  
  
  
   16  
  
  
  
   17  
  
  
  
   18  
  
  
  
   19  
  
  
  
   20  
  
  
  
  
  21  
  
  
  
   22  
  
  
  
   23  
  
  
  
   24  
  
  
  
   25  
  
  
  
   26  
  
  
  
   27  
  
  
  
  
  28  
  
  
  
   29  
  
  
  
   30  
  
  
  
   31  
  
  
  
     
 Date Details No additional details Date of next INR:  10/2/2018 How to take your warfarin dose To take:  6 mg Take one of the 6 mg tablets. We Performed the Following AMB POC PT/INR [70609 CPT(R)] T4, FREE [id number] CPT(R)] TSH 3RD GENERATION [09565 CPT(R)] Follow-up Instructions Return in about 1 week (around 10/2/2018) for INR check. To-Do List   
 09/27/2018 11:00 AM  
  Appointment with VAS ROOM 2 [facility] at [facility] VASCULAR LAB ([phone]) No Prep  GENERAL INSTRUCTIONS 1. Bring any non [facility] facility films/reports pertaining to the area being studied with you on the day of appointment. 2. A written order with a valid diagnosis and Physicians signature is required for all scheduled tests. 3. Check in at registration 30 minutes before your appointment time unless you were instructed to do otherwise. Patient Instructions Psychiatry Offices:  
 
[address] [facility] Office [address] [facility], 1 [employer], [employer] [address] Phone: [phone] [place] [address] Office [address] [facility] 135, [address] [place] [address] Phone: [phone] [facility]  Dr. Obey Edwards [address], [address] [employer], [employer] [address] Phone: [phone] Cris Johnson, Dr. Luis Dasilva [address] [facility] 97, 1 [employer], [employer] Phone [phone] [address] [place] [address] 709 [facility], [address] [place] [address], 1 [employer], [address] [facility] Phone: [phone] L[address] J[address] 1000 [facility], [address] [employer], [employer] Phone: [phone] A Healthy Lifestyle: Care Instructions Your Care Instructions A healthy lifestyle can help you feel good, stay at a healthy weight, and have plenty of energy for both work and play. A healthy lifestyle is something you can share with your whole family. A healthy lifestyle also can lower your risk for serious health problems, such as high blood pressure, heart disease, and diabetes. You can follow a few steps listed below to improve your health and the health of your family. Follow-up care is a key part of your treatment and safety. Be sure to make and go to all appointments, and call your doctor if you are having problems. It's also a good idea to know your test results and keep a list of the medicines you take. How can you care for yourself at home? · Do not eat too much sugar, fat, or fast foods. You can still have dessert and treats now and then. The goal is moderation. · Start small to improve your eating habits. Pay attention to portion sizes, drink less juice and soda pop, and eat more fruits and vegetables. ¨ Eat a healthy amount of food. A 3-ounce serving of meat, for example, is about the size of a deck of cards. Fill the rest of your plate with vegetables and whole grains. ¨ Limit the amount of soda and sports drinks you have every day. Drink more water when you are thirsty. ¨ Eat at least 5 servings of fruits and vegetables every day. It may seem like a lot, but it is not hard to reach this goal. A serving or helping is 1 piece of fruit, 1 cup of vegetables, or 2 cups of leafy, raw vegetables. Have an apple or some carrot sticks as an afternoon snack instead of a candy bar. Try to have fruits and/or vegetables at every meal. 
· Make exercise part of your daily routine. You may want to start with simple activities, such as walking, bicycling, or slow swimming. Try to be active 30 to 60 minutes every day. You do not need to do all 30 to 60 minutes all at once. For example, you can exercise 3 times a day for 10 or 20 minutes. Moderate exercise is safe for most people, but it is always a good idea to talk to your doctor before starting an exercise program. 
· Keep moving. Justin Rondon the lawn, work in the garden, or [employer]. Take the stairs instead of the elevator at work. · If you smoke, quit. People who smoke have an increased risk for heart attack, stroke, cancer, and other lung illnesses.  Quitting is hard, but there are ways to boost your chance of quitting tobacco for good. ¨ Use nicotine gum, patches, or lozenges. ¨ Ask your doctor about stop-smoking programs and medicines. ¨ Keep trying. In addition to reducing your risk of diseases in the future, you will notice some benefits soon after you stop using tobacco. If you have shortness of breath or asthma symptoms, they will likely get better within a few weeks after you quit. · Limit how much alcohol you drink. Moderate amounts of alcohol (up to 2 drinks a day for men, 1 drink a day for women) are okay. But drinking too much can lead to liver problems, high blood pressure, and other health problems. Family health If you have a family, there are many things you can do together to improve your health. · Eat meals together as a family as often as possible. · Eat healthy foods. This includes fruits, vegetables, lean meats and dairy, and whole grains. · Include your family in your fitness plan. Most people think of activities such as jogging or tennis as the way to fitness, but there are many ways you and your family can be more active. Anything that makes you breathe hard and gets your heart pumping is exercise. Here are some tips: 
¨ Walk to do errands or to take your child to school or the bus. ¨ Go for a family bike ride after dinner instead of watching TV. Where can you learn more? Go to http://mamie-sandrita.info/. Enter M693 in the search box to learn more about \"A Healthy Lifestyle: Care Instructions. \" Current as of: December 7, 2017 Content Version: 11.7 © 9431-1861 TSO[employer]. Care instructions adapted under license by [employer] (which disclaims liability or warranty for this information). If you have questions about a medical condition or this instruction, always ask your healthcare professional. [place] [address] any warranty or liability for your use of this information. Introducing hospitals & HEALTH SERVICES! Dear Ean: Thank you for requesting a [employer] account. Our records indicate that you already have an active [employer] account. You can access your account anytime at [email or web address] Did you know that you can access your hospital and ER discharge instructions at any time in [employer]? You can also review all of your test results from your hospital stay or ER visit. Additional Information If you have questions, please visit the Frequently Asked Questions section of the [employer] website at [email or web address] Remember, [employer]haus is NOT to be used for urgent needs. For medical emergencies, dial 911. Now available from your iPhone and Android! Please provide this summary of care documentation to your next provider. Your primary care clinician is listed as Heflin Bearded. If you have any questions after today's visit, please call [phone].

## 2018-09-26 LAB
T4 FREE SERPL-MCNC: 0.97 NG/DL (ref 0.82–1.77)
TSH SERPL DL<=0.005 MIU/L-ACNC: 0.67 UIU/ML (ref 0.45–4.5)

## 2018-09-27 ENCOUNTER — HOSPITAL ENCOUNTER (OUTPATIENT)
Dept: VASCULAR SURGERY | Age: 28
Discharge: HOME | End: 2018-09-27
Attending: INTERNAL MEDICINE
Payer: MEDICAID

## 2018-09-27 DIAGNOSIS — I26.99 OTHER ACUTE PULMONARY EMBOLISM WITHOUT ACUTE COR PULMONALE (HCC): ICD-10-CM

## 2018-09-27 PROCEDURE — 93970 EXTREMITY STUDY: CPT

## 2018-09-27 NOTE — PROCEDURES
[address]  *** FINAL REPORT ***    Name: Valentin Jean  MRN: [id number]    Outpatient  : 04 Oct 1990  HIS Order #: [id number]  [address] [facility] Visit #: [id number]  Date: 27 Sep 2018    TYPE OF TEST: Peripheral Venous Testing    REASON FOR TEST  known PE    Right Leg:-  Deep venous thrombosis:           No  Superficial venous thrombosis:    No  Deep venous insufficiency:        No  Superficial venous insufficiency: No    Left Leg:-  Deep venous thrombosis:           No  Superficial venous thrombosis:    No  Deep venous insufficiency:        No  Superficial venous insufficiency: No      INTERPRETATION/FINDINGS  PROCEDURE:  BILATERAL LE VENOUS DUPLEX. Evaluation of lower extremity veins with ultrasound (B-mode imaging,  pulsed Doppler, color Doppler). Includes the common femoral, deep  femoral, femoral, popliteal, posterior tibial, peroneal, and great  saphenous veins. FINDINGS:  Alley  scale and color flow duplex images of the veins in  both lower extremities demonstrate normal compressibility, spontaneous   and augmented flow profiles, and absence of filling defects  throughout the deep and superficial veins in both lower extremities. CONCLUSION: Bilateral lower extremity venous duplex negative for deep  venous thrombosis or thrombophlebitis. ADDITIONAL COMMENTS    I have personally reviewed the data relevant to the interpretation of  this  study. TECHNOLOGIST: Judge Medardo RVT  Signed: 2018 11:55 AM    PHYSICIAN: Sohan Borja.  Bessie Kay MD  Signed: 2018 07:08 PM

## 2018-10-01 ENCOUNTER — TELEPHONE (OUTPATIENT)
Dept: ONCOLOGY | Age: 28
End: 2018-10-01

## 2018-10-09 ENCOUNTER — OFFICE VISIT (OUTPATIENT)
Dept: FAMILY MEDICINE CLINIC | Age: 28
End: 2018-10-09

## 2018-10-09 VITALS
HEIGHT: 62 IN | OXYGEN SATURATION: 98 % | RESPIRATION RATE: 18 BRPM | SYSTOLIC BLOOD PRESSURE: 118 MMHG | TEMPERATURE: 97.7 F | BODY MASS INDEX: 34.41 KG/M2 | HEART RATE: 78 BPM | WEIGHT: 187 LBS | DIASTOLIC BLOOD PRESSURE: 72 MMHG

## 2018-10-09 DIAGNOSIS — I26.99 OTHER ACUTE PULMONARY EMBOLISM WITHOUT ACUTE COR PULMONALE (HCC): Primary | ICD-10-CM

## 2018-10-09 LAB
INR BLD: 2.6
PT POC: 31.6 SECONDS
VALID INTERNAL CONTROL?: YES

## 2018-10-09 NOTE — PROGRESS NOTES
Identified pt with two pt identifiers(name and ).     Chief Complaint   Patient presents with    Anticoagulation        Health Maintenance Due   Topic    HPV Age 9Y-34Y (1 of 3 - Female 3 Dose Series)    PAP AKA CERVICAL CYTOLOGY     Influenza Age 5 to Adult        Wt Readings from Last 3 Encounters:   10/09/18 187 lb (84.8 kg)   18 192 lb 12.8 oz (87.5 kg)   18 191 lb 6.4 oz (86.8 kg)     Temp Readings from Last 3 Encounters:   18 97.7 °F (36.5 °C) (Oral)   18 97.7 °F (36.5 °C) (Oral)     BP Readings from Last 3 Encounters:   18 108/70   18 102/50   18 110/72     Pulse Readings from Last 3 Encounters:   18 81   18 76   18 98         Learning Assessment:  :     Learning Assessment 2015 2014 3/21/2014   PRIMARY LEARNER Patient Patient Patient   HIGHEST LEVEL OF EDUCATION - PRIMARY LEARNER  GRADUATED HIGH SCHOOL OR GED GRADUATED HIGH SCHOOL OR GED GRADUATED HIGH SCHOOL OR GED   BARRIERS PRIMARY LEARNER NONE NONE NONE   CO-LEARNER CAREGIVER No No -   PRIMARY LANGUAGE ENGLISH ENGLISH ENGLISH   LEARNER PREFERENCE PRIMARY READING DEMONSTRATION DEMONSTRATION   ANSWERED BY Patient patient patient   RELATIONSHIP SELF SELF SELF       Depression Screening:  :     PHQ over the last two weeks 3/6/2018   Little interest or pleasure in doing things Not at all   Feeling down, depressed, irritable, or hopeless Not at all   Total Score PHQ 2 0   Trouble falling or staying asleep, or sleeping too much -   Feeling tired or having little energy -   Poor appetite, weight loss, or overeating -   Feeling bad about yourself - or that you are a failure or have let yourself or your family down -   Trouble concentrating on things such as school, work, reading, or watching TV -   Moving or speaking so slowly that other people could have noticed; or the opposite being so fidgety that others notice -   Thoughts of being better off dead, or hurting yourself in some way - PHQ 9 Score -   How difficult have these problems made it for you to do your work, take care of your home and get along with others -       Fall Risk Assessment:  :     No flowsheet data found. Abuse Screening:  :     Abuse Screening Questionnaire 6/29/2018 10/27/2016 5/22/2014   Do you ever feel afraid of your partner? N N N   Are you in a relationship with someone who physically or mentally threatens you? N N N   Is it safe for you to go home? Y Y Y       Coordination of Care Questionnaire:  :     1) Have you been to an emergency room, urgent care clinic since your last visit? no   Hospitalized since your last visit? no             2) Have you seen or consulted any other health care providers outside of [facility] since your last visit? no  (Include any pap smears or colon screenings in this section.)    3) Do you have an Advance Directive on file? no  Are you interested in receiving information about Advance Directives? no    Reviewed record in preparation for visit and have obtained necessary documentation. Medication reconciliation up to date and corrected with patient at this time.

## 2018-10-09 NOTE — PATIENT INSTRUCTIONS

## 2018-10-09 NOTE — PROGRESS NOTES
Subjective:      Mil Holt is a 34 y.o. female here for anticoagulation monitoring. Anticoagulation Information:   · Indication: Pulmonary embolism   · INR Goal: 2-3   · Current dose: Coumadin 6 mg daily  · Missed Coumadin Doses: None  · Medication Changes: no  · Dietary Changes: no     · Symptoms: taking coumadin appropriately without any bleeding. Denies gum bleeding, epistaxis, hematemesis, melena, hematochezia, hematuria, easy bruising. She has established care with [address] [place] [address]. She has been started on a new medication which is awaiting insurance approval. She does not recall name of medication. She has been diagnosed with PTSD, dissociative disorder, MDD, anxiety, OCD. She has follow up next month. Current Outpatient Prescriptions   Medication Sig Dispense Refill    famotidine (PEPCID) 20 mg tablet Take 1 Tab by mouth two (2) times a day. Indications: Dyspepsia 60 Tab 2    levothyroxine (SYNTHROID) 137 mcg tablet Take 137.5 mcg by mouth Daily (before breakfast). 30 Tab 5    warfarin (COUMADIN) 6 mg tablet Take 1 Tab by mouth daily. 30 Tab 2    citalopram (CELEXA) 40 mg tablet Take 1 Tab by mouth once over twenty-four (24) hours for 180 days.  90 Tab 1       Allergies   Allergen Reactions    Ancef [Cefazolin] Hives     Given at c/s, swelling of face/hives, tx'd with benadryl    Pcn [Penicillins] Hives     Facial edema     Peanut Hives    Chocolate [Cocoa] Hives    Citrus And Derivatives Nausea and Vomiting     Oranges only    [place] Hives    Beef Containing Products Hives    Chicken Derived Hives    Corn Hives    Egg Hives    Milk Hives    Milk Prot-Turm-Pepper-Pineappl Hives    Shellfish Derived Hives    Soy Hives    Strawberry Hives       Past Medical History:   Diagnosis Date    Anxiety     Calculus of kidney     Depression     Dissocial personality disorder (HCC)     Environmental allergies     GERD (gastroesophageal reflux disease)     Hashimoto's thyroiditis 10/24/2011    Hashimoto's thyroiditis 10/24/2011    Hypotension     Hypothyroid 2011    Hypothyroidism     MDD (major depressive disorder)     OCD (obsessive compulsive disorder)     Psychiatric disorder     depression --2 yr old daughter  2013    PTSD (post-traumatic stress disorder)     Unspecified adverse effect of anesthesia     per patient with epidural had severe N&V and passed out       Social History   Substance Use Topics    Smoking status: Former Smoker     Packs/day: 0.10     Years: 1.00     Quit date: 2018    Smokeless tobacco: Never Used    Alcohol use No        Review of Systems  Pertinent items are noted in HPI. Objective:     Vitals:    10/09/18 1506   BP: 118/72  Comment: Manual   BP 1 Location: Right arm   BP Patient Position: Sitting   Pulse: 78   Resp: 18   Temp: 97.7 °F (36.5 °C)  Comment: . TempSrc: Oral   SpO2: 98%   Weight: 187 lb (84.8 kg)   Height: 5' 2\" (1.575 m)     General appearance - alert, well appearing and in no distress  Chest - clear to auscultation, no wheezes, rales or rhonchi, symmetric air entry, no tachypnea, retractions or cyanosis  Heart - normal rate, regular rhythm, normal S1, S2, no murmurs, rubs, clicks or gallops    Recent Results (from the past 12 hour(s))   AMB POC PT/INR    Collection Time: 10/09/18  3:11 PM   Result Value Ref Range    VALID INTERNAL CONTROL POC Yes     Prothrombin time (POC) 31.6 seconds    INR POC 2.6        Assessment/Plan:   Rocio Vernon is a 34 y.o. female seen for:    1. Other acute pulmonary embolism without acute cor pulmonale ([facility] [address]): INR at goal. Continue with current Coumadin 6 mg daily. Continue until seen by Dr. Adilson Guillory on 10/31/18.   - AMB POC PT/INR    I have discussed the diagnosis with the patient and the intended plan as seen in the above orders. The patient has received an after-visit summary and questions were answered concerning future plans.  I have discussed medication side effects and warnings with the patient as well. Patient verbalizes understanding of plan of care and denies further questions or concerns at this time. Informed patient to return to the office if symptoms worsen or if new symptoms arise. Follow-up Disposition:  Return in about 4 weeks (around 11/6/2018) for anticoagulation monitoring (if needed).

## 2018-10-09 NOTE — MR AVS SNAPSHOT
[address] [facility] [address] [facility] 
[phone] Patient: Shani Sen MRN: [id number] :1990 Visit Information Date & Time Provider Department Dept. Phone Encounter #  
 10/9/2018  3:00 PM Mehdi Canchola Segundo Yuan [phone] [id number] Follow-up Instructions Return in about 4 weeks (around 2018) for anticoagulation monitoring (if needed). Your Appointments 10/31/2018 11:00 AM  
ESTABLISHED PATIENT with Paco Zamarripa MD  
[address]ong at [address] [place] [address]) Appt Note: MARYJANE Bolaños fu.  
 [address] [facility], [address] [facility] 99 [id number] [phone]  
  
   
 [address] [facility], [address] [facility] Upcoming Health Maintenance Date Due  
 HPV Age 9Y-34Y (1 of 3 - Female 3 Dose Series) 10/4/2003 PAP AKA CERVICAL CYTOLOGY 2018 Influenza Age 5 to Adult 10/1/2019* Pneumococcal 19-64 Highest Risk (2 of 3 - PCV13) 3/6/2019 DTaP/Tdap/Td series (2 - Td) 10/12/2025 *Topic was postponed. The date shown is not the original due date. Allergies as of 10/9/2018  Review Complete On: 10/9/2018 By: Mehdi Canchola MD  
  
 Severity Noted Reaction Type Reactions Ancef [Cefazolin] High 2015    Hives Given at c/s, swelling of face/hives, tx'd with benadryl Pcn [Penicillins] High 2011   Systemic Hives Facial edema Peanut Medium 10/13/2014   Systemic Hives Chocolate [Cocoa]  2015    Hives Citrus And Derivatives  2015    Nausea and Vomiting Oranges only [place] Low 10/13/2014   Systemic Hives Beef Containing Products Low 10/13/2014   Systemic Hives Chicken Derived Low 10/13/2014   Systemic Hives [place] Low 10/13/2014   Systemic Hives Egg Low 10/13/2014   Systemic Hives Milk Low 10/13/2014   Systemic Hives Milk Prot-turm-pepper-pineappl Low 10/13/2014   Systemic Hives Shellfish Derived Low 10/13/2014   Systemic Hives Soy Low 10/13/2014   Systemic Hives [place] Low 10/13/2014   Systemic Hives Current Immunizations  Reviewed on 6/29/2018 Name Date Tdap 10/12/2015 Not reviewed this visit You Were Diagnosed With   
  
 Codes Comments Other acute pulmonary embolism without acute cor pulmonale (HCC)    -  Primary ICD-10-CM: I26.99 
ICD-9-CM: 415.19 Vitals BP Pulse Temp Resp Height(growth percentile) Weight(growth percentile) 118/72 (BP 1 Location: Right arm, BP Patient Position: Sitting) 78 97.7 °F (36.5 °C) (Oral) 18 5' 2\" (1.575 m) 187 lb (84.8 kg) LMP SpO2 BMI OB Status Smoking Status 09/22/2018 98% 34.2 kg/m2 Having regular periods Former Smoker Vitals History BMI and BSA Data Body Mass Index Body Surface Area  
 34.2 kg/m 2 1.93 m 2 Preferred Pharmacy Pharmacy Name Phone [address] [phone] Your Updated Medication List  
  
   
This list is accurate as of 10/9/18  3:29 PM.  Always use your most recent med list.  
  
  
  
  
 citalopram 40 mg tablet Commonly known as:  Mae Bridgett Take 1 Tab by mouth once over twenty-four (24) hours for 180 days. famotidine 20 mg tablet Commonly known as:  PEPCID Take 1 Tab by mouth two (2) times a day. Indications: Dyspepsia  
  
 levothyroxine 137 mcg tablet Commonly known as:  SYNTHROID Take 137.5 mcg by mouth Daily (before breakfast). warfarin 6 mg tablet Commonly known as:  COUMADIN Take 1 Tab by mouth daily. October 2018 Details Sun Mon Tue Wed Thu Fri Sat  
   1  
  
  
  
   2  
  
  
  
   3  
  
  
  
   4  
  
  
  
   5  
  
  
  
   6  
  
  
  
  
  7  
  
  
  
   8  
  
  
  
   9  
  
6 mg See details    10  
  
6 mg  
  
   11  
  
6 mg  
  
   12  
  
6 mg  
  
   13  
  
6 mg  
  
  
 14  
  
6 mg  
  
   15  
  
6 mg  
  
   16  
  
6 mg  
  
   17  
  
6 mg  
  
   18  
  
6 mg  
  
   19  
  
6 mg  
  
   20  
  
6 mg  
  
  
  21  
  
6 mg  
  
   22  
  
6 mg  
  
   23  
  
6 mg  
  
   24  
  
6 mg  
  
   25  
  
6 mg  
  
   26  
  
6 mg  
  
   27  
  
6 mg  
  
  
  28  
  
6 mg  
  
   29  
  
6 mg  
  
   30  
  
6 mg  
  
   31  
  
6 mg Date Details 10/09 This INR check INR: 2.6 How to take your warfarin dose To take:  6 mg Take one of the 6 mg tablets. November 2018 Details [place] Lord Tue Wed Thu Fri Sat 1  
  
6 mg 2  
  
6 mg  
  
   3  
  
6 mg  
  
  
  4  
  
6 mg  
  
   5  
  
6 mg  
  
   6  
  
6 mg  
  
   7  
  
  
  
   8  
  
  
  
   9  
  
  
  
   10  
  
  
  
  
  11  
  
  
  
   12  
  
  
  
   13  
  
  
  
   14  
  
  
  
   15  
  
  
  
   16  
  
  
  
   17  
  
  
  
  
  18  
  
  
  
   19  
  
  
  
   20  
  
  
  
   21  
  
  
  
   22  
  
  
  
   23  
  
  
  
   24  
  
  
  
  
  25  
  
  
  
   26  
  
  
  
   27  
  
  
  
   28  
  
  
  
   29  
  
  
  
   30  
  
  
  
   
 Date Details No additional details Date of next INR:  11/6/2018 How to take your warfarin dose To take:  6 mg Take one of the 6 mg tablets. We Performed the Following AMB POC PT/INR [66575 CPT(R)] Follow-up Instructions Return in about 4 weeks (around 11/6/2018) for anticoagulation monitoring (if needed). Patient Instructions A Healthy Lifestyle: Care Instructions Your Care Instructions A healthy lifestyle can help you feel good, stay at a healthy weight, and have plenty of energy for both work and play. A healthy lifestyle is something you can share with your whole family. A healthy lifestyle also can lower your risk for serious health problems, such as high blood pressure, heart disease, and diabetes. You can follow a few steps listed below to improve your health and the health of your family. Follow-up care is a key part of your treatment and safety. Be sure to make and go to all appointments, and call your doctor if you are having problems. It's also a good idea to know your test results and keep a list of the medicines you take. How can you care for yourself at home? · Do not eat too much sugar, fat, or fast foods. You can still have dessert and treats now and then. The goal is moderation. · Start small to improve your eating habits. Pay attention to portion sizes, drink less juice and soda pop, and eat more fruits and vegetables. ¨ Eat a healthy amount of food. A 3-ounce serving of meat, for example, is about the size of a deck of cards. Fill the rest of your plate with vegetables and whole grains. ¨ Limit the amount of soda and sports drinks you have every day. Drink more water when you are thirsty. ¨ Eat at least 5 servings of fruits and vegetables every day. It may seem like a lot, but it is not hard to reach this goal. A serving or helping is 1 piece of fruit, 1 cup of vegetables, or 2 cups of leafy, raw vegetables. Have an apple or some carrot sticks as an afternoon snack instead of a candy bar. Try to have fruits and/or vegetables at every meal. 
· Make exercise part of your daily routine. You may want to start with simple activities, such as walking, bicycling, or slow swimming. Try to be active 30 to 60 minutes every day. You do not need to do all 30 to 60 minutes all at once. For example, you can exercise 3 times a day for 10 or 20 minutes. Moderate exercise is safe for most people, but it is always a good idea to talk to your doctor before starting an exercise program. 
· Keep moving. [place] the lawn, work in the garden, or [employer]. Take the stairs instead of the elevator at work. · If you smoke, quit.  People who smoke have an increased risk for heart attack, stroke, cancer, and other lung illnesses. Quitting is hard, but there are ways to boost your chance of quitting tobacco for good. ¨ Use nicotine gum, patches, or lozenges. ¨ Ask your doctor about stop-smoking programs and medicines. ¨ Keep trying. In addition to reducing your risk of diseases in the future, you will notice some benefits soon after you stop using tobacco. If you have shortness of breath or asthma symptoms, they will likely get better within a few weeks after you quit. · Limit how much alcohol you drink. Moderate amounts of alcohol (up to 2 drinks a day for men, 1 drink a day for women) are okay. But drinking too much can lead to liver problems, high blood pressure, and other health problems. Family health If you have a family, there are many things you can do together to improve your health. · Eat meals together as a family as often as possible. · Eat healthy foods. This includes fruits, vegetables, lean meats and dairy, and whole grains. · Include your family in your fitness plan. Most people think of activities such as jogging or tennis as the way to fitness, but there are many ways you and your family can be more active. Anything that makes you breathe hard and gets your heart pumping is exercise. Here are some tips: 
¨ Walk to do errands or to take your child to school or the bus. ¨ Go for a family bike ride after dinner instead of watching TV. Where can you learn more? Go to http://mamie-sandrita.info/. Enter J989 in the search box to learn more about \"A Healthy Lifestyle: Care Instructions. \" Current as of: December 7, 2017 Content Version: 11.8 © 3500-4223 [employer]. Care instructions adapted under license by [employer] (which disclaims liability or warranty for this information).  If you have questions about a medical condition or this instruction, always ask your healthcare professional. [employer] disclaims any warranty or liability for your use of this information. Introducing [facility]! Dear Meghana Bravo: Thank you for requesting a [employer] account. Our records indicate that you already have an active [employer] account. You can access your account anytime at [email or web address] Did you know that you can access your hospital and ER discharge instructions at any time in [employer]? You can also review all of your test results from your hospital stay or ER visit. Additional Information If you have questions, please visit the Frequently Asked Questions section of the [employer] website at [email or web address] Remember, [employer] is NOT to be used for urgent needs. For medical emergencies, dial 911. Now available from your iPhone and Android! Please provide this summary of care documentation to your next provider. Your primary care clinician is listed as Penny Max. If you have any questions after today's visit, please call [phone].

## 2018-10-18 DIAGNOSIS — I26.99 OTHER ACUTE PULMONARY EMBOLISM WITHOUT ACUTE COR PULMONALE (HCC): ICD-10-CM

## 2018-10-19 RX ORDER — WARFARIN 6 MG/1
6 TABLET ORAL DAILY
Qty: 30 TAB | Refills: 2 | Status: SHIPPED | OUTPATIENT
Start: 2018-10-19 | End: 2018-12-03

## 2018-10-24 ENCOUNTER — TELEPHONE (OUTPATIENT)
Dept: FAMILY MEDICINE CLINIC | Age: 28
End: 2018-10-24

## 2018-10-24 NOTE — TELEPHONE ENCOUNTER
Pt would like to talk with nurse about warfin medication.   Having bad headaches and wants to know what she can take to help please call pt at [phone]

## 2018-11-13 LAB
APTT HEX PL PPP: 10 SEC
APTT IMM NP PPP: 28.2 SEC
APTT PPP 1:1 SALINE: 43.4 SEC
APTT PPP: 35.6 SEC
B2 GLYCOPROT1 IGA SER-ACNC: <10 SAU
B2 GLYCOPROT1 IGG SER-ACNC: <10 SGU
B2 GLYCOPROT1 IGM SER-ACNC: <10 SMU
CARDIOLIPIN IGA SER IA-ACNC: <10 APL
CARDIOLIPIN IGG SER IA-ACNC: <10 GPL
CARDIOLIPIN IGM SER IA-ACNC: 16 MPL
CONFIRM DRVVT: 40 SEC
DRVVT SCREEN TO CONFIRM RATIO: 1.2 RATIO
LA NT PLATELET PPP: 7.3 SEC
LA PPP-IMP: ABNORMAL
PROTHROM IGG SERPL-ACNC: 29 G UNITS
PS IGG SER IA-ACNC: 0 GPS
PS IGM SER IA-ACNC: 2 MPS
SCREEN DRVVT: 52.7 SEC

## 2018-11-15 NOTE — PROGRESS NOTES
[facility] at [facility]  [address] [facility], [address] [place]: [phone]  F: [phone]      Reason for Visit:   Dimitrios Da Silva is a 34 y.o. female who is seen for follow up of PE. History of Present Illness:   She remains on coumadin. She reports that her INR has been at goal.  Reports a fairly steady dose. No bleeding. No leg swelling. Some pain behind left pain, just past few days. Occasional TORRES. Some occasional left sided chest pain, comes and goes, sometimes at rest and sometimes with exertion. She is accompanied by her mother today. PAST HISTORY: The following sections were reviewed and updated in the EMR as appropriate: PMH, SH, FH, Medications, Allergies. Allergies   Allergen Reactions    Ancef [Cefazolin] Hives     Given at c/s, swelling of face/hives, tx'd with benadryl    Pcn [Penicillins] Hives     Facial edema     Peanut Hives    Chocolate [Cocoa] Hives    Citrus And Derivatives Nausea and Vomiting     Oranges only    [place] Hives    Beef Containing Products Hives    Chicken Derived Hives    Corn Hives    Egg Hives    Milk Hives    Milk Prot-Turm-Pepper-Pineappl Hives    Shellfish Derived Hives    Soy Hives    Strawberry Hives      Review of Systems: A complete review of systems was obtained, reviewed, and scanned into the EMR. Pertinent findings reviewed above.       Physical Exam:     Visit Vitals  /66 (BP 1 Location: Left arm, BP Patient Position: Sitting)   Pulse 85   Temp 95.4 °F (35.2 °C) (Oral)   Resp 18   Ht 5' 2\" (1.575 m)   Wt 193 lb (87.5 kg)   SpO2 99%   BMI 35.30 kg/m²     General: No distress  Eyes: PERRLA, anicteric sclerae  HENT: Atraumatic, OP clear  Neck: Supple  Lymphatic: No cervical, supraclavicular, or inguinal adenopathy  Respiratory: CTAB, normal respiratory effort  CV: Normal rate, regular rhythm, no murmurs, no peripheral edema  GI: Soft, nontender, nondistended, no masses, no hepatomegaly, no splenomegaly  MS: Normal gait and station. Digits without clubbing or cyanosis. Skin: No rashes, ecchymoses, or petechiae. Normal temperature, turgor, and texture. Psych: Alert, oriented, appropriate affect, normal judgment/insight    Results:     Lab Results   Component Value Date/Time    WBC 9.8 08/01/2018 09:07 PM    HGB 11.1 (L) 08/01/2018 09:07 PM    HCT 33.5 (L) 08/01/2018 09:07 PM    PLATELET 978 27/49/5863 09:07 PM    MCV 80.5 08/01/2018 09:07 PM    ABS. NEUTROPHILS 6.3 08/01/2018 09:07 PM    Hemoglobin (POC) 13.3 03/23/2014 05:12 AM    Hematocrit (POC) 33 (L) 07/03/2018 06:54 PM     Lab Results   Component Value Date/Time    Sodium 136 08/01/2018 09:07 PM    Potassium 4.1 08/01/2018 09:07 PM    Chloride 101 08/01/2018 09:07 PM    CO2 25 08/01/2018 09:07 PM    Glucose 89 08/01/2018 09:07 PM    BUN 12 08/01/2018 09:07 PM    Creatinine 0.71 08/01/2018 09:07 PM    GFR est AA >60 08/01/2018 09:07 PM    GFR est non-AA >60 08/01/2018 09:07 PM    Calcium 9.0 08/01/2018 09:07 PM    Sodium (POC) 139 07/03/2018 06:54 PM    Potassium (POC) 3.6 07/03/2018 06:54 PM    Chloride (POC) 103 07/03/2018 06:54 PM    Glucose (POC) 100 07/03/2018 06:54 PM    BUN (POC) 7 (L) 07/03/2018 06:54 PM    Creatinine (POC) 0.8 07/03/2018 06:54 PM    Calcium, ionized (POC) 1.19 07/03/2018 06:54 PM     Lab Results   Component Value Date/Time    Bilirubin, total 0.1 (L) 08/01/2018 09:07 PM    ALT (SGPT) 20 08/01/2018 09:07 PM    AST (SGOT) 14 (L) 08/01/2018 09:07 PM    Alk.  phosphatase 68 08/01/2018 09:07 PM    Protein, total 7.7 08/01/2018 09:07 PM    Albumin 3.4 (L) 08/01/2018 09:07 PM    Globulin 4.3 (H) 08/01/2018 09:07 PM     Lab Results   Component Value Date/Time    Sed rate, automated 36 (H) 07/04/2018 01:14 AM    TSH 0.674 09/25/2018 09:15 AM    Lipase 63 (L) 01/09/2018 10:54 AM    HIV 1/O/2 Abs <1.00 05/06/2015 03:23 PM     Lab Results   Component Value Date/Time    INR 2.1 (H) 08/01/2018 09:07 PM    INR POC 2.6 10/09/2018 03:11 PM    aPTT 30.8 07/04/2018 01:14 AM    D-dimer 2.51 (H) 07/03/2018 06:24 PM    Fibrinogen 386 07/04/2018 01:14 AM    Antithrombin Activity 93 07/04/2018 01:14 AM    Protein S-Functional 61 (L) 07/04/2018 01:14 AM    Protein C-Functional 131 07/04/2018 01:14 AM    Anticardiolipin Ab, IgG <10 11/06/2018 12:24 PM    Anticardiolipin Ab, IgM 16 11/06/2018 12:24 PM    Anticardiolipin Ab, IgA <10 11/06/2018 12:24 PM    Beta-2 Glycoprotein I, IgG <10 11/06/2018 12:24 PM    Beta-2 Glycoprotein I, IgM <10 11/06/2018 12:24 PM    Beta-2 Glycoprotein I, IgA <10 11/06/2018 12:24 PM       7/4/2018  Prothrombin mutation: negative  Factor V Leiden mutation: negative    11/6/2018  APLA panel: indeterminate for lupus anticoagulant, otherwise negative      CTA Chest 5/21/2014: negative for PE    CTA Chest 7/3/2018: Pulmonary embolus in RUL, RLL, JOSÉ, LLL, and right main pulmonary artery. Bilateral LE Doppler 9/27/2018: negative      Assessment:   1) Pulmonary embolism  Diagnosed 7/3/2018. Risk factors at that time include obesity and recent OCP use. Treated with coumadin since that time. Repeat doppler negative for residual thrombus. This is likely a provoked event, based on her OCP use. Lupus anticoagulant was indeterminate. I recommend she stop her anticoagulation at this time, and we can repeat her APLA panel and protein S panel in a few months. I advised her to take ASA 81mg daily until she follows up with me and we see what her labs show. 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 Not sure this has any clinical significance, can get false levels in the setting of acute events. I recommend we repeat this after she is off anticoagulation.     Plan:     · D/c coumadin  · Start ASA 81mg PO daily for next 3 months  · Labs in 3 months: lupus anticoagulant, Protein S panel  · Return to see me in 3 months    >50% of this 25 minute visit was spent on counseling/coordination of care regarding the above diagnoses.       Signed By: Shayna Gunn MD

## 2018-11-20 ENCOUNTER — OFFICE VISIT (OUTPATIENT)
Dept: ONCOLOGY | Age: 28
End: 2018-11-20

## 2018-11-20 VITALS
TEMPERATURE: 95.4 F | SYSTOLIC BLOOD PRESSURE: 105 MMHG | DIASTOLIC BLOOD PRESSURE: 66 MMHG | BODY MASS INDEX: 35.51 KG/M2 | HEIGHT: 62 IN | HEART RATE: 85 BPM | WEIGHT: 193 LBS | RESPIRATION RATE: 18 BRPM | OXYGEN SATURATION: 99 %

## 2018-11-20 DIAGNOSIS — I26.99 OTHER ACUTE PULMONARY EMBOLISM WITHOUT ACUTE COR PULMONALE (HCC): Primary | ICD-10-CM

## 2018-11-20 RX ORDER — ARIPIPRAZOLE 5 MG/1
TABLET ORAL DAILY
COMMUNITY
End: 2019-02-01

## 2018-12-03 ENCOUNTER — HOSPITAL ENCOUNTER (EMERGENCY)
Age: 28
Discharge: HOME | End: 2018-12-03
Attending: EMERGENCY MEDICINE
Payer: COMMERCIAL

## 2018-12-03 VITALS
DIASTOLIC BLOOD PRESSURE: 54 MMHG | OXYGEN SATURATION: 100 % | TEMPERATURE: 98 F | SYSTOLIC BLOOD PRESSURE: 117 MMHG | BODY MASS INDEX: 35.33 KG/M2 | HEIGHT: 62 IN | HEART RATE: 76 BPM | WEIGHT: 192 LBS | RESPIRATION RATE: 18 BRPM

## 2018-12-03 DIAGNOSIS — R07.89 ATYPICAL CHEST PAIN: Primary | ICD-10-CM

## 2018-12-03 LAB
ALBUMIN SERPL-MCNC: 3.9 G/DL (ref 3.5–5)
ALBUMIN/GLOB SERPL: 1 {RATIO} (ref 1.1–2.2)
ALP SERPL-CCNC: 76 U/L (ref 45–117)
ALT SERPL-CCNC: 24 U/L (ref 12–78)
ANION GAP SERPL CALC-SCNC: 9 MMOL/L (ref 5–15)
APTT PPP: 24.7 SEC (ref 22.1–32)
AST SERPL-CCNC: 18 U/L (ref 15–37)
BASOPHILS # BLD: 0 K/UL (ref 0–0.1)
BASOPHILS NFR BLD: 0 % (ref 0–1)
BILIRUB SERPL-MCNC: 0.1 MG/DL (ref 0.2–1)
BUN SERPL-MCNC: 18 MG/DL (ref 6–20)
BUN/CREAT SERPL: 22 (ref 12–20)
CALCIUM SERPL-MCNC: 9.3 MG/DL (ref 8.5–10.1)
CHLORIDE SERPL-SCNC: 102 MMOL/L (ref 97–108)
CO2 SERPL-SCNC: 26 MMOL/L (ref 21–32)
CREAT SERPL-MCNC: 0.81 MG/DL (ref 0.55–1.02)
D DIMER PPP FEU-MCNC: 0.2 MG/L FEU (ref 0–0.65)
DIFFERENTIAL METHOD BLD: ABNORMAL
EOSINOPHIL # BLD: 0.1 K/UL (ref 0–0.4)
EOSINOPHIL NFR BLD: 1 % (ref 0–7)
ERYTHROCYTE [DISTWIDTH] IN BLOOD BY AUTOMATED COUNT: 14.6 % (ref 11.5–14.5)
GLOBULIN SER CALC-MCNC: 4 G/DL (ref 2–4)
GLUCOSE SERPL-MCNC: 89 MG/DL (ref 65–100)
HCT VFR BLD AUTO: 34.8 % (ref 35–47)
HGB BLD-MCNC: 11.1 G/DL (ref 11.5–16)
INR PPP: 1 (ref 0.9–1.1)
LYMPHOCYTES # BLD: 2.4 K/UL (ref 0.8–3.5)
LYMPHOCYTES NFR BLD: 26 % (ref 12–49)
MCH RBC QN AUTO: 26.1 PG (ref 26–34)
MCHC RBC AUTO-ENTMCNC: 31.9 G/DL (ref 30–36.5)
MCV RBC AUTO: 81.7 FL (ref 80–99)
MONOCYTES # BLD: 0.8 K/UL (ref 0–1)
MONOCYTES NFR BLD: 9 % (ref 5–13)
NEUTS SEG # BLD: 5.8 K/UL (ref 1.8–8)
NEUTS SEG NFR BLD: 64 % (ref 32–75)
PLATELET # BLD AUTO: 394 K/UL (ref 150–400)
PMV BLD AUTO: 9.6 FL (ref 8.9–12.9)
POTASSIUM SERPL-SCNC: 3.6 MMOL/L (ref 3.5–5.1)
PROT SERPL-MCNC: 7.9 G/DL (ref 6.4–8.2)
PROTHROMBIN TIME: 9.3 SEC (ref 9–11.1)
RBC # BLD AUTO: 4.26 M/UL (ref 3.8–5.2)
SODIUM SERPL-SCNC: 137 MMOL/L (ref 136–145)
THERAPEUTIC RANGE,PTTT: NORMAL SECS (ref 58–77)
TROPONIN I SERPL-MCNC: <0.05 NG/ML
WBC # BLD AUTO: 9.1 K/UL (ref 3.6–11)
XXWBCSUS: 0

## 2018-12-03 PROCEDURE — 85379 FIBRIN DEGRADATION QUANT: CPT

## 2018-12-03 PROCEDURE — 85025 COMPLETE CBC W/AUTO DIFF WBC: CPT

## 2018-12-03 PROCEDURE — 74011250636 HC RX REV CODE- 250/636: Performed by: EMERGENCY MEDICINE

## 2018-12-03 PROCEDURE — 80053 COMPREHEN METABOLIC PANEL: CPT

## 2018-12-03 PROCEDURE — 84484 ASSAY OF TROPONIN QUANT: CPT

## 2018-12-03 PROCEDURE — 99283 EMERGENCY DEPT VISIT LOW MDM: CPT

## 2018-12-03 PROCEDURE — 96361 HYDRATE IV INFUSION ADD-ON: CPT

## 2018-12-03 PROCEDURE — 85610 PROTHROMBIN TIME: CPT

## 2018-12-03 PROCEDURE — 93005 ELECTROCARDIOGRAM TRACING: CPT

## 2018-12-03 PROCEDURE — 36415 COLL VENOUS BLD VENIPUNCTURE: CPT

## 2018-12-03 PROCEDURE — 96374 THER/PROPH/DIAG INJ IV PUSH: CPT

## 2018-12-03 PROCEDURE — 85730 THROMBOPLASTIN TIME PARTIAL: CPT

## 2018-12-03 RX ORDER — KETOROLAC TROMETHAMINE 30 MG/ML
30 INJECTION, SOLUTION INTRAMUSCULAR; INTRAVENOUS
Status: COMPLETED | OUTPATIENT
Start: 2018-12-03 | End: 2018-12-03

## 2018-12-03 RX ORDER — ASPIRIN 325 MG
325 TABLET ORAL DAILY
COMMUNITY
End: 2020-01-26

## 2018-12-03 RX ADMIN — KETOROLAC TROMETHAMINE 30 MG: 30 INJECTION, SOLUTION INTRAMUSCULAR at 19:36

## 2018-12-03 RX ADMIN — SODIUM CHLORIDE 500 ML: 900 INJECTION, SOLUTION INTRAVENOUS at 19:36

## 2018-12-04 LAB
ATRIAL RATE: 80 BPM
CALCULATED P AXIS, ECG09: 55 DEGREES
CALCULATED R AXIS, ECG10: -8 DEGREES
CALCULATED T AXIS, ECG11: 22 DEGREES
DIAGNOSIS, 93000: NORMAL
P-R INTERVAL, ECG05: 160 MS
Q-T INTERVAL, ECG07: 388 MS
QRS DURATION, ECG06: 98 MS
QTC CALCULATION (BEZET), ECG08: 447 MS
VENTRICULAR RATE, ECG03: 80 BPM

## 2018-12-04 NOTE — ED NOTES
The patient was discharged home by  in stable condition. The patient is alert and oriented, in no respiratory distress and discharge vital signs obtained. The patient's diagnosis, condition and treatment were explained. The patient expressed understanding. No prescriptions given. No work/school note given. A discharge plan has been developed. A  was not involved in the process. Aftercare instructions were given. Pt ambulatory out of the ED with steady gait. All personal belongings, and discharge papers in hand upon departure from ED.

## 2018-12-04 NOTE — ED NOTES
Patient resting on the stretcher in no distress and currently without complaints. V/S reassessed. Awaiting further care management. Call bell within reach; will continue to monitor.

## 2018-12-04 NOTE — ED PROVIDER NOTES
Lenka Trujillo is a 33 yo F with history of PE who had previously been taking coumadin but was taken off of it last month. She states that she developed left lower chest pain this afternoon while at work. The pain increases when she takes a deep breath and she felt short of breath when the pain started. Patient denies recent leg swelling or pain. Past Medical History:  
Diagnosis Date  Anxiety  Calculus of kidney  Depression  Dissociative disorder  Environmental allergies  GERD (gastroesophageal reflux disease)  Hashimoto's thyroiditis 10/24/2011  Hashimoto's thyroiditis 10/24/2011  Hypotension  Hypothyroid 2011  Hypothyroidism  MDD (major depressive disorder)  OCD (obsessive compulsive disorder)  Psychiatric disorder   
 depression --2 yr old daughter  2013  PTSD (post-traumatic stress disorder)  Unspecified adverse effect of anesthesia   
 per patient with epidural had severe N&V and passed out Past Surgical History:  
Procedure Laterality Date  DELIVERY   11  
 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  
Problem Relation Age of Onset  Heart Disease Mother   
     miltral value prolapse  Hypertension Father  Thyroid Disease Maternal Grandmother  Diabetes Paternal Grandmother  Diabetes Maternal Aunt  Cancer Paternal Grandfather   
     throat/stomach cancer,  at age 72  Breast Cancer Other   
     maternal great grandmother Social History Socioeconomic History  Marital status: LEGALLY  Spouse name: Not on file  Number of children: Not on file  Years of education: Not on file  Highest education level: Not on file Social Needs  Financial resource strain: Not on file  Food insecurity - worry: Not on file  Food insecurity - inability: Not on file  Transportation needs - medical: Not on file  Transportation needs - non-medical: Not on file Occupational History  Not on file Tobacco Use  Smoking status: Former Smoker Packs/day: 0.10 Years: 1.00 Pack years: 0.10 Last attempt to quit: 2018 Years since quittin.5  Smokeless tobacco: Never Used Substance and Sexual Activity  Alcohol use: No  
  Comment: social  
 Drug use: No  
 Sexual activity: Yes  
  Partners: Male Birth control/protection: Condom Other Topics Concern  Not on file Social History Narrative  Not on file ALLERGIES: Ancef [cefazolin]; Pcn [penicillins]; Peanut; Chocolate [cocoa]; Citrus and derivatives; Delfin Blitz; Beef containing products; Chicken derived; Corn; Egg; Milk; Milk prot-turm-pepper-pineappl; Shellfish derived; Soy; and [place] Review of Systems Constitutional: Negative for fever. HENT: Negative for sore throat. Eyes: Negative for visual disturbance. Respiratory: Positive for shortness of breath. Negative for cough and wheezing. Cardiovascular: Positive for chest pain. Negative for leg swelling. Gastrointestinal: Positive for abdominal pain. Genitourinary: Negative for dysuria. Musculoskeletal: Negative for back pain. Skin: Negative for rash. Neurological: Negative for headaches. Vitals:  
 18 1907 BP: 129/81 Pulse: 90 Resp: 18 Temp: 97.7 °F (36.5 °C) SpO2: 100% Weight: 87.1 kg (192 lb) Height: 5' 2\" (1.575 m) Physical Exam  
Constitutional: She appears well-developed and well-nourished. No distress. HENT:  
Head: Normocephalic and atraumatic. Mouth/Throat: Oropharynx is clear and moist.  
Eyes: Conjunctivae and EOM are normal.  
Neck: Normal range of motion and phonation normal.  
Cardiovascular: Normal rate and intact distal pulses. Pulmonary/Chest: Effort normal. No respiratory distress.  She has no wheezes. She has no rales. She exhibits tenderness. Abdominal: Soft. She exhibits no distension. There is no tenderness. Musculoskeletal: Normal range of motion. She exhibits no tenderness. Neurological: She is alert. She is not disoriented. She exhibits normal muscle tone. Skin: Skin is warm and dry. Nursing note and vitals reviewed. ED EKG interpretation: 
Rhythm: normal sinus rhythm; and regular . Rate (approx.): 80; Axis: normal; P wave: left atrial enlargement; QRS interval: normal ; ST/T wave: normal;; Other findings: left ventricular hypertrophy, unchanged from prior EKG on 8/1/2018. This EKG was interpreted by Kaci Thomas MD,ED Provider. MDM Patient reassessed and pain is improved after toradol. Labs reviewed and Ddimer is normal.  Discussed findings with patient. Will discharge home. Procedures

## 2018-12-04 NOTE — ED TRIAGE NOTES
Pt arrived via walk in for complaint of left sided pain. Has history of PE in the past and is off her coumadin. Expresses concern that this could be a PE.  Admits to some shortness of breath while at rest.

## 2018-12-23 ENCOUNTER — HOSPITAL ENCOUNTER (EMERGENCY)
Age: 28
Discharge: HOME | End: 2018-12-23
Attending: EMERGENCY MEDICINE
Payer: COMMERCIAL

## 2018-12-23 VITALS
HEIGHT: 62 IN | RESPIRATION RATE: 18 BRPM | TEMPERATURE: 99.1 F | BODY MASS INDEX: 36.72 KG/M2 | HEART RATE: 100 BPM | DIASTOLIC BLOOD PRESSURE: 80 MMHG | OXYGEN SATURATION: 100 % | WEIGHT: 199.52 LBS | SYSTOLIC BLOOD PRESSURE: 132 MMHG

## 2018-12-23 DIAGNOSIS — J02.9 VIRAL PHARYNGITIS: Primary | ICD-10-CM

## 2018-12-23 DIAGNOSIS — J06.9 ACUTE UPPER RESPIRATORY INFECTION: ICD-10-CM

## 2018-12-23 LAB — DEPRECATED S PYO AG THROAT QL EIA: NEGATIVE

## 2018-12-23 PROCEDURE — 99283 EMERGENCY DEPT VISIT LOW MDM: CPT

## 2018-12-23 PROCEDURE — 74011250637 HC RX REV CODE- 250/637: Performed by: EMERGENCY MEDICINE

## 2018-12-23 PROCEDURE — 87147 CULTURE TYPE IMMUNOLOGIC: CPT

## 2018-12-23 PROCEDURE — 87070 CULTURE OTHR SPECIMN AEROBIC: CPT

## 2018-12-23 PROCEDURE — 87880 STREP A ASSAY W/OPTIC: CPT

## 2018-12-23 RX ORDER — ACETAMINOPHEN 325 MG/1
975 TABLET ORAL
Status: COMPLETED | OUTPATIENT
Start: 2018-12-23 | End: 2018-12-23

## 2018-12-23 RX ADMIN — ACETAMINOPHEN 975 MG: 325 TABLET, FILM COATED ORAL at 19:47

## 2018-12-24 NOTE — ED PROVIDER NOTES
HPI     34year old female with  anxiety, kidneys stones, h/o PE, hypothyroid, GERD, presents with sore throat, congestion x 2 days. Tried 800 mg ibuprofen yesterday x1 without relief. NO fever. NO chest pain or SOB, no nausea/vomiting or diarrhea. Drinking fluids ok. Good urine output. Positive sick contacts with same.    Past Medical History:   Diagnosis Date    Anxiety     Calculus of kidney     Depression     Dissociative disorder     Environmental allergies     GERD (gastroesophageal reflux disease)     Hashimoto's thyroiditis 10/24/2011    Hashimoto's thyroiditis 10/24/2011    Hypotension     Hypothyroid 2011    Hypothyroidism     MDD (major depressive disorder)     OCD (obsessive compulsive disorder)     Psychiatric disorder     depression --2 yr old daughter  2013    PTSD (post-traumatic stress disorder)     Unspecified adverse effect of anesthesia     per patient with epidural had severe N&V and passed out       Past Surgical History:   Procedure Laterality Date    DELIVERY   11    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, congenital anomaly    HX  SECTION  2016         Family History:   Problem Relation Age of Onset    Heart Disease Mother         miltral value prolapse    Hypertension Father     Thyroid Disease Maternal Grandmother     Diabetes Paternal Grandmother     Diabetes Maternal Aunt     Cancer Paternal Grandfather         throat/stomach cancer,  at age 72    Breast Cancer Other         maternal great grandmother       Social History     Socioeconomic History    Marital status: LEGALLY      Spouse name: Not on file    Number of children: Not on file    Years of education: Not on file    Highest education level: Not on file   Social Needs    Financial resource strain: Not on file    Food insecurity - worry: Not on file    Food insecurity - inability: Not on file    Transportation needs - medical: Not on file   [email or web address] Transportation needs - non-medical: Not on file   Occupational History    Not on file   Tobacco Use    Smoking status: Former Smoker     Packs/day: 0.10     Years: 1.00     Pack years: 0.10     Last attempt to quit: 2018     Years since quittin.5    Smokeless tobacco: Never Used   Substance and Sexual Activity    Alcohol use: No     Comment: social    Drug use: No    Sexual activity: Yes     Partners: Male     Birth control/protection: Condom   Other Topics Concern    Not on file   Social History Narrative    Not on file         ALLERGIES: Ancef [cefazolin]; Pcn [penicillins]; Peanut; Chocolate [cocoa]; Citrus and derivatives; Holder Reyes; Beef containing products; Chicken derived; Corn; Egg; Milk; Milk prot-turm-pepper-pineappl; Shellfish derived; Soy; and [place]    Review of Systems   Constitutional: Negative for fever. HENT: Positive for congestion and sore throat. Negative for ear pain, trouble swallowing and voice change. Respiratory: Positive for cough. Negative for chest tightness and shortness of breath. Gastrointestinal: Negative for abdominal pain, nausea and vomiting. Endocrine: Negative for polyuria. Genitourinary: Negative for dysuria. Musculoskeletal: Negative for gait problem. Skin: Negative for rash. Allergic/Immunologic: Negative for immunocompromised state. Neurological: Negative for headaches. Psychiatric/Behavioral: Negative for dysphoric mood. Vitals:    18 1934   BP: 132/80   Pulse: 100   Resp: 18   Temp: 99.1 °F (37.3 °C)   SpO2: 100%   Weight: 90.5 kg (199 lb 8.3 oz)   Height: 5' 2\" (1.575 m)            Physical Exam   Constitutional: She is oriented to person, place, and time. She appears well-developed and well-nourished. No distress. HENT:   Head: Normocephalic and atraumatic. Mouth/Throat: No oropharyngeal exudate. Midline uvula  No drooling, no stridor  Mild erythema, no exudate   Eyes: Pupils are equal, round, and reactive to light. Right eye exhibits no discharge. Left eye exhibits no discharge. No scleral icterus. Neck: Normal range of motion. Neck supple. No JVD present. Cardiovascular: Normal rate, regular rhythm and normal heart sounds. No murmur heard. Pulmonary/Chest: Effort normal and breath sounds normal. No stridor. No respiratory distress. She has no wheezes. She has no rales. She exhibits no tenderness. Abdominal: Soft. Bowel sounds are normal. She exhibits no distension and no mass. There is no tenderness. There is no rebound and no guarding. Musculoskeletal: Normal range of motion. Neurological: She is oriented to person, place, and time. Skin: Skin is warm and dry. Capillary refill takes less than 2 seconds. No rash noted. Psychiatric: She has a normal mood and affect. Her behavior is normal. Judgment and thought content normal.        MDM       Procedures      Likely viral URI with PND. WIll check strep. Supportive care. Strep negative.  OTC URI meds, follow up if no better in 5 days, sooner if worsening.,

## 2018-12-24 NOTE — DISCHARGE INSTRUCTIONS
Strep test was negative. We did send a throat culture, you will be notified if positive. You can use over the counter cold medication for your symptoms. Continue the 800mg ibuprofen every 8 hours (4 of the 200 mg tablets) and/or 2 tylenol every 4 hours. Plenty of fluids. Symptoms typically last 3-5 days. If you are no better in 5 days, or if you feel your symptoms are acutely getting worse- see your doctor or return here. Sore Throat: Care Instructions  Your Care Instructions    Infection by bacteria or a virus causes most sore throats. Cigarette smoke, dry air, air pollution, allergies, and yelling can also cause a sore throat. Sore throats can be painful and annoying. Fortunately, most sore throats go away on their own. If you have a bacterial infection, your doctor may prescribe antibiotics. Follow-up care is a key part of your treatment and safety. Be sure to make and go to all appointments, and call your doctor if you are having problems. It's also a good idea to know your test results and keep a list of the medicines you take. How can you care for yourself at home? · If your doctor prescribed antibiotics, take them as directed. Do not stop taking them just because you feel better. You need to take the full course of antibiotics. · Gargle with warm salt water once an hour to help reduce swelling and relieve discomfort. Use 1 teaspoon of salt mixed in 1 cup of warm water. · Take an over-the-counter pain medicine, such as acetaminophen (Tylenol), ibuprofen (Advil, Motrin), or naproxen (Aleve). Read and follow all instructions on the label. · Be careful when taking over-the-counter cold or flu medicines and Tylenol at the same time. Many of these medicines have acetaminophen, which is Tylenol. Read the labels to make sure that you are not taking more than the recommended dose. Too much acetaminophen (Tylenol) can be harmful. · Drink plenty of fluids. Fluids may help soothe an irritated throat.  Hot fluids, such as tea or soup, may help decrease throat pain. · Use over-the-counter throat lozenges to soothe pain. Regular cough drops or hard candy may also help. These should not be given to young children because of the risk of choking. · Do not smoke or allow others to smoke around you. If you need help quitting, talk to your doctor about stop-smoking programs and medicines. These can increase your chances of quitting for good. · Use a vaporizer or humidifier to add moisture to your bedroom. Follow the directions for cleaning the machine. When should you call for help? Call your doctor now or seek immediate medical care if:    · You have new or worse trouble swallowing.     · Your sore throat gets much worse on one side.    Watch closely for changes in your health, and be sure to contact your doctor if you do not get better as expected. Where can you learn more? Go to http://mamie-sandrita.info/. Enter 062 [id number] 80 [id number] in the search box to learn more about \"Sore Throat: Care Instructions. \"  Current as of: March 28, 2018  Content Version: 11.8  © 2255-5038 [employer]. Care instructions adapted under license by [employer] (which disclaims liability or warranty for this information). If you have questions about a medical condition or this instruction, always ask your healthcare professional. [place] [address] any warranty or liability for your use of this information. Upper Respiratory Infection (Cold): Care Instructions  Your Care Instructions    An upper respiratory infection, or URI, is an infection of the nose, sinuses, or throat. URIs are spread by coughs, sneezes, and direct contact. The common cold is the most frequent kind of URI. The flu and sinus infections are other kinds of URIs. Almost all URIs are caused by viruses. Antibiotics won't cure them. But you can treat most infections with home care.  This may include drinking lots of fluids and taking over-the-counter pain medicine. You will probably feel better in 4 to 10 days. The doctor has checked you carefully, but problems can develop later. If you notice any problems or new symptoms, get medical treatment right away. Follow-up care is a key part of your treatment and safety. Be sure to make and go to all appointments, and call your doctor if you are having problems. It's also a good idea to know your test results and keep a list of the medicines you take. How can you care for yourself at home? · To prevent dehydration, drink plenty of fluids, enough so that your urine is light yellow or clear like water. Choose water and other caffeine-free clear liquids until you feel better. If you have kidney, heart, or liver disease and have to limit fluids, talk with your doctor before you increase the amount of fluids you drink. · Take an over-the-counter pain medicine, such as acetaminophen (Tylenol), ibuprofen (Advil, Motrin), or naproxen (Aleve). Read and follow all instructions on the label. · Before you use cough and cold medicines, check the label. These medicines may not be safe for young children or for people with certain health problems. · Be careful when taking over-the-counter cold or flu medicines and Tylenol at the same time. Many of these medicines have acetaminophen, which is Tylenol. Read the labels to make sure that you are not taking more than the recommended dose. Too much acetaminophen (Tylenol) can be harmful. · Get plenty of rest.  · Do not smoke or allow others to smoke around you. If you need help quitting, talk to your doctor about stop-smoking programs and medicines. These can increase your chances of quitting for good. When should you call for help? Call 911 anytime you think you may need emergency care.  For example, call if:    · You have severe trouble breathing.    Call your doctor now or seek immediate medical care if:    · You seem to be getting much sicker.     · You have new or worse trouble breathing.     · You have a new or higher fever.     · You have a new rash.    Watch closely for changes in your health, and be sure to contact your doctor if:    · You have a new symptom, such as a sore throat, an earache, or sinus pain.     · You cough more deeply or more often, especially if you notice more mucus or a change in the color of your mucus.     · You do not get better as expected. Where can you learn more? Go to http://mamie-sandrita.info/. Enter E201 in the search box to learn more about \"Upper Respiratory Infection (Cold): Care Instructions. \"  Current as of: December 6, 2017  Content Version: 11.8  © 9701-3068 [employer]. Care instructions adapted under license by [employer] (which disclaims liability or warranty for this information). If you have questions about a medical condition or this instruction, always ask your healthcare professional. [place] [address] any warranty or liability for your use of this information.

## 2018-12-26 LAB
BACTERIA SPEC CULT: ABNORMAL
SERVICE CMNT-IMP: ABNORMAL

## 2019-02-01 ENCOUNTER — HOSPITAL ENCOUNTER (EMERGENCY)
Age: 29
Discharge: HOME | End: 2019-02-01
Attending: EMERGENCY MEDICINE
Payer: COMMERCIAL

## 2019-02-01 VITALS
HEART RATE: 98 BPM | WEIGHT: 198.63 LBS | SYSTOLIC BLOOD PRESSURE: 99 MMHG | BODY MASS INDEX: 36.55 KG/M2 | OXYGEN SATURATION: 98 % | RESPIRATION RATE: 16 BRPM | TEMPERATURE: 98.9 F | DIASTOLIC BLOOD PRESSURE: 55 MMHG | HEIGHT: 62 IN

## 2019-02-01 DIAGNOSIS — K52.9 GASTROENTERITIS, ACUTE: Primary | ICD-10-CM

## 2019-02-01 DIAGNOSIS — E86.0 DEHYDRATION: ICD-10-CM

## 2019-02-01 LAB
ALBUMIN SERPL-MCNC: 4.2 G/DL (ref 3.5–5)
ALBUMIN/GLOB SERPL: 1 {RATIO} (ref 1.1–2.2)
ALP SERPL-CCNC: 78 U/L (ref 45–117)
ALT SERPL-CCNC: 26 U/L (ref 12–78)
ANION GAP SERPL CALC-SCNC: 11 MMOL/L (ref 5–15)
APPEARANCE UR: ABNORMAL
AST SERPL-CCNC: 21 U/L (ref 15–37)
BACTERIA URNS QL MICRO: ABNORMAL /HPF
BASOPHILS # BLD: 0 K/UL (ref 0–0.1)
BASOPHILS NFR BLD: 0 % (ref 0–1)
BILIRUB SERPL-MCNC: 0.4 MG/DL (ref 0.2–1)
BILIRUB UR QL: NEGATIVE
BUN SERPL-MCNC: 15 MG/DL (ref 6–20)
BUN/CREAT SERPL: 16 (ref 12–20)
CALCIUM SERPL-MCNC: 8.9 MG/DL (ref 8.5–10.1)
CHLORIDE SERPL-SCNC: 101 MMOL/L (ref 97–108)
CO2 SERPL-SCNC: 26 MMOL/L (ref 21–32)
COLOR UR: ABNORMAL
CREAT SERPL-MCNC: 0.93 MG/DL (ref 0.55–1.02)
DIFFERENTIAL METHOD BLD: ABNORMAL
EOSINOPHIL # BLD: 0 K/UL (ref 0–0.4)
EOSINOPHIL NFR BLD: 0 % (ref 0–7)
EPITH CASTS URNS QL MICRO: ABNORMAL /LPF
ERYTHROCYTE [DISTWIDTH] IN BLOOD BY AUTOMATED COUNT: 15.3 % (ref 11.5–14.5)
GLOBULIN SER CALC-MCNC: 4.1 G/DL (ref 2–4)
GLUCOSE SERPL-MCNC: 137 MG/DL (ref 65–100)
GLUCOSE UR STRIP.AUTO-MCNC: NEGATIVE MG/DL
HCG UR QL: NEGATIVE
HCT VFR BLD AUTO: 38.1 % (ref 35–47)
HGB BLD-MCNC: 12 G/DL (ref 11.5–16)
HGB UR QL STRIP: NEGATIVE
KETONES UR QL STRIP.AUTO: ABNORMAL MG/DL
LEUKOCYTE ESTERASE UR QL STRIP.AUTO: NEGATIVE
LIPASE SERPL-CCNC: 68 U/L (ref 73–393)
LYMPHOCYTES # BLD: 0.6 K/UL (ref 0.8–3.5)
LYMPHOCYTES NFR BLD: 4 % (ref 12–49)
MAGNESIUM SERPL-MCNC: 1.7 MG/DL (ref 1.6–2.4)
MCH RBC QN AUTO: 25.6 PG (ref 26–34)
MCHC RBC AUTO-ENTMCNC: 31.5 G/DL (ref 30–36.5)
MCV RBC AUTO: 81.4 FL (ref 80–99)
MONOCYTES # BLD: 0.6 K/UL (ref 0–1)
MONOCYTES NFR BLD: 4 % (ref 5–13)
NEUTS SEG # BLD: 14.4 K/UL (ref 1.8–8)
NEUTS SEG NFR BLD: 92 % (ref 32–75)
NITRITE UR QL STRIP.AUTO: NEGATIVE
PH UR STRIP: 7.5 [PH] (ref 5–8)
PHOSPHATE SERPL-MCNC: 2.5 MG/DL (ref 2.6–4.7)
PLATELET # BLD AUTO: 320 K/UL (ref 150–400)
PMV BLD AUTO: 9.7 FL (ref 8.9–12.9)
POTASSIUM SERPL-SCNC: 4.4 MMOL/L (ref 3.5–5.1)
PROT SERPL-MCNC: 8.3 G/DL (ref 6.4–8.2)
PROT UR STRIP-MCNC: ABNORMAL MG/DL
RBC # BLD AUTO: 4.68 M/UL (ref 3.8–5.2)
RBC #/AREA URNS HPF: ABNORMAL /HPF (ref 0–5)
RBC MORPH BLD: ABNORMAL
SODIUM SERPL-SCNC: 138 MMOL/L (ref 136–145)
SP GR UR REFRACTOMETRY: 1.02 (ref 1–1.03)
UA: UC IF INDICATED,UAUC: ABNORMAL
UROBILINOGEN UR QL STRIP.AUTO: 0.2 EU/DL (ref 0.2–1)
WBC # BLD AUTO: 15.6 K/UL (ref 3.6–11)
WBC URNS QL MICRO: ABNORMAL /HPF (ref 0–4)
XXWBCSUS: 0

## 2019-02-01 PROCEDURE — 83735 ASSAY OF MAGNESIUM: CPT

## 2019-02-01 PROCEDURE — 74011250636 HC RX REV CODE- 250/636: Performed by: EMERGENCY MEDICINE

## 2019-02-01 PROCEDURE — 36415 COLL VENOUS BLD VENIPUNCTURE: CPT

## 2019-02-01 PROCEDURE — 96361 HYDRATE IV INFUSION ADD-ON: CPT

## 2019-02-01 PROCEDURE — 81025 URINE PREGNANCY TEST: CPT

## 2019-02-01 PROCEDURE — 83690 ASSAY OF LIPASE: CPT

## 2019-02-01 PROCEDURE — 85025 COMPLETE CBC W/AUTO DIFF WBC: CPT

## 2019-02-01 PROCEDURE — 80053 COMPREHEN METABOLIC PANEL: CPT

## 2019-02-01 PROCEDURE — 96374 THER/PROPH/DIAG INJ IV PUSH: CPT

## 2019-02-01 PROCEDURE — 99284 EMERGENCY DEPT VISIT MOD MDM: CPT

## 2019-02-01 PROCEDURE — 87086 URINE CULTURE/COLONY COUNT: CPT

## 2019-02-01 PROCEDURE — 96375 TX/PRO/DX INJ NEW DRUG ADDON: CPT

## 2019-02-01 PROCEDURE — 84100 ASSAY OF PHOSPHORUS: CPT

## 2019-02-01 PROCEDURE — 81001 URINALYSIS AUTO W/SCOPE: CPT

## 2019-02-01 RX ORDER — KETOROLAC TROMETHAMINE 30 MG/ML
30 INJECTION, SOLUTION INTRAMUSCULAR; INTRAVENOUS
Status: COMPLETED | OUTPATIENT
Start: 2019-02-01 | End: 2019-02-01

## 2019-02-01 RX ORDER — ONDANSETRON 8 MG/1
8 TABLET, ORALLY DISINTEGRATING ORAL
Qty: 15 TAB | Refills: 0 | Status: SHIPPED | OUTPATIENT
Start: 2019-02-01 | End: 2019-03-20

## 2019-02-01 RX ORDER — DICYCLOMINE HYDROCHLORIDE 20 MG/1
20 TABLET ORAL EVERY 6 HOURS
Qty: 20 TAB | Refills: 0 | Status: SHIPPED | OUTPATIENT
Start: 2019-02-01 | End: 2019-02-06

## 2019-02-01 RX ADMIN — KETOROLAC TROMETHAMINE 30 MG: 30 INJECTION, SOLUTION INTRAMUSCULAR; INTRAVENOUS at 03:12

## 2019-02-01 RX ADMIN — SODIUM CHLORIDE 1000 ML: 900 INJECTION, SOLUTION INTRAVENOUS at 03:05

## 2019-02-01 RX ADMIN — PROCHLORPERAZINE EDISYLATE 10 MG: 5 INJECTION INTRAMUSCULAR; INTRAVENOUS at 03:12

## 2019-02-01 NOTE — DISCHARGE INSTRUCTIONS
Patient Education        Learning About Colitis  What is colitis? Colitis is swelling (inflammation) of the colon. The colon makes up most of the large intestine. Many conditions can cause colitis. What are some types of colitis? · Infectious colitis is a type that appears suddenly. It's caused by a bacteria or virus, such as salmonella, shigella, or campylobacter. · Ischemic colitis is caused by problems with blood flow to the colon. This can happen after surgery. It can also be caused by other health problems. · Microscopic colitis doesn't always have a clear cause. It can only be found with special tests. · Crohn's disease and ulcerative colitis are lifelong diseases. They can cause swelling, inflammation, and deep sores in the lining of the digestive tract. What are the symptoms? Symptoms may include fever, diarrhea that may be bloody, or belly pain. You may also have an urgent need to move your bowels or pain when you move your bowels. Or you may have bleeding from the rectum or weight loss. Your symptoms may depend on the type of colitis you have. For example, microscopic colitis may cause watery diarrhea. Sometimes symptoms go away on their own. If they don't go away, or if you have bleeding or severe pain, call your doctor right away. How is it diagnosed? You may need blood tests or a stool test. You also may need imaging tests like a CT scan. You may have a colonoscopy so that a doctor can look inside your colon. In some cases, the doctor may want to test a sample of tissue from the intestine. This test is called a biopsy. How is it treated? Treatment for colitis depends on the condition that is causing it. · Antibiotics may be used to treat an infection. · Diet changes may help with symptoms. · Medicines can also help to relieve inflammation and control symptoms. · In some cases, surgery to remove parts of the intestine may be needed.   Follow-up care is a key part of your treatment and safety. Be sure to make and go to all appointments, and call your doctor if you are having problems. It's also a good idea to know your test results and keep a list of the medicines you take. Where can you learn more? Go to http://mamie-sandrita.info/. Enter C130 in the search box to learn more about \"Learning About Colitis. \"  Current as of: March 27, 2018  Content Version: 11.9  © 5002-2930 [employer]. Care instructions adapted under license by [employer] (which disclaims liability or warranty for this information). If you have questions about a medical condition or this instruction, always ask your healthcare professional. [place] [address] any warranty or liability for your use of this information. Patient Education        Oral Rehydration: Care Instructions  Your Care Instructions    Dehydration occurs when your body loses too much water. This can happen if you do not drink enough fluids or lose a lot of fluid due to diarrhea, vomiting, or sweating. Being dehydrated can cause health problems and can even be life-threatening. To replace lost fluids, you need to drink liquid that contains special chemicals called electrolytes. Electrolytes keep your body working well. Plain water does not have electrolytes. You also need to rest to prevent more fluid loss. Replacing water and electrolytes (oral rehydration) completely takes about 36 hours. But you should feel better within a few hours. Follow-up care is a key part of your treatment and safety. Be sure to make and go to all appointments, and call your doctor if you are having problems. It's also a good idea to know your test results and keep a list of the medicines you take. How can you care for yourself at home? · Take frequent sips of a drink such as Gatorade, Powerade, or other rehydration drinks that your doctor suggests.  These replace both fluid and important chemicals (electrolytes) you need for balance in your blood. · Drink 2 quarts of cool liquid over 2 to 4 hours. You should have at least 10 glasses of liquid a day to replace lost fluid. If you have kidney, heart, or liver disease and have to limit fluids, talk with your doctor before you increase the amount of fluids you drink. · Make your own drink. Measure everything carefully. The drink may not work well or may even be harmful if the amounts are off. ? 1 quart water  ? ½ teaspoon salt  ? 6 teaspoons sugar  · Do not drink liquid with caffeine, such as coffee and ian. · Do not drink any alcohol. It can make you dehydrated. · Drink plenty of fluids, enough so that your urine is light yellow or clear like water. If you have kidney, heart, or liver disease and have to limit fluids, talk with your doctor before you increase the amount of fluids you drink. When should you call for help? Call 911 anytime you think you may need emergency care. For example, call if:    · You have signs of severe dehydration, such as:  ? You are confused or unable to stay awake.  ? You passed out (lost consciousness).    Call your doctor now or seek immediate medical care if:    · You still have signs of dehydration. You have sunken eyes and a dry mouth, and you pass only a little dark urine.     · You are dizzy or lightheaded, or you feel like you may faint.     · You are not able to keep down fluids.    Watch closely for changes in your health, and be sure to contact your doctor if:    · You do not get better as expected. Where can you learn more? Go to http://mamie-sandrita.info/. Enter I040 in the search box to learn more about \"Oral Rehydration: Care Instructions. \"  Current as of: September 23, 2018  Content Version: 11.9  © 9402-6229 [employer]. Care instructions adapted under license by [employer] (which disclaims liability or warranty for this information).  If you have questions about a medical condition or this instruction, always ask your healthcare professional. [place] [address] any warranty or liability for your use of this information.

## 2019-02-01 NOTE — LETTER
[facility] EMERGENCY DEPT 
[address] [place] [id number] 
[phone] Work/School Note Date: 2/1/2019 To Whom It May concern: 
 
Ángel Mandel was seen and treated today in the emergency room by the following provider(s): 
Attending Provider: Liv Thomas MD. Ángel Mandel may return to work on 2/02/2019. Sincerely, Doc Carlton MD

## 2019-02-01 NOTE — ED PROVIDER NOTES
The patient is a 34year old female with multiple chronic medical problems, who presents to the ED with a complaint of abdominal cramps accompanied by nausea, vomiting, and diarrhea that began approximately 5-6 hours ago. The patient also complained of lightheadedness and dizziness and feeling faint. She admits to low grade temperature. She denies any headache, neck pain, sore throat, cough, congestion, chest pain, or shortness of with exertion, dysuria, hematuria, vaginal discharge or bleeding, extremity weaknessor numbness, sick contact, skin rash, unusual food sources, recent travel, melena, hematochezia, hematemesis. Past Medical History:  
Diagnosis Date  Anxiety  Calculus of kidney  Depression  Dissociative disorder  Environmental allergies  GERD (gastroesophageal reflux disease)  Hashimoto's thyroiditis 10/24/2011  Hashimoto's thyroiditis 10/24/2011  Hypotension  Hypothyroid 2011  Hypothyroidism  MDD (major depressive disorder)  OCD (obsessive compulsive disorder)  Psychiatric disorder   
 depression --2 yr old daughter  2013  PTSD (post-traumatic stress disorder)  Unspecified adverse effect of anesthesia   
 per patient with epidural had severe N&V and passed out Past Surgical History:  
Procedure Laterality Date  DELIVERY   11  
 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  
Problem Relation Age of Onset  Heart Disease Mother   
     miltral value prolapse  Hypertension Father  Thyroid Disease Maternal Grandmother  Diabetes Paternal Grandmother  Diabetes Maternal Aunt  Cancer Paternal Grandfather   
     throat/stomach cancer,  at age 72  Breast Cancer Other   
     maternal great grandmother Social History Socioeconomic History  Marital status: LEGALLY  Spouse name: Not on file  Number of children: Not on file  Years of education: Not on file  Highest education level: Not on file Social Needs  Financial resource strain: Not on file  Food insecurity - worry: Not on file  Food insecurity - inability: Not on file  Transportation needs - medical: Not on file  Transportation needs - non-medical: Not on file Occupational History  Not on file Tobacco Use  Smoking status: Former Smoker Packs/day: 0.10 Years: 1.00 Pack years: 0.10 Last attempt to quit: 2018 Years since quittin.6  Smokeless tobacco: Never Used Substance and Sexual Activity  Alcohol use: No  
  Comment: social  
 Drug use: No  
 Sexual activity: Yes  
  Partners: Male Birth control/protection: Condom Other Topics Concern  Not on file Social History Narrative  Not on file ALLERGIES: Ancef [cefazolin]; Pcn [penicillins]; Peanut; Chocolate [cocoa]; Citrus and derivatives; [place]; Beef containing products; Chicken derived; Corn; Egg; Milk; Milk prot-turm-pepper-pineappl; Shellfish derived; Soy; and [place] Review of Systems All other systems reviewed and are negative. Vitals:  
 19 0248 BP: 119/74 Pulse: (!) 105 Resp: 16 Temp: (!) 100.5 °F (38.1 °C) SpO2: 100% Weight: 90.1 kg (198 lb 10.2 oz) Height: 5' 2\" (1.575 m) Physical Exam  
Nursing note and vitals reviewed. CONSTITUTIONAL: Well-appearing; well-nourished; in no apparent distress HEAD: Normocephalic; atraumatic EYES: PERRL; EOM intact; conjunctiva and sclera are clear bilaterally. ENT: No rhinorrhea; normal pharynx with no tonsillar hypertrophy; mucous membranes pink/moist, no erythema, no exudate. NECK: Supple; non-tender; no cervical lymphadenopathy CARD: Normal S1, S2; no murmurs, rubs, or gallops. Regular rate and rhythm. RESP: Normal respiratory effort; breath sounds clear and equal bilaterally; no wheezes, rhonchi, or rales. ABD: Normal bowel sounds; non-distended; non-tender; no palpable organomegaly, no masses, no bruits. Back Exam: Normal inspection; no vertebral point tenderness, no CVA tenderness. Normal range of motion. EXT: Normal ROM in all four extremities; non-tender to palpation; no swelling or deformity; distal pulses are normal, no edema. SKIN: Warm; dry; no rash. NEURO:Alert and oriented x 3, coherent, DESTINY-XII grossly intact, sensory and motor are non-focal. 
 
 
 
MDM Number of Diagnoses or Management Options Diagnosis management comments: Assessment: 61-year-old who presents with symptoms consistent with gastroenteritis-acute rule out electrolyte abnormality, and dehydration Plan: Lab/ IV fluid/ antiemetics and analgesia/ p.o. challenge/ serial exam/ monitor and reevaluate Amount and/or Complexity of Data Reviewed Clinical lab tests: ordered and reviewed Tests in the radiology section of CPT®: ordered and reviewed Tests in the medicine section of CPT®: reviewed and ordered Discussion of test results with the performing providers: yes Decide to obtain previous medical records or to obtain history from someone other than the patient: yes Obtain history from someone other than the patient: yes Review and summarize past medical records: yes Discuss the patient with other providers: yes Independent visualization of images, tracings, or specimens: yes Risk of Complications, Morbidity, and/or Mortality Presenting problems: moderate Diagnostic procedures: moderate Management options: moderate Patient Progress Patient progress: stable Progress Note:  
Pt has been reexamined by Leonardo Torres MD. Pt is feeling much better. Symptoms have improved. All available results have been reviewed with pt and any available family. Pt understands sx, dx, and tx in ED. The patient was given p.o. challenge test she tolerated it well.  She denies any further discomfort. Care plan has been outlined and questions have been answered. Pt is ready to go home. Will send home on Acute gastroenteritis instruction and oral rehydration education. Prescription for Bentyl and Zofran. . Outpatient referral with PCP as needed. Written by Danita Butler MD,3:05 AM 
 
. Lauren Rolle

## 2019-02-01 NOTE — ED TRIAGE NOTES
Patient ambulatory to treatment are.a Patient states that she has vomited more than 5 times in the past hour and had 2 episodes of diarrhea. Patient states, \"I feel like I'm going to pass out when I stand up. \"  Patient c/o of nausea now.

## 2019-02-02 LAB
BACTERIA SPEC CULT: NORMAL
CC UR VC: NORMAL
SERVICE CMNT-IMP: NORMAL

## 2019-02-11 ENCOUNTER — TELEPHONE (OUTPATIENT)
Dept: ONCOLOGY | Age: 29
End: 2019-02-11

## 2019-02-11 NOTE — TELEPHONE ENCOUNTER
[address] at [place]  [phone]    02/11/19- Phone call placed to pt to remind pt to have labs drawn prior to her follow up appointment with .  left for patient.

## 2019-02-17 ENCOUNTER — APPOINTMENT (OUTPATIENT)
Dept: CT IMAGING | Age: 29
End: 2019-02-17
Attending: EMERGENCY MEDICINE
Payer: COMMERCIAL

## 2019-02-17 ENCOUNTER — HOSPITAL ENCOUNTER (EMERGENCY)
Age: 29
Discharge: HOME | End: 2019-02-17
Attending: EMERGENCY MEDICINE
Payer: COMMERCIAL

## 2019-02-17 VITALS
WEIGHT: 198 LBS | SYSTOLIC BLOOD PRESSURE: 109 MMHG | BODY MASS INDEX: 36.44 KG/M2 | HEIGHT: 62 IN | HEART RATE: 95 BPM | DIASTOLIC BLOOD PRESSURE: 58 MMHG | OXYGEN SATURATION: 100 % | TEMPERATURE: 98.4 F | RESPIRATION RATE: 16 BRPM

## 2019-02-17 DIAGNOSIS — R07.89 ATYPICAL CHEST PAIN: Primary | ICD-10-CM

## 2019-02-17 LAB
ALBUMIN SERPL-MCNC: 3.6 G/DL (ref 3.5–5)
ALBUMIN/GLOB SERPL: 0.9 {RATIO} (ref 1.1–2.2)
ALP SERPL-CCNC: 68 U/L (ref 45–117)
ALT SERPL-CCNC: 28 U/L (ref 12–78)
ANION GAP SERPL CALC-SCNC: 8 MMOL/L (ref 5–15)
AST SERPL-CCNC: 18 U/L (ref 15–37)
BASOPHILS # BLD: 0.1 K/UL (ref 0–0.1)
BASOPHILS NFR BLD: 1 % (ref 0–1)
BILIRUB SERPL-MCNC: 0.2 MG/DL (ref 0.2–1)
BUN SERPL-MCNC: 10 MG/DL (ref 6–20)
BUN/CREAT SERPL: 13 (ref 12–20)
CALCIUM SERPL-MCNC: 8.6 MG/DL (ref 8.5–10.1)
CHLORIDE SERPL-SCNC: 104 MMOL/L (ref 97–108)
CO2 SERPL-SCNC: 26 MMOL/L (ref 21–32)
CREAT SERPL-MCNC: 0.78 MG/DL (ref 0.55–1.02)
D DIMER PPP FEU-MCNC: 0.67 MG/L FEU (ref 0–0.65)
DIFFERENTIAL METHOD BLD: ABNORMAL
EOSINOPHIL # BLD: 0.1 K/UL (ref 0–0.4)
EOSINOPHIL NFR BLD: 1 % (ref 0–7)
ERYTHROCYTE [DISTWIDTH] IN BLOOD BY AUTOMATED COUNT: 15.5 % (ref 11.5–14.5)
GLOBULIN SER CALC-MCNC: 3.9 G/DL (ref 2–4)
GLUCOSE SERPL-MCNC: 90 MG/DL (ref 65–100)
HCG UR QL: NEGATIVE
HCT VFR BLD AUTO: 35 % (ref 35–47)
HGB BLD-MCNC: 11.1 G/DL (ref 11.5–16)
LYMPHOCYTES # BLD: 2.3 K/UL (ref 0.8–3.5)
LYMPHOCYTES NFR BLD: 26 % (ref 12–49)
MCH RBC QN AUTO: 25.7 PG (ref 26–34)
MCHC RBC AUTO-ENTMCNC: 31.7 G/DL (ref 30–36.5)
MCV RBC AUTO: 81 FL (ref 80–99)
MONOCYTES # BLD: 0.9 K/UL (ref 0–1)
MONOCYTES NFR BLD: 10 % (ref 5–13)
NEUTS SEG # BLD: 5.6 K/UL (ref 1.8–8)
NEUTS SEG NFR BLD: 62 % (ref 32–75)
PLATELET # BLD AUTO: 345 K/UL (ref 150–400)
PMV BLD AUTO: 9.7 FL (ref 8.9–12.9)
POTASSIUM SERPL-SCNC: 3.6 MMOL/L (ref 3.5–5.1)
PROT SERPL-MCNC: 7.5 G/DL (ref 6.4–8.2)
RBC # BLD AUTO: 4.32 M/UL (ref 3.8–5.2)
SODIUM SERPL-SCNC: 138 MMOL/L (ref 136–145)
TROPONIN I SERPL-MCNC: <0.05 NG/ML
WBC # BLD AUTO: 8.9 K/UL (ref 3.6–11)
XXWBCSUS: 0

## 2019-02-17 PROCEDURE — 74011636320 HC RX REV CODE- 636/320: Performed by: EMERGENCY MEDICINE

## 2019-02-17 PROCEDURE — 81025 URINE PREGNANCY TEST: CPT

## 2019-02-17 PROCEDURE — 93005 ELECTROCARDIOGRAM TRACING: CPT

## 2019-02-17 PROCEDURE — 85379 FIBRIN DEGRADATION QUANT: CPT

## 2019-02-17 PROCEDURE — 36415 COLL VENOUS BLD VENIPUNCTURE: CPT

## 2019-02-17 PROCEDURE — 71275 CT ANGIOGRAPHY CHEST: CPT

## 2019-02-17 PROCEDURE — 85025 COMPLETE CBC W/AUTO DIFF WBC: CPT

## 2019-02-17 PROCEDURE — 84484 ASSAY OF TROPONIN QUANT: CPT

## 2019-02-17 PROCEDURE — 99285 EMERGENCY DEPT VISIT HI MDM: CPT

## 2019-02-17 PROCEDURE — 80053 COMPREHEN METABOLIC PANEL: CPT

## 2019-02-17 RX ORDER — SODIUM CHLORIDE 0.9 % (FLUSH) 0.9 %
10 SYRINGE (ML) INJECTION
Status: DISCONTINUED | OUTPATIENT
Start: 2019-02-17 | End: 2019-02-17 | Stop reason: HOSPADM

## 2019-02-17 RX ADMIN — IOPAMIDOL 100 ML: 755 INJECTION, SOLUTION INTRAVENOUS at 13:57

## 2019-02-17 NOTE — ED PROVIDER NOTES
The history is provided by the patient. Shortness of Breath This is a new problem. Episode onset: about a week ago. The problem has not changed since onset. Associated symptoms include cough and chest pain. Pertinent negatives include no fever, no sputum production, no hemoptysis and no wheezing. Associated medical issues include PE. Past Medical History:  
Diagnosis Date  Anxiety  Calculus of kidney  Depression  Dissociative disorder  Environmental allergies  GERD (gastroesophageal reflux disease)  Hashimoto's thyroiditis 10/24/2011  Hashimoto's thyroiditis 10/24/2011  Hypotension  Hypothyroid 2011  Hypothyroidism  MDD (major depressive disorder)  OCD (obsessive compulsive disorder)  Psychiatric disorder   
 depression --2 yr old daughter  2013  PTSD (post-traumatic stress disorder)  Unspecified adverse effect of anesthesia   
 per patient with epidural had severe N&V and passed out Past Surgical History:  
Procedure Laterality Date  DELIVERY   11  
 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  
Problem Relation Age of Onset  Heart Disease Mother   
     miltral value prolapse  Hypertension Father  Thyroid Disease Maternal Grandmother  Diabetes Paternal Grandmother  Diabetes Maternal Aunt  Cancer Paternal Grandfather   
     throat/stomach cancer,  at age 72  Breast Cancer Other   
     maternal great grandmother Social History Socioeconomic History  Marital status: LEGALLY  Spouse name: Not on file  Number of children: Not on file  Years of education: Not on file  Highest education level: Not on file Social Needs  Financial resource strain: Not on file  Food insecurity - worry: Not on file  Food insecurity - inability: Not on file  Transportation needs - medical: Not on file  Transportation needs - non-medical: Not on file Occupational History  Not on file Tobacco Use  Smoking status: Former Smoker Packs/day: 0.10 Years: 1.00 Pack years: 0.10 Last attempt to quit: 2018 Years since quittin.7  Smokeless tobacco: Never Used Substance and Sexual Activity  Alcohol use: No  
  Comment: social  
 Drug use: No  
 Sexual activity: Yes  
  Partners: Male Birth control/protection: Condom Other Topics Concern  Not on file Social History Narrative  Not on file ALLERGIES: Ancef [cefazolin]; Pcn [penicillins]; Peanut; Chocolate [cocoa]; Citrus and derivatives; Sushma Martinez; Beef containing products; Chicken derived; Corn; Egg; Milk; Milk prot-turm-pepper-pineappl; Shellfish derived; Soy; and [place] Review of Systems Constitutional: Negative for chills and fever. Respiratory: Positive for cough and shortness of breath. Negative for hemoptysis, sputum production, chest tightness and wheezing. Dry cough Cardiovascular: Positive for chest pain. Feels like when she had a PE All other systems reviewed and are negative. Vitals:  
 19 1315 19 1329 19 1346 19 1350 BP: 119/53  117/61 Pulse: 89 96 (!) 102 94 Resp: 18 16 16 16 Temp:      
SpO2: 100% 100% 100% 100% Weight:      
Height:      
      
 
Physical Exam  
Constitutional: She is oriented to person, place, and time. She appears well-developed and well-nourished. No distress. HENT:  
Head: Normocephalic and atraumatic. Mouth/Throat: Oropharynx is clear and moist.  
Eyes: Conjunctivae and EOM are normal.  
Neck: Normal range of motion. Cardiovascular: Normal rate, regular rhythm, normal heart sounds and intact distal pulses. No murmur heard. Pulmonary/Chest: Effort normal and breath sounds normal. No stridor. No respiratory distress. She has no wheezes. Abdominal: Soft. Bowel sounds are normal. There is no tenderness. Musculoskeletal: Normal range of motion. She exhibits no edema, tenderness or deformity. Neurological: She is alert and oriented to person, place, and time. Coordination normal.  
Skin: Skin is warm and dry. She is not diaphoretic. Psychiatric: She has a normal mood and affect. Nursing note and vitals reviewed. MDM Number of Diagnoses or Management Options Atypical chest pain:  
Diagnosis management comments: Patient with chest pain and SOB and cough - worried she has another blood clot. Will check labs including d-dimer and if neg will not get CTA as to try to decrease radiation exposure. If d-dimer elevated get CTA and re-eval 
 
  
Amount and/or Complexity of Data Reviewed Clinical lab tests: reviewed and ordered Tests in the radiology section of CPT®: reviewed and ordered Procedures VITALS:  
Patient Vitals for the past 8 hrs: 
 Temp Pulse Resp BP SpO2  
02/17/19 1350  94 16  100 % 02/17/19 1346  (!) 102 16 117/61 100 % 02/17/19 1329  96 16  100 % 02/17/19 1315  89 18 119/53 100 % 02/17/19 1300  89 23 102/55 100 % 02/17/19 1248 98.4 °F (36.9 °C) 93 18 110/64 99 % 02/17/19 1245  87 14 111/56 100 % 02/17/19 2209 [facility] Recent Results (from the past 24 hour(s)) EKG, 12 LEAD, INITIAL Collection Time: 02/17/19 12:34 PM  
Result Value Ref Range Ventricular Rate 84 BPM  
 Atrial Rate 84 BPM  
 P-R Interval 148 ms QRS Duration 94 ms Q-T Interval 376 ms QTC Calculation (Bezet) 444 ms Calculated P Axis 48 degrees Calculated R Axis -4 degrees Calculated T Axis 39 degrees Diagnosis Normal sinus rhythm Possible Left atrial enlargement Incomplete right bundle branch block Left ventricular hypertrophy Abnormal ECG When compared with ECG of 03-DEC-2018 19:30, No significant change was found CBC WITH AUTOMATED DIFF  
 Collection Time: 02/17/19 12:37 PM  
Result Value Ref Range WBC 8.9 3.6 - 11.0 K/uL  
 RBC 4.32 3.80 - 5.20 M/uL  
 HGB 11.1 (L) 11.5 - 16.0 g/dL HCT 35.0 35.0 - 47.0 % MCV 81.0 80.0 - 99.0 FL  
 MCH 25.7 (L) 26.0 - 34.0 PG  
 MCHC 31.7 30.0 - 36.5 g/dL  
 RDW 15.5 (H) 11.5 - 14.5 % PLATELET 980 918 - 562 K/uL MPV 9.7 8.9 - 12.9 FL  
 NEUTROPHILS 62 32 - 75 % LYMPHOCYTES 26 12 - 49 % MONOCYTES 10 5 - 13 % EOSINOPHILS 1 0 - 7 % BASOPHILS 1 0 - 1 %  
 ABS. NEUTROPHILS 5.6 1.8 - 8.0 K/UL  
 ABS. LYMPHOCYTES 2.3 0.8 - 3.5 K/UL  
 ABS. MONOCYTES 0.9 0.0 - 1.0 K/UL  
 ABS. EOSINOPHILS 0.1 0.0 - 0.4 K/UL  
 ABS. BASOPHILS 0.1 0.0 - 0.1 K/UL  
 DF AUTOMATED XXWBCSUS 0    
METABOLIC PANEL, COMPREHENSIVE Collection Time: 02/17/19 12:37 PM  
Result Value Ref Range Sodium 138 136 - 145 mmol/L Potassium 3.6 3.5 - 5.1 mmol/L Chloride 104 97 - 108 mmol/L  
 CO2 26 21 - 32 mmol/L Anion gap 8 5 - 15 mmol/L Glucose 90 65 - 100 mg/dL BUN 10 6 - 20 MG/DL Creatinine 0.78 0.55 - 1.02 MG/DL  
 BUN/Creatinine ratio 13 12 - 20 GFR est AA >60 >60 ml/min/1.73m2 GFR est non-AA >60 >60 ml/min/1.73m2 Calcium 8.6 8.5 - 10.1 MG/DL Bilirubin, total 0.2 0.2 - 1.0 MG/DL  
 ALT (SGPT) 28 12 - 78 U/L  
 AST (SGOT) 18 15 - 37 U/L Alk. phosphatase 68 45 - 117 U/L Protein, total 7.5 6.4 - 8.2 g/dL Albumin 3.6 3.5 - 5.0 g/dL Globulin 3.9 2.0 - 4.0 g/dL A-G Ratio 0.9 (L) 1.1 - 2.2    
TROPONIN I Collection Time: 02/17/19 12:37 PM  
Result Value Ref Range Troponin-I, Qt. <0.05 <0.05 ng/mL D DIMER Collection Time: 02/17/19 12:37 PM  
Result Value Ref Range D-dimer 0.67 (H) 0.00 - 0.65 mg/L FEU  
HCG URINE, QL. - POC Collection Time: 02/17/19 12:48 PM  
Result Value Ref Range Pregnancy test,urine (POC) NEGATIVE  NEG    
 
 
1400 - patient was re-evaluated prior to discharge, no EMC found, d/c home to f/u with her doctors

## 2019-02-17 NOTE — DISCHARGE INSTRUCTIONS

## 2019-02-17 NOTE — ED TRIAGE NOTES
Patient ambulatory to ED treatment area with steady gait, for complaint of \"I have had a dry cough for the past week. My chest pain started along with the shortness of breath, about a week ago. I had a PE last July. I take 325 aspirin everyday. \" Reports that this does feel similar to when she had her blood clot previously.

## 2019-02-17 NOTE — ED NOTES
Purposeful rounding completed. Toileting offered, ongoing plan of care discussed and pts concerns/questions addressed. Pt informed of time factors with lab/imaging study results. Pt resting on the stretcher in a position of comfort. Call bell within reach; will continue to monitor.

## 2019-02-17 NOTE — ED NOTES
Dr. Cuca Foss gave dischagre instructions to patient. No further questions at this time. Pain reassessed. IV removed without difficulty.

## 2019-02-17 NOTE — ED NOTES
Ensured patient aware of ordered test along with lab results. Patient verbalized that she has been updated on plan of care. Denies need to use restroom at this time. Denies any other needs at this time including warm blanket. Call bell within reach. Will continue to monitor.

## 2019-02-18 LAB
ATRIAL RATE: 84 BPM
CALCULATED P AXIS, ECG09: 48 DEGREES
CALCULATED R AXIS, ECG10: -4 DEGREES
CALCULATED T AXIS, ECG11: 39 DEGREES
DIAGNOSIS, 93000: NORMAL
P-R INTERVAL, ECG05: 148 MS
Q-T INTERVAL, ECG07: 376 MS
QRS DURATION, ECG06: 94 MS
QTC CALCULATION (BEZET), ECG08: 444 MS
VENTRICULAR RATE, ECG03: 84 BPM

## 2019-02-20 PROBLEM — I26.99 PULMONARY EMBOLISM (HCC): Chronic | Status: ACTIVE | Noted: 2018-07-03

## 2019-03-20 ENCOUNTER — HOSPITAL ENCOUNTER (EMERGENCY)
Age: 29
Discharge: HOME | End: 2019-03-20
Attending: STUDENT IN AN ORGANIZED HEALTH CARE EDUCATION/TRAINING PROGRAM
Payer: COMMERCIAL

## 2019-03-20 VITALS
OXYGEN SATURATION: 99 % | HEIGHT: 62 IN | SYSTOLIC BLOOD PRESSURE: 109 MMHG | HEART RATE: 97 BPM | DIASTOLIC BLOOD PRESSURE: 70 MMHG | RESPIRATION RATE: 16 BRPM | WEIGHT: 205.69 LBS | BODY MASS INDEX: 37.85 KG/M2 | TEMPERATURE: 98 F

## 2019-03-20 DIAGNOSIS — R11.2 NON-INTRACTABLE VOMITING WITH NAUSEA, UNSPECIFIED VOMITING TYPE: Primary | ICD-10-CM

## 2019-03-20 DIAGNOSIS — J02.9 SORE THROAT: ICD-10-CM

## 2019-03-20 DIAGNOSIS — K21.9 GASTROESOPHAGEAL REFLUX DISEASE, ESOPHAGITIS PRESENCE NOT SPECIFIED: ICD-10-CM

## 2019-03-20 LAB
ALBUMIN SERPL-MCNC: 3.9 G/DL (ref 3.5–5)
ALBUMIN/GLOB SERPL: 1 {RATIO} (ref 1.1–2.2)
ALP SERPL-CCNC: 74 U/L (ref 45–117)
ALT SERPL-CCNC: 27 U/L (ref 12–78)
ANION GAP SERPL CALC-SCNC: 9 MMOL/L (ref 5–15)
APPEARANCE UR: ABNORMAL
AST SERPL-CCNC: 18 U/L (ref 15–37)
BACTERIA URNS QL MICRO: ABNORMAL /HPF
BASOPHILS # BLD: 0 K/UL (ref 0–0.1)
BASOPHILS NFR BLD: 0 % (ref 0–1)
BILIRUB SERPL-MCNC: 0.3 MG/DL (ref 0.2–1)
BILIRUB UR QL: NEGATIVE
BUN SERPL-MCNC: 15 MG/DL (ref 6–20)
BUN/CREAT SERPL: 21 (ref 12–20)
CALCIUM SERPL-MCNC: 8.8 MG/DL (ref 8.5–10.1)
CHLORIDE SERPL-SCNC: 102 MMOL/L (ref 97–108)
CO2 SERPL-SCNC: 27 MMOL/L (ref 21–32)
COLOR UR: ABNORMAL
CREAT SERPL-MCNC: 0.7 MG/DL (ref 0.55–1.02)
DIFFERENTIAL METHOD BLD: ABNORMAL
EOSINOPHIL # BLD: 0 K/UL (ref 0–0.4)
EOSINOPHIL NFR BLD: 0 % (ref 0–7)
EPITH CASTS URNS QL MICRO: ABNORMAL /LPF
ERYTHROCYTE [DISTWIDTH] IN BLOOD BY AUTOMATED COUNT: 15.6 % (ref 11.5–14.5)
GLOBULIN SER CALC-MCNC: 4 G/DL (ref 2–4)
GLUCOSE SERPL-MCNC: 91 MG/DL (ref 65–100)
GLUCOSE UR STRIP.AUTO-MCNC: NEGATIVE MG/DL
HCG UR QL: NEGATIVE
HCT VFR BLD AUTO: 36 % (ref 35–47)
HETEROPH AB SER QL: NEGATIVE
HGB BLD-MCNC: 11.4 G/DL (ref 11.5–16)
HGB UR QL STRIP: NEGATIVE
KETONES UR QL STRIP.AUTO: NEGATIVE MG/DL
LEUKOCYTE ESTERASE UR QL STRIP.AUTO: NEGATIVE
LYMPHOCYTES # BLD: 0.6 K/UL (ref 0.8–3.5)
LYMPHOCYTES NFR BLD: 5 % (ref 12–49)
MCH RBC QN AUTO: 26 PG (ref 26–34)
MCHC RBC AUTO-ENTMCNC: 31.7 G/DL (ref 30–36.5)
MCV RBC AUTO: 82.2 FL (ref 80–99)
MONOCYTES # BLD: 0.3 K/UL (ref 0–1)
MONOCYTES NFR BLD: 3 % (ref 5–13)
NEUTS SEG # BLD: 10.7 K/UL (ref 1.8–8)
NEUTS SEG NFR BLD: 92 % (ref 32–75)
NITRITE UR QL STRIP.AUTO: NEGATIVE
PH UR STRIP: 7.5 [PH] (ref 5–8)
PLATELET # BLD AUTO: 307 K/UL (ref 150–400)
PMV BLD AUTO: 10 FL (ref 8.9–12.9)
POTASSIUM SERPL-SCNC: 4.2 MMOL/L (ref 3.5–5.1)
PROT SERPL-MCNC: 7.9 G/DL (ref 6.4–8.2)
PROT UR STRIP-MCNC: NEGATIVE MG/DL
RBC # BLD AUTO: 4.38 M/UL (ref 3.8–5.2)
RBC #/AREA URNS HPF: ABNORMAL /HPF (ref 0–5)
RBC MORPH BLD: ABNORMAL
SODIUM SERPL-SCNC: 138 MMOL/L (ref 136–145)
SP GR UR REFRACTOMETRY: 1.01 (ref 1–1.03)
UR CULT HOLD, URHOLD: NORMAL
UROBILINOGEN UR QL STRIP.AUTO: 0.2 EU/DL (ref 0.2–1)
WBC # BLD AUTO: 11.6 K/UL (ref 3.6–11)
WBC URNS QL MICRO: ABNORMAL /HPF (ref 0–4)
YEAST URNS QL MICRO: PRESENT

## 2019-03-20 PROCEDURE — 96374 THER/PROPH/DIAG INJ IV PUSH: CPT

## 2019-03-20 PROCEDURE — 80053 COMPREHEN METABOLIC PANEL: CPT

## 2019-03-20 PROCEDURE — 74011000250 HC RX REV CODE- 250: Performed by: NURSE PRACTITIONER

## 2019-03-20 PROCEDURE — 85025 COMPLETE CBC W/AUTO DIFF WBC: CPT

## 2019-03-20 PROCEDURE — 86308 HETEROPHILE ANTIBODY SCREEN: CPT

## 2019-03-20 PROCEDURE — 74011250637 HC RX REV CODE- 250/637: Performed by: NURSE PRACTITIONER

## 2019-03-20 PROCEDURE — 99283 EMERGENCY DEPT VISIT LOW MDM: CPT

## 2019-03-20 PROCEDURE — 74011250636 HC RX REV CODE- 250/636: Performed by: NURSE PRACTITIONER

## 2019-03-20 PROCEDURE — 81001 URINALYSIS AUTO W/SCOPE: CPT

## 2019-03-20 PROCEDURE — 81025 URINE PREGNANCY TEST: CPT

## 2019-03-20 PROCEDURE — 96361 HYDRATE IV INFUSION ADD-ON: CPT

## 2019-03-20 RX ORDER — ONDANSETRON 2 MG/ML
4 INJECTION INTRAMUSCULAR; INTRAVENOUS
Status: COMPLETED | OUTPATIENT
Start: 2019-03-20 | End: 2019-03-20

## 2019-03-20 RX ORDER — FAMOTIDINE 20 MG/1
20 TABLET, FILM COATED ORAL 2 TIMES DAILY
Qty: 20 TAB | Refills: 0 | Status: SHIPPED | OUTPATIENT
Start: 2019-03-20 | End: 2019-03-30

## 2019-03-20 RX ORDER — CITALOPRAM 40 MG/1
40 TABLET, FILM COATED ORAL DAILY
COMMUNITY
End: 2019-05-09 | Stop reason: SDUPTHER

## 2019-03-20 RX ORDER — ONDANSETRON 4 MG/1
4 TABLET, ORALLY DISINTEGRATING ORAL
Qty: 30 TAB | Refills: 0 | Status: SHIPPED | OUTPATIENT
Start: 2019-03-20 | End: 2019-05-05 | Stop reason: SDUPTHER

## 2019-03-20 RX ADMIN — SODIUM CHLORIDE 1000 ML: 900 INJECTION, SOLUTION INTRAVENOUS at 14:04

## 2019-03-20 RX ADMIN — ONDANSETRON 4 MG: 2 INJECTION INTRAMUSCULAR; INTRAVENOUS at 14:16

## 2019-03-20 RX ADMIN — LIDOCAINE HYDROCHLORIDE 40 ML: 20 SOLUTION ORAL; TOPICAL at 14:16

## 2019-03-20 NOTE — ED NOTES
Plan of care and all test/meds ordered explained to pt. Good understanding and agreement with plan was verbalized. IVF infusing via gravity without difficulty. Call bell within reach.

## 2019-03-20 NOTE — ED PROVIDER NOTES
Patient is a 82-year-old female with a past medical history significant for anxiety, kidney stone, depression, GERD, thyroiditis, hypotension, OCD, PTSD who is ambulatory to the ED today with complaints of abdominal pain, nausea, vomiting and sore throat. Patient states for the last 3 days she has had a mild sore throat. This seems to be improving. Today she developed epigastric abdominal pain, nausea and vomiting. Patient states she vomited up the food that she ate yesterday evening. She notes no one else in her family has been sick with GI symptoms. Patient notes her daughter has had flu and strep recently. Patient notes in addition to the epigastric abdominal pain having a mildly scratchy throat and a mild headache. Patient denies fever, chills, chest pain or shortness of breath. Patient's has no further complaints at this time. Primary care provider:Michelle Moreau MD 
 
 
The history is provided by the patient. No  was used. Past Medical History:  
Diagnosis Date  Anxiety  Calculus of kidney  Depression  Dissociative disorder  Environmental allergies  GERD (gastroesophageal reflux disease)  Hashimoto's thyroiditis 10/24/2011  Hashimoto's thyroiditis 10/24/2011  Hypotension  Hypothyroid 2011  Hypothyroidism  MDD (major depressive disorder)  OCD (obsessive compulsive disorder)  Psychiatric disorder   
 depression --2 yr old daughter  2013  PTSD (post-traumatic stress disorder)  Unspecified adverse effect of anesthesia   
 per patient with epidural had severe N&V and passed out Past Surgical History:  
Procedure Laterality Date  DELIVERY   11  
 1 LTCS by Hernán Howard, congenital anomaly  HX  SECTION  2016 Family History:  
Problem Relation Age of Onset  Heart Disease Mother   
     miltral value prolapse  Hypertension Father  Thyroid Disease Maternal Grandmother  Diabetes Paternal Grandmother  Diabetes Maternal Aunt  Cancer Paternal Grandfather   
     throat/stomach cancer,  at age 72  Breast Cancer Other   
     maternal great grandmother Social History Socioeconomic History  Marital status: LEGALLY  Spouse name: Not on file  Number of children: Not on file  Years of education: Not on file  Highest education level: Not on file Occupational History  Not on file Social Needs  Financial resource strain: Not on file  Food insecurity:  
  Worry: Not on file Inability: Not on file  Transportation needs:  
  Medical: Not on file Non-medical: Not on file Tobacco Use  Smoking status: Former Smoker Packs/day: 0.10 Years: 1.00 Pack years: 0.10 Last attempt to quit: 2018 Years since quittin.8  Smokeless tobacco: Never Used Substance and Sexual Activity  Alcohol use: No  
  Comment: social  
 Drug use: No  
 Sexual activity: Yes  
  Partners: Male Birth control/protection: Condom Lifestyle  Physical activity:  
  Days per week: Not on file Minutes per session: Not on file  Stress: Not on file Relationships  Social connections:  
  Talks on phone: Not on file Gets together: Not on file Attends Restorationist service: Not on file Active member of club or organization: Not on file Attends meetings of clubs or organizations: Not on file Relationship status: Not on file  Intimate partner violence:  
  Fear of current or ex partner: Not on file Emotionally abused: Not on file Physically abused: Not on file Forced sexual activity: Not on file Other Topics Concern  Not on file Social History Narrative  Not on file ALLERGIES: Ancef [cefazolin]; Pcn [penicillins]; Peanut; Chocolate [cocoa]; Citrus and derivatives; [place] ; Beef containing products;  Chicken derived; Corn; Egg; Milk; Milk prot-turm-pepper-pineappl; Shellfish derived; Soy; and [place] Review of Systems Constitutional: Negative for appetite change, chills and fever. HENT: Positive for sore throat. Negative for congestion, ear pain, rhinorrhea, trouble swallowing and voice change. Respiratory: Negative for cough, shortness of breath and wheezing. Cardiovascular: Negative for chest pain. Gastrointestinal: Positive for abdominal pain, nausea and vomiting. Negative for constipation and diarrhea. Genitourinary: Negative for dysuria and urgency. Musculoskeletal: Negative for back pain. Skin: Negative for color change and rash. Neurological: Negative for dizziness and headaches. Psychiatric/Behavioral: Negative. All other systems reviewed and are negative. Vitals:  
 03/20/19 1337 BP: 128/64 Pulse: 97 Resp: 17 Temp: 98 °F (36.7 °C) SpO2: 97% Weight: 93.3 kg (205 lb 11 oz) Height: 5' 2\" (1.575 m) Physical Exam  
Constitutional: She is oriented to person, place, and time. She appears well-developed and well-nourished. HENT:  
Head: Normocephalic and atraumatic. Neck: Normal range of motion. Neck supple. Cardiovascular: Normal rate, regular rhythm, normal heart sounds and intact distal pulses. Pulmonary/Chest: Effort normal and breath sounds normal. No respiratory distress. She has no wheezes. She has no rales. She exhibits no tenderness. Abdominal: Soft. Normal appearance and bowel sounds are normal. There is tenderness in the epigastric area. There is no guarding. Musculoskeletal: Normal range of motion. Neurological: She is alert and oriented to person, place, and time. Skin: Skin is warm and dry. No erythema. Psychiatric: She has a normal mood and affect. Her behavior is normal. Judgment and thought content normal.  
Nursing note and vitals reviewed. [facility] Procedures Assessment & Plan:  
 
Orders Placed This Encounter  URINE CULTURE HOLD SAMPLE  
 Hold Sample  CBC WITH AUTOMATED DIFF  
 METABOLIC PANEL, COMPREHENSIVE  
 URINALYSIS W/MICROSCOPIC  
 MONONUCLEOSIS SCREEN  
 POC URINE PREGNANCY TEST  
 HCG URINE, QL. - POC  citalopram (CELEXA) 40 mg tablet  sodium chloride 0.9 % bolus infusion 1,000 mL  ondansetron (ZOFRAN) injection 4 mg  mylanta/viscous lidocaine (GI COCKTAIL) Discussed with Darrell Jackson MD,ED Provider Geraldo Valadez NP 
03/20/19 
2:08 PM 
 
 
Awaiting UA. Labs unremarkable. Likely GERD symptoms. Geraldo Valadez NP 
03/20/19 
3:44 PM 
 
UA unremarkable. Zofran and Pepcid for home. Discussed return precautions. 4:04 PM 
The patient has been reevaluated. The patient is ready for discharge. The patient's signs, symptoms, diagnosis, and discharge instructions have been discussed and the patient/ family has conveyed their understanding. The patient is to follow up as recommended or return to the ED should their symptoms worsen. Plan has been discussed and the patient is in agreement. LABORATORY TESTS: 
Labs Reviewed CBC WITH AUTOMATED DIFF - Abnormal; Notable for the following components:  
    Result Value WBC 11.6 (*) HGB 11.4 (*)   
 RDW 15.6 (*) NEUTROPHILS 92 (*) LYMPHOCYTES 5 (*) MONOCYTES 3 (*)   
 ABS. NEUTROPHILS 10.7 (*)   
 ABS. LYMPHOCYTES 0.6 (*) All other components within normal limits METABOLIC PANEL, COMPREHENSIVE - Abnormal; Notable for the following components: BUN/Creatinine ratio 21 (*) A-G Ratio 1.0 (*) All other components within normal limits URINALYSIS W/MICROSCOPIC - Abnormal; Notable for the following components:  
 Appearance HAZY (*) Epithelial cells MODERATE (*) Bacteria 2+ (*) Yeast PRESENT (*) All other components within normal limits URINE CULTURE HOLD SAMPLE  
SAMPLES BEING HELD  
MONONUCLEOSIS SCREEN  
HCG URINE, QL. - POC IMAGING RESULTS: 
No results found. MEDICATIONS GIVEN: 
Medications  
sodium chloride 0.9 % bolus infusion 1,000 mL (0 mL IntraVENous IV Completed 3/20/19 1547) ondansetron [facility] injection 4 mg (4 mg IntraVENous Given 3/20/19 1416)  
mylanta/viscous lidocaine (GI COCKTAIL) (40 mL Oral Given 3/20/19 1416) IMPRESSION: 
1. Non-intractable vomiting with nausea, unspecified vomiting type 2. Sore throat 3. Gastroesophageal reflux disease, esophagitis presence not specified PLAN: 
1. Current Discharge Medication List  
  
START taking these medications Details  
famotidine (PEPCID) 20 mg tablet Take 1 Tab by mouth two (2) times a day for 10 days. Qty: 20 Tab, Refills: 0 CONTINUE these medications which have CHANGED Details  
ondansetron (ZOFRAN ODT) 4 mg disintegrating tablet Take 1 Tab by mouth every eight (8) hours as needed for Nausea. Qty: 30 Tab, Refills: 0  
  
  
 
2. Follow-up Information Follow up With Specialties Details Why Contact Info Bere Barrera MD Family Practice Schedule an appointment as soon as possible for a visit ER follow-up in [facility] next 5-7 days [address] [place] [address] [address]  
[phone] [facility] EMERGENCY DEPT Emergency Medicine  As needed, [facility] symptoms wor[facility] [address] [facility] 
[phone] 3. Return to ED for new or worsening symptoms Del Hernandez NP

## 2019-03-20 NOTE — ED TRIAGE NOTES
Pt rpts sore throat for the past 3 days. Pt rpts nausea today with one episode of vomiting. Low grade temp 99.

## 2019-03-20 NOTE — DISCHARGE INSTRUCTIONS
Thank you for allowing us to care for you today. Please follow-up with your Primary Care provider in the next 2-3 days if your symptoms do not improve. Plan for home:     Zofran ever 6-8 hours as needed for nausea. Pepcid twice daily for 10 days. Nausea/vomiting: For the next few days watch what you eat. Eat a bland diet. Fluids are the most important. Gatorade, powerade, water are the best fluids to drink. BRAT Diet: Bananas, Rice, Applesauce, Tea/Toast.  Add foods back slowly and as you tolerate. The last type of foods to add back will be salads, acetic foods like citrus, tomatoes and spicy foods. Come back to the ER if you have worsening symptoms, fevers over 100.9, shaking chills, nausea or vomiting. Patient Education      Oral Rehydration: Care Instructions  Your Care Instructions    Dehydration occurs when your body loses too much water. This can happen if you do not drink enough fluids or lose a lot of fluid due to diarrhea, vomiting, or sweating. Being dehydrated can cause health problems and can even be life-threatening. To replace lost fluids, you need to drink liquid that contains special chemicals called electrolytes. Electrolytes keep your body working well. Plain water does not have electrolytes. You also need to rest to prevent more fluid loss. Replacing water and electrolytes (oral rehydration) completely takes about 36 hours. But you should feel better within a few hours. Follow-up care is a key part of your treatment and safety. Be sure to make and go to all appointments, and call your doctor if you are having problems. It's also a good idea to know your test results and keep a list of the medicines you take. How can you care for yourself at home? · Take frequent sips of a drink such as Gatorade, Powerade, or other rehydration drinks that your doctor suggests. These replace both fluid and important chemicals (electrolytes) you need for balance in your blood.   · Drink 2 quarts of cool liquid over 2 to 4 hours. You should have at least 10 glasses of liquid a day to replace lost fluid. If you have kidney, heart, or liver disease and have to limit fluids, talk with your doctor before you increase the amount of fluids you drink. · Make your own drink. Measure everything carefully. The drink may not work well or may even be harmful if the amounts are off. ? 1 quart water  ? ½ teaspoon salt  ? 6 teaspoons sugar  · Do not drink liquid with caffeine, such as coffee and ian. · Do not drink any alcohol. It can make you dehydrated. · Drink plenty of fluids, enough so that your urine is light yellow or clear like water. If you have kidney, heart, or liver disease and have to limit fluids, talk with your doctor before you increase the amount of fluids you drink. When should you call for help? Call 911 anytime you think you may need emergency care. For example, call if:    · You have signs of severe dehydration, such as:  ? You are confused or unable to stay awake.  ? You passed out (lost consciousness).    Call your doctor now or seek immediate medical care if:    · You still have signs of dehydration. You have sunken eyes and a dry mouth, and you pass only a little dark urine.     · You are dizzy or lightheaded, or you feel like you may faint.     · You are not able to keep down fluids.    Watch closely for changes in your health, and be sure to contact your doctor if:    · You do not get better as expected. Where can you learn more? Go to http://mamie-sandrita.info/. Enter I040 in the search box to learn more about \"Oral Rehydration: Care Instructions. \"  Current as of: September 23, 2018  Content Version: 11.9  © 0999-1029 [employer]. Care instructions adapted under license by [employer] (which disclaims liability or warranty for this information).  If you have questions about a medical condition or this instruction, always ask your healthcare professional. [address] any warranty or liability for your use of this information.

## 2019-05-05 ENCOUNTER — HOSPITAL ENCOUNTER (EMERGENCY)
Age: 29
Discharge: HOME | End: 2019-05-05
Attending: EMERGENCY MEDICINE
Payer: COMMERCIAL

## 2019-05-05 VITALS
DIASTOLIC BLOOD PRESSURE: 74 MMHG | BODY MASS INDEX: 36.8 KG/M2 | TEMPERATURE: 99.5 F | OXYGEN SATURATION: 97 % | HEART RATE: 122 BPM | RESPIRATION RATE: 18 BRPM | HEIGHT: 62 IN | WEIGHT: 200 LBS | SYSTOLIC BLOOD PRESSURE: 127 MMHG

## 2019-05-05 DIAGNOSIS — J02.0 ACUTE STREPTOCOCCAL PHARYNGITIS: Primary | ICD-10-CM

## 2019-05-05 LAB
DEPRECATED S PYO AG THROAT QL EIA: NEGATIVE
FLUAV AG NPH QL IA: NEGATIVE
FLUBV AG NOSE QL IA: NEGATIVE

## 2019-05-05 PROCEDURE — 74011250637 HC RX REV CODE- 250/637: Performed by: NURSE PRACTITIONER

## 2019-05-05 PROCEDURE — 87147 CULTURE TYPE IMMUNOLOGIC: CPT

## 2019-05-05 PROCEDURE — 87804 INFLUENZA ASSAY W/OPTIC: CPT

## 2019-05-05 PROCEDURE — 87070 CULTURE OTHR SPECIMN AEROBIC: CPT

## 2019-05-05 PROCEDURE — 87880 STREP A ASSAY W/OPTIC: CPT

## 2019-05-05 PROCEDURE — 99283 EMERGENCY DEPT VISIT LOW MDM: CPT

## 2019-05-05 RX ORDER — CLINDAMYCIN HYDROCHLORIDE 300 MG/1
300 CAPSULE ORAL 4 TIMES DAILY
Qty: 28 CAP | Refills: 0 | Status: SHIPPED | OUTPATIENT
Start: 2019-05-05 | End: 2019-05-09 | Stop reason: SINTOL

## 2019-05-05 RX ORDER — ONDANSETRON 4 MG/1
4 TABLET, ORALLY DISINTEGRATING ORAL
Qty: 10 TAB | Refills: 0 | Status: SHIPPED | OUTPATIENT
Start: 2019-05-05 | End: 2020-01-26

## 2019-05-05 RX ORDER — ONDANSETRON 4 MG/1
4 TABLET, ORALLY DISINTEGRATING ORAL
Status: COMPLETED | OUTPATIENT
Start: 2019-05-05 | End: 2019-05-05

## 2019-05-05 RX ORDER — IBUPROFEN 800 MG/1
800 TABLET ORAL
Status: COMPLETED | OUTPATIENT
Start: 2019-05-05 | End: 2019-05-05

## 2019-05-05 RX ADMIN — IBUPROFEN 800 MG: 800 TABLET ORAL at 13:53

## 2019-05-05 RX ADMIN — ONDANSETRON 4 MG: 4 TABLET, ORALLY DISINTEGRATING ORAL at 13:53

## 2019-05-05 NOTE — ED PROVIDER NOTES
Patient is a 56-year-old female who presents with a complaint of sore throat, fever, generalized achiness and malaise. She reports the symptoms occurred upon awakening this morning and have been progressive. Her daughter has a positive throat culture for strep and is currently being treated. Patient has not had a influenza vaccine this year. She presents with some mild nausea, no vomiting, has decreased p.o. intake since not feeling well this morning. She did not take any antipyretic medications, she denies pregnancy reports currently in menstrual cycle. She is a nonsmoker Pt reports mild nausea. Past Medical History:  
Diagnosis Date  Anxiety  Calculus of kidney  Depression  Dissociative disorder  Environmental allergies  GERD (gastroesophageal reflux disease)  Hashimoto's thyroiditis 10/24/2011  Hashimoto's thyroiditis 10/24/2011  Hypotension  Hypothyroid 2011  Hypothyroidism  MDD (major depressive disorder)  OCD (obsessive compulsive disorder)  Psychiatric disorder   
 depression --2 yr old daughter  2013  PTSD (post-traumatic stress disorder)  Unspecified adverse effect of anesthesia   
 per patient with epidural had severe N&V and passed out Past Surgical History:  
Procedure Laterality Date  DELIVERY   11  
 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  
Problem Relation Age of Onset  Heart Disease Mother   
     miltral value prolapse  Hypertension Father  Thyroid Disease Maternal Grandmother  Diabetes Paternal Grandmother  Diabetes Maternal Aunt  Cancer Paternal Grandfather   
     throat/stomach cancer,  at age 72  Breast Cancer Other   
     maternal great grandmother Social History Socioeconomic History  Marital status: LEGALLY  Spouse name: Not on file  Number of children: Not on file  Years of education: Not on file  Highest education level: Not on file Occupational History  Not on file Social Needs  Financial resource strain: Not on file  Food insecurity:  
  Worry: Not on file Inability: Not on file  Transportation needs:  
  Medical: Not on file Non-medical: Not on file Tobacco Use  Smoking status: Former Smoker Packs/day: 0.10 Years: 1.00 Pack years: 0.10 Last attempt to quit: 2018 Years since quittin.9  Smokeless tobacco: Never Used Substance and Sexual Activity  Alcohol use: No  
  Comment: social  
 Drug use: No  
 Sexual activity: Yes  
  Partners: Male Birth control/protection: Condom Lifestyle  Physical activity:  
  Days per week: Not on file Minutes per session: Not on file  Stress: Not on file Relationships  Social connections:  
  Talks on phone: Not on file Gets together: Not on file Attends Yarsani service: Not on file Active member of club or organization: Not on file Attends meetings of clubs or organizations: Not on file Relationship status: Not on file  Intimate partner violence:  
  Fear of current or ex partner: Not on file Emotionally abused: Not on file Physically abused: Not on file Forced sexual activity: Not on file Other Topics Concern  Not on file Social History Narrative  Not on file ALLERGIES: Ancef [cefazolin]; Pcn [penicillins]; Peanut; Chocolate [cocoa]; Citrus and derivatives; Tanda Mech; Beef containing products; Chicken derived; Corn; Egg; Milk; Milk prot-turm-pepper-pineappl; Shellfish derived; Soy; and [place] Review of Systems Constitutional: Positive for activity change, appetite change, diaphoresis, fatigue and fever. Negative for chills and unexpected weight change. HENT: Positive for sneezing and sore throat. Eyes: Negative. Respiratory: Negative. Cardiovascular: Negative. Gastrointestinal: Negative. Genitourinary: Negative. Musculoskeletal: Negative for neck pain and neck stiffness. Skin: Negative. Psychiatric/Behavioral: Negative. Vitals:  
 05/05/19 1329 BP: 127/74 Pulse: (!) 122 Resp: 18 Temp: 99.5 °F (37.5 °C) SpO2: 97% Weight: 90.7 kg (200 lb) Height: 5' 2\" (1.575 m) Physical Exam  
Constitutional: She is oriented to person, place, and time. She appears well-developed and well-nourished. Non-toxic appearance. She appears ill. HENT:  
Head: Normocephalic and atraumatic. Right Ear: Hearing, tympanic membrane and ear canal normal. No drainage, swelling or tenderness. No middle ear effusion. Left Ear: Hearing normal. No drainage, swelling or tenderness. No middle ear effusion. Mouth/Throat: Uvula is midline and mucous membranes are normal. Mucous membranes are not pale, not dry and not cyanotic. No oral lesions. No uvula swelling. Posterior oropharyngeal erythema present. No oropharyngeal exudate, posterior oropharyngeal edema or tonsillar abscesses. Tonsils are 0 on the right. Tonsils are 0 on the left. No tonsillar exudate. Eyes: Pupils are equal, round, and reactive to light. EOM are normal.  
Neck: Normal range of motion. Neck supple. No thyromegaly present. Cardiovascular: Normal rate, regular rhythm, normal heart sounds and intact distal pulses. Exam reveals no gallop and no friction rub. No murmur heard. Pulmonary/Chest: Effort normal and breath sounds normal. No stridor. No respiratory distress. She has no wheezes. She has no rhonchi. She has no rales. She exhibits no tenderness. Abdominal: Soft. Bowel sounds are normal. She exhibits no distension and no mass. There is no tenderness. There is no rebound and no guarding. No hernia. Lymphadenopathy:  
  She has no cervical adenopathy. Neurological: She is alert and oriented to person, place, and time. Skin: Skin is warm and dry. Capillary refill takes less than 2 seconds. No rash noted. She is not diaphoretic. No erythema. No pallor. Psychiatric: She has a normal mood and affect. Her behavior is normal.  
  
 
MDM Number of Diagnoses or Management Options Acute streptococcal pharyngitis:  
Diagnosis management comments: Repeat Temp 98.7 after motrin, HR 90 tolerating 2 bottles water without difficulty, Rapid strep negative, influenza negative. Based on visualization of posterior pharynx will treat for strep pharyngitis with clindamycin (PCN allergy) . Nausea resolved. Advised patient to keep fever controlled with tylenol alternating with motrin as directed on the OTC bottle. Increase hydration until urine appears Lt Yellow/clear, Follow up with PCP if no improvement. Procedures

## 2019-05-05 NOTE — ED TRIAGE NOTES
Patient ambulatory to ED treatment area with steady gait, accompanied by daughter, for complaint of \"I woke up today with a fever and my throat hurts. My daughter has had strep. \" denies taking anything for symptoms.

## 2019-05-07 LAB
BACTERIA SPEC CULT: ABNORMAL
SERVICE CMNT-IMP: ABNORMAL

## 2019-05-09 ENCOUNTER — TELEPHONE (OUTPATIENT)
Dept: FAMILY MEDICINE CLINIC | Age: 29
End: 2019-05-09

## 2019-05-09 ENCOUNTER — OFFICE VISIT (OUTPATIENT)
Dept: FAMILY MEDICINE CLINIC | Age: 29
End: 2019-05-09

## 2019-05-09 VITALS
HEART RATE: 78 BPM | WEIGHT: 203 LBS | DIASTOLIC BLOOD PRESSURE: 78 MMHG | HEIGHT: 62 IN | TEMPERATURE: 97.8 F | RESPIRATION RATE: 18 BRPM | BODY MASS INDEX: 37.36 KG/M2 | OXYGEN SATURATION: 98 % | SYSTOLIC BLOOD PRESSURE: 118 MMHG

## 2019-05-09 DIAGNOSIS — N93.9 ABNORMAL BLEEDING IN MENSTRUAL CYCLE: ICD-10-CM

## 2019-05-09 DIAGNOSIS — K20.90 ACUTE ESOPHAGITIS: ICD-10-CM

## 2019-05-09 DIAGNOSIS — J02.0 STREPTOCOCCAL PHARYNGITIS: Primary | ICD-10-CM

## 2019-05-09 RX ORDER — AZITHROMYCIN 250 MG/1
TABLET, FILM COATED ORAL
Qty: 6 TAB | Refills: 0 | Status: SHIPPED | OUTPATIENT
Start: 2019-05-09 | End: 2019-05-14

## 2019-05-09 NOTE — TELEPHONE ENCOUNTER
Pt feels she might be having a reaction to the antibioic given at the er from where she went the other day.      She states the antibiotic is clindamycin (CLEOCIN) 300 mg capsule     Best call back number is [phone]

## 2019-05-09 NOTE — PROGRESS NOTES
Subjective:      Jeff Kraft is a 34 y.o. female here with complaint of \"blisters in my mouth and throat\". She is having pain with swallowing. Onset of symptoms was ~4 days ago. Currently on clindamycin for strep pharyngitis which she started on 5/5/19. She states that she has had similar symptoms after having clindamycin previously. She is having retrosternal chest pain. Denies fever, chills. She has had her menstrual period for the last month. Patient's last menstrual period was 04/10/2019 (approximate). Menstrual flow has been heavy. She report that her menses did skip the month of March. Gyn: Dr. Michelle Leach. She has been compliant with levothyroxine therapy. Current Outpatient Medications   Medication Sig Dispense Refill    ondansetron (ZOFRAN ODT) 4 mg disintegrating tablet Take 1 Tab by mouth every eight (8) hours as needed for Nausea. 10 Tab 0    clindamycin (CLEOCIN) 300 mg capsule Take 1 Cap by mouth four (4) times daily for 7 days. 28 Cap 0    aspirin (ASPIRIN) 325 mg tablet Take 325 mg by mouth daily.  levothyroxine (SYNTHROID) 137 mcg tablet Take 137.5 mcg by mouth Daily (before breakfast). 30 Tab 5    citalopram (CELEXA) 40 mg tablet Take 1 Tab by mouth once over twenty-four (24) hours for 180 days.  90 Tab 1       Allergies   Allergen Reactions    Ancef [Cefazolin] Hives     Given at c/s, swelling of face/hives, tx'd with benadryl    Pcn [Penicillins] Hives     Facial edema     Peanut Hives    Chocolate [Cocoa] Hives    Citrus And Derivatives Nausea and Vomiting     Oranges only    [place] Hives    Beef Containing Products Hives    Chicken Derived Hives    Corn Hives    Egg Hives    Milk Hives    Milk Prot-Turm-Pepper-Pineappl Hives    Shellfish Derived Hives    Soy Hives    Strawberry Hives       Past Medical History:   Diagnosis Date    Anxiety     Calculus of kidney     Depression     Dissociative disorder     Environmental allergies     GERD (gastroesophageal reflux disease)     Hashimoto's thyroiditis 10/24/2011    Hashimoto's thyroiditis 10/24/2011    Hypotension     Hypothyroid 2011    Hypothyroidism     MDD (major depressive disorder)     OCD (obsessive compulsive disorder)     Psychiatric disorder     depression --2 yr old daughter  2013    PTSD (post-traumatic stress disorder)     Unspecified adverse effect of anesthesia     per patient with epidural had severe N&V and passed out       Social History     Tobacco Use    Smoking status: Former Smoker     Packs/day: 0.10     Years: 1.00     Pack years: 0.10     Last attempt to quit: 2018     Years since quittin.9    Smokeless tobacco: Never Used   Substance Use Topics    Alcohol use: No     Comment: social        Review of Systems  Pertinent items are noted in HPI. Objective:     Visit Vitals  /78 (BP 1 Location: Right arm, BP Patient Position: Sitting) Comment: Manual   Pulse 78   Temp 97.8 °F (36.6 °C) (Oral) Comment: .   Resp 18   Ht 5' 2\" (1.575 m)   Wt 203 lb (92.1 kg)   LMP 04/10/2019 (Approximate)   SpO2 98%   BMI 37.13 kg/m²      General appearance - alert, well appearing, and in no distress  Oropharynx - erythematous, ulcerations noted on bilateral tonsils, mucous membrane moist   Neck - supple, no significant adenopathy  Chest - clear to auscultation, no wheezes, rales or rhonchi, symmetric air entry, no tachypnea, retractions or cyanosis  Heart - normal rate, regular rhythm, normal S1, S2, no murmurs, rubs, clicks or gallops    Assessment/Plan:   John Ramirez is a 34 y.o. female seen for:     1. Streptococcal pharyngitis: discontinue clindamycin and start azithromycin as below. She has tolerated this medication in the past.   - azithromycin (ZITHROMAX) 250 mg tablet; Take 2 tablets today, then take 1 tablet daily  Dispense: 6 Tab; Refill: 0    2. Acute esophagitis: likely due to clindamycin use.  If no improvement noted within the next few days, she will need GI evaluation. 3. Abnormal bleeding in menstrual cycle: will check labs as below. If TSH abnormal, will need to adjust levothyroxine dose. - CBC W/O DIFF  - TSH AND FREE T4      I have discussed the diagnosis with the patient and the intended plan as seen in the above orders. The patient has received an after-visit summary and questions were answered concerning future plans. I have discussed medication side effects and warnings with the patient as well. Patient verbalizes understanding of plan of care and denies further questions or concerns at this time. Informed patient to return to the office if symptoms worsen or if new symptoms arise. Follow-up and Dispositions    · Return if symptoms worsen or fail to improve.

## 2019-05-09 NOTE — PATIENT INSTRUCTIONS
Please call and schedule appointment with your Gynecologist for further evaluation of abnormal menstrual bleeding. If your symptoms do not improve within 7 days, please let me know. Esophagitis: Care Instructions  Your Care Instructions    Esophagitis (say \"ih-sof-uh-JY-tus\") is irritation of the esophagus, the tube that carries food from your throat to your stomach. Acid reflux is the most common cause of this condition. When you have reflux, stomach acid and juices flow upward. This can cause pain or a burning feeling in your chest. You may have a sore throat. It may be hard to swallow. Other causes of this condition include some medicines and supplements. Allergies or an infection can also cause it. Your doctor will ask about your symptoms and past health. He or she might do tests to find the cause of your symptoms. Treatment depends on what is causing the problem. Treatment might include changing your diet or taking medicine to relieve your symptoms. It might also include changing a medicine that is causing your symptoms. If you have reflux, medicine that reduces the stomach acid helps your body heal. It might take 1 to 3 weeks to heal.  Follow-up care is a key part of your treatment and safety. Be sure to make and go to all appointments, and call your doctor if you are having problems. It's also a good idea to know your test results and keep a list of the medicines you take. How can you care for yourself at home? · If you have acid reflux, your doctor may recommend that you:  ? Eat several small meals instead of two or three large meals. After you eat, wait 2 to 3 hours before you lie down. ? Avoid chocolate, mint, alcohol, and spicy foods. ? Don't smoke or use smokeless tobacco. Smoking can make this condition worse. If you need help quitting, talk to your doctor about stop-smoking programs and medicines. These can increase your chances of quitting for good.   ? Raise the head of your bed 6 to 8 inches if you have symptoms at night. ? Lose weight if you are overweight. ? Take an over-the-counter antacid, such as Maalox, Mylanta, or Tums. Be careful when you take over-the-counter antacid medicines. Many of these medicines have aspirin in them. Read the label to make sure that you are not taking more than the recommended dose. Too much aspirin can be harmful. ? Take stronger acid reducers. Examples are famotidine (such as Pepcid), omeprazole (such as Prilosec), and ranitidine (such as Zantac). · If your condition is caused by infection, allergy, or other problems, use the medicine or treatments that your doctor recommends. · Be safe with medicines. Take your medicines exactly as prescribed. Call your doctor if you think you are having a problem with your medicine. When should you call for help? Call your doctor now or seek immediate medical care if:    · You have new or worse belly pain.     · You are vomiting.    Watch closely for changes in your health, and be sure to contact your doctor if:    · You have new or worse symptoms of reflux.     · You have trouble or pain swallowing.     · You are losing weight.     · You do not get better as expected. Where can you learn more? Go to http://mamie-sandrita.info/. Enter J093 in the search box to learn more about \"Esophagitis: Care Instructions. \"  Current as of: March 27, 2018  Content Version: 11.9  © 5673-1989 [employer]. Care instructions adapted under license by [employer] (which disclaims liability or warranty for this information). If you have questions about a medical condition or this instruction, always ask your healthcare professional. [place] [address] any warranty or liability for your use of this information.

## 2019-05-09 NOTE — PROGRESS NOTES
Identified pt with two pt identifiers(name and ). Chief Complaint   Patient presents with    Medication Reaction     Patient thinks she may be having esophigitis from antibiotics    Irregular Menses     Has bleed for a month.         Health Maintenance Due   Topic    HPV Age 9Y-34Y (1 - Female 3-dose series)    PAP AKA CERVICAL CYTOLOGY        Wt Readings from Last 3 Encounters:   19 203 lb (92.1 kg)   19 200 lb (90.7 kg)   19 205 lb 11 oz (93.3 kg)     Temp Readings from Last 3 Encounters:   19 97.8 °F (36.6 °C) (Oral)   19 99.5 °F (37.5 °C)   19 98 °F (36.7 °C)     BP Readings from Last 3 Encounters:   19 118/78   19 127/74   19 109/70     Pulse Readings from Last 3 Encounters:   19 78   19 (!) 122   19 97         Learning Assessment:  :     Learning Assessment 2015 2014 3/21/2014   PRIMARY LEARNER Patient Patient Patient   HIGHEST LEVEL OF EDUCATION - PRIMARY LEARNER  GRADUATED HIGH SCHOOL OR GED GRADUATED HIGH SCHOOL OR GED GRADUATED HIGH SCHOOL OR GED   BARRIERS PRIMARY LEARNER NONE NONE NONE   CO-LEARNER CAREGIVER No No -   PRIMARY LANGUAGE ENGLISH ENGLISH ENGLISH   LEARNER PREFERENCE PRIMARY READING DEMONSTRATION DEMONSTRATION   ANSWERED BY Patient patient patient   RELATIONSHIP SELF SELF SELF       Depression Screening:  :     3 most recent PHQ Screens 3/6/2018   Little interest or pleasure in doing things Not at all   Feeling down, depressed, irritable, or hopeless Not at all   Total Score PHQ 2 0   Trouble falling or staying asleep, or sleeping too much -   Feeling tired or having little energy -   Poor appetite, weight loss, or overeating -   Feeling bad about yourself - or that you are a failure or have let yourself or your family down -   Trouble concentrating on things such as school, work, reading, or watching TV -   Moving or speaking so slowly that other people could have noticed; or the opposite being so fidgety that others notice -   Thoughts of being better off dead, or hurting yourself in some way -   PHQ 9 Score -   How difficult have these problems made it for you to do your work, take care of your home and get along with others -       Fall Risk Assessment:  :     No flowsheet data found. Abuse Screening:  :     Abuse Screening Questionnaire 6/29/2018 10/27/2016 5/22/2014   Do you ever feel afraid of your partner? N N N   Are you in a relationship with someone who physically or mentally threatens you? N N N   Is it safe for you to go home? Y Y Y       Coordination of Care Questionnaire:  :     1) Have you been to an emergency room, urgent care clinic since your last visit? no   Hospitalized since your last visit? no             2) Have you seen or consulted any other health care providers outside of [address] [place] [address] since your last visit? no  (Include any pap smears or colon screenings in this section.)    3) Do you have an Advance Directive on file? no  Are you interested in receiving information about Advance Directives? no    Reviewed record in preparation for visit and have obtained necessary documentation. Medication reconciliation up to date and corrected with patient at this time.

## 2019-05-09 NOTE — TELEPHONE ENCOUNTER
Patient thinks she may be having esophigitis from antibiotics. Patient was given ER precautions and asked to make an appointment. She verbalized understanding.

## 2019-05-10 ENCOUNTER — TELEPHONE (OUTPATIENT)
Dept: FAMILY MEDICINE CLINIC | Age: 29
End: 2019-05-10

## 2019-05-10 DIAGNOSIS — E03.9 ACQUIRED HYPOTHYROIDISM: Primary | ICD-10-CM

## 2019-05-10 LAB
ERYTHROCYTE [DISTWIDTH] IN BLOOD BY AUTOMATED COUNT: 16.3 % (ref 12.3–15.4)
HCT VFR BLD AUTO: 33.6 % (ref 34–46.6)
HGB BLD-MCNC: 10.3 G/DL (ref 11.1–15.9)
MCH RBC QN AUTO: 25.2 PG (ref 26.6–33)
MCHC RBC AUTO-ENTMCNC: 30.7 G/DL (ref 31.5–35.7)
MCV RBC AUTO: 82 FL (ref 79–97)
PLATELET # BLD AUTO: 364 X10E3/UL (ref 150–379)
RBC # BLD AUTO: 4.08 X10E6/UL (ref 3.77–5.28)
T4 FREE SERPL-MCNC: 0.86 NG/DL (ref 0.82–1.77)
TSH SERPL DL<=0.005 MIU/L-ACNC: 14.37 UIU/ML (ref 0.45–4.5)
WBC # BLD AUTO: 5.8 X10E3/UL (ref 3.4–10.8)

## 2019-05-10 RX ORDER — LEVOTHYROXINE SODIUM 150 UG/1
150 TABLET ORAL
Qty: 90 TAB | Refills: 1 | Status: SHIPPED | OUTPATIENT
Start: 2019-05-10 | End: 2019-09-11

## 2019-05-10 NOTE — PROGRESS NOTES
Labs reviewed. Anemia noted on CBC. TSH is elevated. Patient called to discuss. Reports compliance with levothyroxine and takes on an empty stomach. Will increase levothyroxine dose and have her return in 6 weeks for labs.

## 2019-05-10 NOTE — TELEPHONE ENCOUNTER
Labs reviewed. Anemia noted on CBC. TSH is elevated. Patient called to discuss. Reports compliance with levothyroxine and takes on an empty stomach. Will increase levothyroxine dose and have her return in 6 weeks for labs (after 7/21/19). Patient verbalizes understanding. Orders Placed This Encounter    TSH AND FREE T4    levothyroxine (SYNTHROID) 150 mcg tablet     Sig: Take 1 Tab by mouth Daily (before breakfast). Dispense:  90 Tab     Refill:  1     Dose adjustment.

## 2019-05-24 RX ORDER — CITALOPRAM 40 MG/1
40 TABLET, FILM COATED ORAL DAILY
Qty: 90 TAB | Refills: 1 | Status: SHIPPED | OUTPATIENT
Start: 2019-05-24 | End: 2020-01-14 | Stop reason: SDUPTHER

## 2019-06-04 ENCOUNTER — OFFICE VISIT (OUTPATIENT)
Dept: OBGYN CLINIC | Age: 29
End: 2019-06-04

## 2019-06-04 VITALS
HEIGHT: 62 IN | WEIGHT: 208 LBS | DIASTOLIC BLOOD PRESSURE: 72 MMHG | SYSTOLIC BLOOD PRESSURE: 114 MMHG | BODY MASS INDEX: 38.28 KG/M2

## 2019-06-04 DIAGNOSIS — N93.9 ABNORMAL UTERINE BLEEDING: Primary | ICD-10-CM

## 2019-06-04 DIAGNOSIS — I26.99 OTHER PULMONARY EMBOLISM WITHOUT ACUTE COR PULMONALE, UNSPECIFIED CHRONICITY (HCC): ICD-10-CM

## 2019-06-04 DIAGNOSIS — R10.32 LLQ PAIN: ICD-10-CM

## 2019-06-04 LAB
HCG URINE, QL. (POC): NEGATIVE
VALID INTERNAL CONTROL?: YES

## 2019-06-04 RX ORDER — LEVOTHYROXINE SODIUM 137 UG/1
TABLET ORAL
Refills: 5 | COMMUNITY
Start: 2019-04-22 | End: 2019-09-11

## 2019-06-04 RX ORDER — GUAIFENESIN 100 MG/5ML
81 LIQUID (ML) ORAL DAILY
COMMUNITY
End: 2020-01-26

## 2019-06-04 RX ORDER — MEDROXYPROGESTERONE ACETATE 10 MG/1
10 TABLET ORAL DAILY
Qty: 10 TAB | Refills: 0 | Status: SHIPPED | OUTPATIENT
Start: 2019-06-04 | End: 2019-06-14

## 2019-06-04 NOTE — PATIENT INSTRUCTIONS
Abnormal Uterine Bleeding: Care Instructions  Your Care Instructions    Abnormal uterine bleeding (AUB) is irregular bleeding from the uterus that is longer or heavier than usual or does not occur at your regular time. Sometimes it is caused by changes in hormone levels. It can also be caused by growths in the uterus, such as fibroids or polyps. Sometimes a cause cannot be found. You may have heavy bleeding when you are not expecting your period. Your doctor may suggest a pregnancy test, if you think you are pregnant. Follow-up care is a key part of your treatment and safety. Be sure to make and go to all appointments, and call your doctor if you are having problems. It's also a good idea to know your test results and keep a list of the medicines you take. How can you care for yourself at home? · Be safe with medicines. Take pain medicines exactly as directed. ? If the doctor gave you a prescription medicine for pain, take it as prescribed. ? If you are not taking a prescription pain medicine, ask your doctor if you can take an over-the-counter medicine. · You may be low in iron because of blood loss. Eat a balanced diet that is high in iron and vitamin C. Foods rich in iron include red meat, shellfish, eggs, beans, and leafy green vegetables. Talk to your doctor about whether you need to take iron pills or a multivitamin. When should you call for help? Call 911 anytime you think you may need emergency care. For example, call if:    · You passed out (lost consciousness).    Call your doctor now or seek immediate medical care if:    · You have new or worse belly or pelvic pain.     · You have severe vaginal bleeding.     · You feel dizzy or lightheaded, or you feel like you may faint.    Watch closely for changes in your health, and be sure to contact your doctor if:    · You think you may be pregnant.     · Your bleeding gets worse.     · You do not get better as expected.    Where can you learn more?  Go to http://mamie-sandrita.info/. Enter M167 in the search box to learn more about \"Abnormal Uterine Bleeding: Care Instructions. \"  Current as of: May 14, 2018  Content Version: 11.9  © 2622-3404 [employer]. Care instructions adapted under license by [facility] [facility] (which disclaims liability or warranty for this information). If you have questions about a medical condition or this instruction, always ask your healthcare professional. [address] any warranty or liability for your use of this information.

## 2019-06-04 NOTE — PROGRESS NOTES
[facility]  [email or web address]  [phone]    Melonie Patel MD, [address] [facility]       OB/GYN Problem visit    Chief Complaint:   Chief Complaint   Patient presents with    Menstrual Problem     irregular since [place]       History of Present Illness: This is a new problem being evaluated by this provider. The patient is a 34 y.o.  female who reports having irregular cycles for 6 months. Most recent Cycle lasted from 4/10/19 - 19 and was heavy entire time. She reports the symptoms are is moderately worse. Aggravating factors include none. Alleviating factors include none. Pt c/o RLQ pain on and off but getting worse, worried about cysts. +FH PCOS    Menses were regular. No recent SA. She reports abnl thyroid labs a few weeks ago, med changes, no fu scheduled yet.  +fatigue. Also anemia on labs per pt. She does not have other concerns. LMP: Patient's last menstrual period was 04/10/2019 (exact date).     PFSH:  Past Medical History:   Diagnosis Date    Anxiety     Calculus of kidney     Depression     Dissociative disorder     Environmental allergies     GERD (gastroesophageal reflux disease)     Hashimoto's thyroiditis 10/24/2011    Hashimoto's thyroiditis 10/24/2011    Hypotension     Hypothyroid 2011    Hypothyroidism     MDD (major depressive disorder)     OCD (obsessive compulsive disorder)     Psychiatric disorder     depression --2 yr old daughter  2013    PTSD (post-traumatic stress disorder)     Unspecified adverse effect of anesthesia     per patient with epidural had severe N&V and passed out     Past Surgical History:   Procedure Laterality Date    DELIVERY   11    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, congenital anomaly    HX  SECTION  2016     Family History   Problem Relation Age of Onset    Heart Disease Mother         miltral value prolapse    Hypertension Father     Thyroid Disease Maternal Grandmother     Diabetes Paternal Grandmother     Diabetes Maternal Aunt     Cancer Paternal Grandfather         throat/stomach cancer,  at age 72    Breast Cancer Other         maternal great grandmother     Social History     Tobacco Use    Smoking status: Former Smoker     Packs/day: 0.10     Years: 1.00     Pack years: 0.10     Last attempt to quit: 2018     Years since quittin.0    Smokeless tobacco: Never Used   Substance Use Topics    Alcohol use: No     Comment: social    Drug use: No     Allergies   Allergen Reactions    Ancef [Cefazolin] Hives     Given at c/s, swelling of face/hives, tx'd with benadryl    Pcn [Penicillins] Hives     Facial edema     Peanut Hives    Chocolate [Cocoa] Hives    Citrus And Derivatives Nausea and Vomiting     Oranges only    Clindamycin Other (comments)     Severe ulcerative esophagitis    [place] Hives    Beef Containing Products Hives    Chicken Derived Hives    Corn Hives    Egg Hives    Milk Hives    Milk Prot-Turm-Pepper-Pineappl Hives    Shellfish Derived Hives    Soy Hives    Strawberry Hives     Current Outpatient Medications   Medication Sig    aspirin 81 mg chewable tablet Take 81 mg by mouth daily.  medroxyPROGESTERone (PROVERA) 10 mg tablet Take 1 Tab by mouth daily for 10 days. Take pregnancy test prior to taking provera as needed.  citalopram (CELEXA) 40 mg tablet Take 1 Tab by mouth daily.  levothyroxine (SYNTHROID) 150 mcg tablet Take 1 Tab by mouth Daily (before breakfast).  ondansetron (ZOFRAN ODT) 4 mg disintegrating tablet Take 1 Tab by mouth every eight (8) hours as needed for Nausea.  levothyroxine (SYNTHROID) 137 mcg tablet     aspirin (ASPIRIN) 325 mg tablet Take 325 mg by mouth daily. No current facility-administered medications for this visit.         Review of Systems:  History obtained from the patient  Constitutional:+ fatigue  ENT ROS: negative for - hearing change, oral lesions or visual changes  Respiratory: negative for cough, wheezing or dyspnea on exertion  Cardiovascular: negative for chest pain, irregular heart beats, exertional chest pressure/discomfort  Gastrointestinal: negative for dysphagia, nausea and vomiting  Genito-Urinary ROS:  see HPI  Inteument/breast: negative for rash, breast lump and nipple discharge  Musculoskeletal:negative for stiff joints, neck pain and muscle weakness  Endocrine ROS: negative for - breast changes, galactorrhea or temperature intolerance  Hematological and Lymphatic ROS: negative for - blood clots, bruising or swollen lymph nodes    Physical Exam:  Visit Vitals  /72   Ht 5' 2\" (1.575 m)   Wt 208 lb (94.3 kg)   BMI 38.04 kg/m²       GENERAL: alert, well appearing, and in no distress  HEAD: normocephalic, atraumatic. PULM: clear to auscultation, no wheezes, rales or rhonchi, symmetric air entry   COR: normal rate and regular rhythm, S1 and S2 normal   ABDOMEN: soft, nontender, nondistended, no masses or organomegaly   EGBUS: no lesions, no inflammation, no masses  VULVA: normal appearing vulva with no masses, tenderness or lesions  VAGINA: normal appearing vagina with normal color, no lesions, bloody discharge  CERVIX: normal appearing cervix without discharge or lesions, non tender  UTERUS: uterus is normal size, shape, consistency and nontender   ADNEXA: normal adnexa in size, nontender and no masses  NEURO: alert, oriented, normal speech    Assessment:  Encounter Diagnoses   Name Primary?  Abnormal uterine bleeding Yes    LLQ pain     Other pulmonary embolism without acute cor pulmonale, unspecified chronicity (HCC)        Plan:  The patient is advised that she should contact the office if she does not note improvement or if symptoms recur  Recommend follow up with PCP for non-gynecologic complaints and chronic medical problems. She should contact our office with any questions or concerns  She could keep her routine annual exam appointment. We discussed potential causes of symptomatic bleeding: including but not limited to hormonal, medical, infection/inflammation and structural etiologies. Suspect related to thyroid imbalance  Cyclic provera : h/o given, hold for now, can take if no menses x 3 mos  Keep pcp/endo fu: if AUB persists with normal thyroid: notify MD  Bleeding precautions  We discussed potential causes of lower abdominal/pelvic pain: GYN, GI, , musculoskeletal, infectious process, adhesions, or other etiology  We discussed evaluation of lower abdominal/pelvic pain: including but not limited to observation, surgical evaluation/laparoscopy, imaging   We discussed treatment of lower abdominal/pelvic pain: including but not limited to: pain medication, hormonal management, surgical intervention, bowel regimen. Since pain can be a symptoms of an underlying abnormal process she is encouraged to contact my office with persistent symptoms for additional evaluation and treatment if needed. FU and US with WWE if due  Disc PE and risks of blood clot, rec avoid estrogen products. Reviewed rba of progesterone based options with h/o PE and VTE  Bowel regimen prn    Orders Placed This Encounter    CHLAMYDIA/GC PCR    medroxyPROGESTERone (PROVERA) 10 mg tablet       No results found for this visit on 06/04/19.

## 2019-06-07 LAB
C TRACH RRNA SPEC QL NAA+PROBE: NEGATIVE
N GONORRHOEA RRNA SPEC QL NAA+PROBE: NEGATIVE

## 2019-06-27 LAB
APTT HEX PL PPP: 4 SEC
APTT IMM NP PPP: NORMAL SEC
APTT PPP 1:1 SALINE: NORMAL SEC
APTT PPP: 26.5 SEC
B2 GLYCOPROT1 IGA SER-ACNC: <10 SAU
B2 GLYCOPROT1 IGG SER-ACNC: <10 SGU
B2 GLYCOPROT1 IGM SER-ACNC: <10 SMU
CARDIOLIPIN IGG SER IA-ACNC: <10 GPL
CARDIOLIPIN IGM SER IA-ACNC: 16 MPL
CONFIRM DRVVT: NORMAL SEC
DRVVT SCREEN TO CONFIRM RATIO: NORMAL RATIO
INR PPP: 0.9 RATIO
LAC INTERPRETATION, 502038: NORMAL
PLATELET NEUTRALIZATION 500049: 0.7 SEC
PROT S ACT/NOR PPP: 93 % (ref 63–140)
PROT S AG ACT/NOR PPP IA: 87 % (ref 60–150)
PROT S FREE AG ACT/NOR PPP IA: 117 % (ref 57–157)
PROTHROMBIN TIME: 9.8 SEC
SCREEN DRVVT: 29.5 SEC
THROMBIN TIME: 17.8 SEC

## 2019-07-02 ENCOUNTER — OFFICE VISIT (OUTPATIENT)
Dept: ONCOLOGY | Age: 29
End: 2019-07-02

## 2019-07-02 VITALS
SYSTOLIC BLOOD PRESSURE: 108 MMHG | DIASTOLIC BLOOD PRESSURE: 69 MMHG | OXYGEN SATURATION: 98 % | RESPIRATION RATE: 18 BRPM | HEIGHT: 62 IN | HEART RATE: 85 BPM | TEMPERATURE: 98.4 F | BODY MASS INDEX: 37.73 KG/M2 | WEIGHT: 205 LBS

## 2019-07-02 DIAGNOSIS — I26.99 OTHER PULMONARY EMBOLISM WITHOUT ACUTE COR PULMONALE, UNSPECIFIED CHRONICITY (HCC): Primary | ICD-10-CM

## 2019-07-02 NOTE — PROGRESS NOTES
[facility] [place] [address] [facility]  [place] : [phone]  F: [phone]      Reason for Visit:   Lauren Palumbo is a 34 y.o. female who is seen for follow up of PE. History of Present Illness:   She stopped her coumadin when I saw her last and has been taking ASA daily. She went to the ED after I saw her due to leg pain, but dopplers were negative. Tolerating ASA, no bleeding. No recent symptoms concerning for DVT. Some chronic dyspnea, stable. PAST HISTORY: The following sections were reviewed and updated in the EMR as appropriate: PMH, SH, FH, Medications, Allergies. Allergies   Allergen Reactions    Ancef [Cefazolin] Hives     Given at c/s, swelling of face/hives, tx'd with benadryl    Pcn [Penicillins] Hives     Facial edema     Peanut Hives    Chocolate [Cocoa] Hives    Citrus And Derivatives Nausea and Vomiting     Oranges only    Clindamycin Other (comments)     Severe ulcerative esophagitis    [place] Hives    Beef Containing Products Hives    Chicken Derived Hives    Corn Hives    Egg Hives    Milk Hives    Milk Prot-Turm-Pepper-Pineappl Hives    Shellfish Derived Hives    Soy Hives    Strawberry Hives      Review of Systems: A complete review of systems was obtained, reviewed, and scanned into the EMR. Pertinent findings reviewed above.       Physical Exam:     Visit Vitals  /69 (BP 1 Location: Left arm, BP Patient Position: Sitting)   Pulse 85   Temp 98.4 °F (36.9 °C) (Oral)   Resp 18   Ht 5' 2\" (1.575 m)   Wt 205 lb (93 kg)   LMP 06/04/2019   SpO2 98%   BMI 37.49 kg/m²     General: No distress  Respiratory: Normal respiratory effort  CV: No peripheral edema  Skin: No rashes, ecchymoses, or petechiae  Psych: Alert, oriented, normal mood/affect      Results:     Lab Results   Component Value Date/Time    WBC 5.8 05/09/2019 02:21 PM    HGB 10.3 (L) 05/09/2019 02:21 PM    HCT 33.6 (L) 05/09/2019 02:21 PM    PLATELET 173 09/75/1137 02:21 PM    MCV 82 05/09/2019 02:21 PM    ABS. NEUTROPHILS 10.7 (H) 03/20/2019 02:01 PM    Hemoglobin (POC) 13.3 03/23/2014 05:12 AM    Hematocrit (POC) 33 (L) 07/03/2018 06:54 PM     Lab Results   Component Value Date/Time    Sodium 138 03/20/2019 02:01 PM    Potassium 4.2 03/20/2019 02:01 PM    Chloride 102 03/20/2019 02:01 PM    CO2 27 03/20/2019 02:01 PM    Glucose 91 03/20/2019 02:01 PM    BUN 15 03/20/2019 02:01 PM    Creatinine 0.70 03/20/2019 02:01 PM    GFR est AA >60 03/20/2019 02:01 PM    GFR est non-AA >60 03/20/2019 02:01 PM    Calcium 8.8 03/20/2019 02:01 PM    Sodium (POC) 139 07/03/2018 06:54 PM    Potassium (POC) 3.6 07/03/2018 06:54 PM    Chloride (POC) 103 07/03/2018 06:54 PM    Glucose (POC) 100 07/03/2018 06:54 PM    BUN (POC) 7 (L) 07/03/2018 06:54 PM    Creatinine (POC) 0.8 07/03/2018 06:54 PM    Calcium, ionized (POC) 1.19 07/03/2018 06:54 PM     Lab Results   Component Value Date/Time    Bilirubin, total 0.3 03/20/2019 02:01 PM    ALT (SGPT) 27 03/20/2019 02:01 PM    AST (SGOT) 18 03/20/2019 02:01 PM    Alk.  phosphatase 74 03/20/2019 02:01 PM    Protein, total 7.9 03/20/2019 02:01 PM    Albumin 3.9 03/20/2019 02:01 PM    Globulin 4.0 03/20/2019 02:01 PM     Lab Results   Component Value Date/Time    Sed rate, automated 36 (H) 07/04/2018 01:14 AM    TSH 14.370 (H) 05/09/2019 02:21 PM    Lipase 68 (L) 02/01/2019 03:06 AM    HIV 1/O/2 Abs <1.00 05/06/2015 03:23 PM     Lab Results   Component Value Date/Time    INR 0.9 06/20/2019 02:44 PM    INR 1.0 12/03/2018 07:24 PM    aPTT 24.7 12/03/2018 07:24 PM    D-dimer 0.67 (H) 02/17/2019 12:37 PM    Fibrinogen 386 07/04/2018 01:14 AM    Antithrombin Activity 93 07/04/2018 01:14 AM    Protein S, Total 87 06/20/2019 02:44 PM    Protein S, Free 117 06/20/2019 02:44 PM    Protein S-Functional 93 06/20/2019 02:44 PM    Protein C-Functional 131 07/04/2018 01:14 AM    Anticardiolipin Ab, IgG <10 06/20/2019 02:44 PM Anticardiolipin Ab, IgM 16 06/20/2019 02:44 PM    Anticardiolipin Ab, IgA <10 11/06/2018 12:24 PM    Beta-2 Glycoprotein I, IgG <10 06/20/2019 02:44 PM    Beta-2 Glycoprotein I, IgM <10 06/20/2019 02:44 PM    Beta-2 Glycoprotein I, IgA <10 06/20/2019 02:44 PM       7/4/2018  Prothrombin mutation: negative  Factor V Leiden mutation: negative    11/6/2018  APLA panel: indeterminate for lupus anticoagulant, otherwise negative    6/20/2019:  APLA panel: negative, including lupus anticoagulant    CTA Chest 5/21/2014: negative for PE    CTA Chest 7/3/2018: Pulmonary embolus in RUL, RLL, JOSÉ, LLL, and right main pulmonary artery. Bilateral LE Doppler 9/27/2018: negative      Assessment:   1) Pulmonary embolism  Diagnosed 7/3/2018. Risk factors at that time include obesity and recent OCP use. Treated with coumadin through 11/20/2019. Repeat doppler negative for residual thrombus. This is likely a provoked event, based on her OCP use, so I recommended stopping anticoagulation in November. She is now on ASA daily to help provide some risk reduction, though we discussed that we don't have strong evidence for this and she could consider stopping this. Initial lupus anticoagulant panel was initially indeterminate. Repeat recently off anticoagulation was negative. This does not appear to be due to APLA. 2) Low Protein S  Initially low in setting of acute event. Now normal off therapy. Does not have protein S deficiency. 3) Contraception  I recommend barrier contraception of IUD. No estrogen, given VTE history. If she gets pregnant, she should follow up with me to discuss prophylactic anticoagulation.     Plan:     · Follow up with me PRN      Signed By: Genesis Finney MD

## 2019-07-02 NOTE — PROGRESS NOTES
1. Have you been to the ER, urgent care clinic since your last visit? Hospitalized since your last visit? No    2. Have you seen or consulted any other health care providers outside of the [address] [place] [address] since your last visit? Include any pap smears or colon screening.  No

## 2019-07-30 ENCOUNTER — OFFICE VISIT (OUTPATIENT)
Dept: OBGYN CLINIC | Age: 29
End: 2019-07-30

## 2019-07-30 VITALS
BODY MASS INDEX: 38.13 KG/M2 | SYSTOLIC BLOOD PRESSURE: 122 MMHG | WEIGHT: 207.2 LBS | HEIGHT: 62 IN | DIASTOLIC BLOOD PRESSURE: 64 MMHG

## 2019-07-30 DIAGNOSIS — N64.4 BREAST PAIN, LEFT: ICD-10-CM

## 2019-07-30 DIAGNOSIS — I26.99 OTHER PULMONARY EMBOLISM WITHOUT ACUTE COR PULMONALE, UNSPECIFIED CHRONICITY (HCC): Chronic | ICD-10-CM

## 2019-07-30 DIAGNOSIS — N92.6 MISSED MENSES: Primary | ICD-10-CM

## 2019-07-30 LAB
HCG URINE, QL. (POC): NEGATIVE
VALID INTERNAL CONTROL?: YES

## 2019-07-30 RX ORDER — QUINIDINE GLUCONATE 324 MG
65 TABLET, EXTENDED RELEASE ORAL ONCE
COMMUNITY

## 2019-07-30 NOTE — PROGRESS NOTES
[facility]  [email or web address]    Monica Freitas MD, FACOG       OB/GYN Problem visit    Chief Complaint:   Chief Complaint   Patient presents with    Breast Mass    Contraception       History of Present Illness: This is a new problem being evaluated by this provider. The patient is a 34 y.o.  female who reports having breast mass and breast pain for 1 month. She reports the symptoms are has improved. Aggravating factors include none. Alleviating factors include none. She reports family history maternal great grandma. She is not breast feeding. She does have other concerns. Would like to discuss contraceptives, no menses for 2019. Last Mammogram Results:  No results found for this or any previous visit. LMP: Patient's last menstrual period was 2018.     PFSH:  Past Medical History:   Diagnosis Date    Anxiety     Calculus of kidney     Depression     Dissociative disorder     Environmental allergies     GERD (gastroesophageal reflux disease)     Hashimoto's thyroiditis 10/24/2011    Hashimoto's thyroiditis 10/24/2011    Hypotension     Hypothyroid 2011    Hypothyroidism     MDD (major depressive disorder)     OCD (obsessive compulsive disorder)     Psychiatric disorder     depression --2 yr old daughter  2013    PTSD (post-traumatic stress disorder)     Unspecified adverse effect of anesthesia     per patient with epidural had severe N&V and passed out     Past Surgical History:   Procedure Laterality Date    DELIVERY   11    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, congenital anomaly    HX  SECTION  2016     Family History   Problem Relation Age of Onset    Heart Disease Mother         miltral value prolapse    Hypertension Father     Thyroid Disease Maternal Grandmother     Diabetes Paternal Grandmother     Diabetes Maternal Aunt     Cancer Paternal Grandfather         throat/stomach cancer,  at age 72    Breast Cancer Other         maternal great grandmother     Social History     Tobacco Use    Smoking status: Former Smoker     Packs/day: 0.10     Years: 1.00     Pack years: 0.10     Last attempt to quit: 2018     Years since quittin.1    Smokeless tobacco: Never Used   Substance Use Topics    Alcohol use: No     Comment: social    Drug use: No     Allergies   Allergen Reactions    Ancef [Cefazolin] Hives     Given at c/s, swelling of face/hives, tx'd with benadryl    Pcn [Penicillins] Hives     Facial edema     Peanut Hives    Chocolate [Cocoa] Hives    Citrus And Derivatives Nausea and Vomiting     Oranges only    Clindamycin Other (comments)     Severe ulcerative esophagitis    [place] Hives    Beef Containing Products Hives    Chicken Derived Hives    Corn Hives    Egg Hives    Milk Hives    Milk Prot-Turm-Pepper-Pineappl Hives    Shellfish Derived Hives    Soy Hives    Strawberry Hives     Current Outpatient Medications   Medication Sig    ferrous gluconate (FERATE) 240 mg (27 mg iron) tablet Take 240 mg by mouth three (3) times daily (with meals).  citalopram (CELEXA) 40 mg tablet Take 1 Tab by mouth daily.  levothyroxine (SYNTHROID) 150 mcg tablet Take 1 Tab by mouth Daily (before breakfast).  ondansetron (ZOFRAN ODT) 4 mg disintegrating tablet Take 1 Tab by mouth every eight (8) hours as needed for Nausea.  levothyroxine (SYNTHROID) 137 mcg tablet     aspirin 81 mg chewable tablet Take 81 mg by mouth daily.  aspirin (ASPIRIN) 325 mg tablet Take 325 mg by mouth daily. No current facility-administered medications for this visit.         Review of Systems:  History obtained from the patient  Constitutional: negative for fevers, chills and weight loss  ENT ROS: negative for - hearing change, oral lesions or visual changes  Respiratory: negative for cough, wheezing or dyspnea on exertion  Cardiovascular: negative for chest pain, irregular heart beats, exertional chest pressure/discomfort  Gastrointestinal: negative for dysphagia, nausea and vomiting  Genito-Urinary ROS: no dysuria, trouble voiding, or hematuria  Inteument/breast: see HPI  Musculoskeletal:negative for stiff joints, neck pain and muscle weakness  Endocrine ROS: negative for - breast changes, galactorrhea or temperature intolerance  Hematological and Lymphatic ROS: negative for - blood clots, bruising or swollen lymph nodes    Physical Exam:  Visit Vitals  /64 (BP 1 Location: Right arm, BP Patient Position: Sitting)   Ht 5' 2\" (1.575 m)   Wt 207 lb 3.2 oz (94 kg)   Breastfeeding? No   BMI 37.90 kg/m²       GENERAL: alert, well appearing, and in no distress  HEAD: normocephalic, atraumatic. NECK:  supple, no significant adenopathy, no palpable masses  PULM: clear to auscultation, no wheezes, rales or rhonchi, symmetric air entry   COR: normal rate and regular rhythm, S1 and S2 normal   BACK: no cvat  ABDOMEN: soft, nontender, nondistended, no masses or organomegaly   BREAST:   Right breast appear normal, no suspicious masses, no skin or nipple changes or axillary nodes. Left breast appear normal, no suspicious masses, no skin or nipple changes or axillary nodes      NEURO: alert, oriented, normal speech  SKIN: no breast skin changes    Assessment:  Encounter Diagnoses   Name Primary?  Missed menses Yes    Breast pain, left        Plan:  The patient is advised that she should contact the office if she does not note improvement or if symptoms recur. She should contact our office with any questions or concerns. She could keep her routine annual exam appointment. We reviewed self breast exams with the patient. We recommend follow up with PCP for non-gynecologic complaints and chronic medical problems.       Discussed risks, benefits and alternatives of OCP/nuvaring/patch: including but not limited to dvt/pe/mi/cva/ca/gi risks and that smoking, increasing age and other health conditions can increase these risks.    Contraception h/o  rec avoid estrogen because of risks  WWE: due      Orders Placed This Encounter    REFERRAL TO BREAST SURGERY    AMB POC URINE PREGNANCY TEST, VISUAL COLOR COMPARISON

## 2019-07-30 NOTE — PATIENT INSTRUCTIONS
Breast Lumps: Care Instructions  Your Care Instructions  Breast lumps are common, especially in women between ages 27 and 48. Many women's breasts feel lumpy and tender before their menstrual period. Women also may have lumps when they are breastfeeding. Breast lumps may go away after menopause. All new breast lumps in women after menopause should be checked by a doctor. Although lumps may be normal for you, it is important to have your doctor check any lump or thickness that is not like the rest of your breast to make sure it is not cancer. A lump may be larger, harder, or different from the rest of your breast tissue. Follow-up care is a key part of your treatment and safety. Be sure to make and go to all appointments, and call your doctor if you are having problems. It's also a good idea to know your test results and keep a list of the medicines you take. How can you care for yourself at home? · Make an appointment to have a mammogram and other follow-up visits as recommended by your doctor. When should you call for help? Watch closely for changes in your health, and be sure to contact your doctor if:    · You do not get better as expected.     · Your breast has changed.     · You have pain in your breast.     · You have a discharge from your nipple.     · A breast lump changes or does not go away. Where can you learn more? Go to http://mamie-sandrita.info/. Enter O213 in the search box to learn more about \"Breast Lumps: Care Instructions. \"  Current as of: February 19, 2019  Content Version: 12.1  © 7949-5575 [employer]. Care instructions adapted under license by [employer] (which disclaims liability or warranty for this information). If you have questions about a medical condition or this instruction, always ask your healthcare professional. [address] any warranty or liability for your use of this information.

## 2019-09-07 LAB
T4 FREE SERPL-MCNC: 1.21 NG/DL (ref 0.82–1.77)
TSH SERPL DL<=0.005 MIU/L-ACNC: 8.44 UIU/ML (ref 0.45–4.5)

## 2019-09-11 ENCOUNTER — TELEPHONE (OUTPATIENT)
Dept: FAMILY MEDICINE CLINIC | Age: 29
End: 2019-09-11

## 2019-09-11 DIAGNOSIS — E03.9 ACQUIRED HYPOTHYROIDISM: Primary | ICD-10-CM

## 2019-09-11 RX ORDER — LEVOTHYROXINE SODIUM 150 MCG
150 TABLET ORAL
Qty: 90 TAB | Refills: 1 | Status: SHIPPED | OUTPATIENT
Start: 2019-09-11 | End: 2020-08-04

## 2019-09-11 NOTE — TELEPHONE ENCOUNTER
The pt is calling wanting her lab results, also wants to talk to the nurse about some issues she is having.      Best contact info is [phone]

## 2019-09-11 NOTE — TELEPHONE ENCOUNTER
Spoke with patient myself. TSH remains elevated with normal FT4. She reports compliance with medication and takes daily on an empty stomach. Will change to brand Synthroid and will repeat TSH in 6 weeks. Patient verbalizes understanding.

## 2019-10-23 DIAGNOSIS — E03.9 ACQUIRED HYPOTHYROIDISM: ICD-10-CM

## 2020-01-15 RX ORDER — CITALOPRAM 40 MG/1
40 TABLET, FILM COATED ORAL DAILY
Qty: 90 TAB | Refills: 1 | Status: SHIPPED | OUTPATIENT
Start: 2020-01-15

## 2020-01-26 ENCOUNTER — APPOINTMENT (OUTPATIENT)
Dept: GENERAL RADIOLOGY | Age: 30
End: 2020-01-26
Attending: EMERGENCY MEDICINE
Payer: COMMERCIAL

## 2020-01-26 ENCOUNTER — HOSPITAL ENCOUNTER (EMERGENCY)
Age: 30
Discharge: HOME | End: 2020-01-26
Attending: EMERGENCY MEDICINE
Payer: COMMERCIAL

## 2020-01-26 VITALS
TEMPERATURE: 97.8 F | RESPIRATION RATE: 16 BRPM | WEIGHT: 206.57 LBS | HEIGHT: 62 IN | OXYGEN SATURATION: 100 % | HEART RATE: 108 BPM | DIASTOLIC BLOOD PRESSURE: 64 MMHG | SYSTOLIC BLOOD PRESSURE: 125 MMHG | BODY MASS INDEX: 38.01 KG/M2

## 2020-01-26 DIAGNOSIS — J45.20 MILD INTERMITTENT REACTIVE AIRWAY DISEASE WITHOUT COMPLICATION: ICD-10-CM

## 2020-01-26 DIAGNOSIS — R06.02 SOB (SHORTNESS OF BREATH): Primary | ICD-10-CM

## 2020-01-26 PROCEDURE — 74011636637 HC RX REV CODE- 636/637: Performed by: EMERGENCY MEDICINE

## 2020-01-26 PROCEDURE — 77030013140 HC MSK NEB VYRM -A

## 2020-01-26 PROCEDURE — 71046 X-RAY EXAM CHEST 2 VIEWS: CPT

## 2020-01-26 PROCEDURE — 99283 EMERGENCY DEPT VISIT LOW MDM: CPT

## 2020-01-26 PROCEDURE — 94640 AIRWAY INHALATION TREATMENT: CPT

## 2020-01-26 PROCEDURE — 74011000250 HC RX REV CODE- 250: Performed by: EMERGENCY MEDICINE

## 2020-01-26 RX ORDER — ALBUTEROL SULFATE 90 UG/1
2 AEROSOL, METERED RESPIRATORY (INHALATION)
Qty: 1 INHALER | Refills: 0 | Status: SHIPPED | OUTPATIENT
Start: 2020-01-26

## 2020-01-26 RX ORDER — PREDNISONE 50 MG/1
50 TABLET ORAL DAILY
Qty: 3 TAB | Refills: 0 | Status: SHIPPED | OUTPATIENT
Start: 2020-01-26 | End: 2020-01-29

## 2020-01-26 RX ORDER — PREDNISONE 20 MG/1
60 TABLET ORAL
Status: COMPLETED | OUTPATIENT
Start: 2020-01-26 | End: 2020-01-26

## 2020-01-26 RX ORDER — FAMOTIDINE 20 MG/1
20 TABLET, FILM COATED ORAL 2 TIMES DAILY
COMMUNITY

## 2020-01-26 RX ADMIN — PREDNISONE 60 MG: 20 TABLET ORAL at 22:43

## 2020-01-26 RX ADMIN — ALBUTEROL SULFATE 1 DOSE: 2.5 SOLUTION RESPIRATORY (INHALATION) at 22:44

## 2020-01-27 NOTE — ED NOTES
Nebulizer started for pt. Pt on monitor x1. Family member at bedside. Call bell within reach.  Will continue to monitor patient

## 2020-01-27 NOTE — ED NOTES
The patient was discharged home by Dr. Yadiel Johnson in stable condition. The patient is alert and oriented, in no respiratory distress and discharge vital signs obtained. The patient's diagnosis, condition and treatment were explained. The patient expressed understanding. A discharge plan has been developed. Aftercare instructions were given. Pt ambulatory out of the ED.

## 2020-01-27 NOTE — DISCHARGE INSTRUCTIONS
We hope that we have addressed all of your medical concerns. The examination and treatment you received in the Emergency Department were for an emergent problem and were not intended as complete care. It is important that you follow up with your healthcare provider(s) for ongoing care. If your symptoms worsen or do not improve as expected, and you are unable to reach your usual health care provider(s), you should return to the Emergency Department. Today's healthcare is undergoing tremendous change, and patient satisfaction surveys are one of the many tools to assess the quality of medical care. You may receive a survey from the [employer] regarding your experience in the Emergency Department. I hope that your experience has been completely positive, particularly the medical care that I provided. As such, please participate in the survey; anything less than excellent does not meet my expectations or intentions. [facility] and [facility] participate in nationally recognized quality of care measures. If your blood pressure is greater than 120/80, as reported below, we urge that you seek medical care to address the potential of high blood pressure, commonly known as hypertension. Hypertension can be hereditary or can be caused by certain medical conditions, pain, stress, or \"white coat syndrome. \"       Please make an appointment with your health care provider(s) for follow up of your Emergency Department visit. VITALS:   Patient Vitals for the past 8 hrs:   Temp Pulse Resp BP SpO2   01/26/20 2314 97.8 °F (36.6 °C) (!) 108 16 125/64 100 %   01/26/20 2303 -- -- -- -- 100 %   01/26/20 2243 -- -- -- -- 99 %   01/26/20 2231 97.6 °F (36.4 °C) 80 16 119/77 99 %          Thank you for allowing us to provide you with medical care today. We realize that you have many choices for your emergency care needs.   Please choose us in the future for any continued health care needs. Aicha Hancock [address]: [phone]            No results found for this or any previous visit (from the past 24 hour(s)). Xr Chest Pa Lat    Result Date: 1/26/2020  INDICATION:   pneumonia COMPARISON: August 1, 2018 FINDINGS: Frontal and lateral views of the chest demonstrate a normal cardiomediastinal silhouette. The lungs are adequately expanded. There is no edema, effusion, consolidation, or pneumothorax. The osseous structures are unremarkable. IMPRESSION: No acute process. Patient Education        Shortness of Breath: Care Instructions  Your Care Instructions  Shortness of breath has many causes. Sometimes conditions such as anxiety can lead to shortness of breath. Some people get mild shortness of breath when they exercise. Trouble breathing also can be a symptom of a serious problem, such as asthma, lung disease, emphysema, heart problems, and pneumonia. If your shortness of breath continues, you may need tests and treatment. Watch for any changes in your breathing and other symptoms. Follow-up care is a key part of your treatment and safety. Be sure to make and go to all appointments, and call your doctor if you are having problems. It's also a good idea to know your test results and keep a list of the medicines you take. How can you care for yourself at home? · Do not smoke or allow others to smoke around you. If you need help quitting, talk to your doctor about stop-smoking programs and medicines. These can increase your chances of quitting for good. · Get plenty of rest and sleep. · Take your medicines exactly as prescribed. Call your doctor if you think you are having a problem with your medicine. · Find healthy ways to deal with stress. ? Exercise daily. ? Get plenty of sleep. ? Eat regularly and well. When should you call for help?   Call 911 anytime you think you may need emergency care. For example, call if:    · You have severe shortness of breath.     · You have symptoms of a heart attack. These may include:  ? Chest pain or pressure, or a strange feeling in the chest.  ? Sweating. ? Shortness of breath. ? Nausea or vomiting. ? Pain, pressure, or a strange feeling in the back, neck, jaw, or upper belly or in one or both shoulders or arms. ? Lightheadedness or sudden weakness. ? A fast or irregular heartbeat. After you call 911, the  may tell you to chew 1 adult-strength or 2 to 4 low-dose aspirin. Wait for an ambulance. Do not try to drive yourself.    Call your doctor now or seek immediate medical care if:    · Your shortness of breath gets worse or you start to wheeze. Wheezing is a high-pitched sound when you breathe.     · You wake up at night out of breath or have to prop your head up on several pillows to breathe.     · You are short of breath after only light activity or while at rest.    Watch closely for changes in your health, and be sure to contact your doctor if:    · You do not get better over the next 1 to 2 days. Where can you learn more? Go to http://mamie-sandrita.info/. Enter S780 in the search box to learn more about \"Shortness of Breath: Care Instructions. \"  Current as of: June 9, 2019  Content Version: 12.2  © 0619-7307 [employer]. Care instructions adapted under license by [employer] (which disclaims liability or warranty for this information). If you have questions about a medical condition or this instruction, always ask your healthcare professional. [address] any warranty or liability for your use of this information.

## 2020-01-27 NOTE — ED TRIAGE NOTES
Pt states she is having trouble breathing x2 weeks. Reports non productive cough, no fever, no N/V, chest tightness.

## 2020-01-27 NOTE — ED PROVIDER NOTES
This is a 73-year-old female comes emergency room with chief complaint of cough and shortness of breath. Patient states that this been going for the past 1 to 2 weeks. Patient states that her cough is been nonproductive. Patient denies any fever or chills. Patient denies any chest pain. Patient states that her chest is feels tight like she cannot take a deep breath in. Patient denies any abdominal pain. Patient is any urinary symptoms. Patient denies smoking but is around other people who do smoke. The history is provided by the patient. No  was used. Shortness of Breath   This is a new problem. The problem occurs frequently. The current episode started more than 1 week ago. The problem has been gradually worsening. Associated symptoms include cough. Pertinent negatives include no fever, no headaches, no rhinorrhea, no swollen glands, no ear pain, no sputum production, no wheezing, no chest pain, no syncope, no vomiting, no abdominal pain, no rash, no leg pain and no leg swelling. It is unknown what precipitated the problem. She has tried nothing for the symptoms. Associated medical issues do not include asthma, COPD or pneumonia.         Past Medical History:   Diagnosis Date    Anxiety     Calculus of kidney     Depression     Dissociative disorder     Environmental allergies     GERD (gastroesophageal reflux disease)     Hashimoto's thyroiditis 10/24/2011    Hashimoto's thyroiditis 10/24/2011    History of pulmonary embolus (PE) 2018    Hypotension     Hypothyroid 2011    Hypothyroidism     MDD (major depressive disorder)     OCD (obsessive compulsive disorder)     Psychiatric disorder     depression --2 yr old daughter  2013    PTSD (post-traumatic stress disorder)     Unspecified adverse effect of anesthesia     per patient with epidural had severe N&V and passed out       Past Surgical History:   Procedure Laterality Date    DELIVERY  11    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, congenital anomaly    HX  SECTION  2016         Family History:   Problem Relation Age of Onset    Heart Disease Mother         miltral value prolapse    Hypertension Father     Thyroid Disease Maternal Grandmother     Diabetes Paternal Grandmother     Diabetes Maternal Aunt     Cancer Paternal Grandfather         throat/stomach cancer,  at age 72    Breast Cancer Other         maternal great grandmother       Social History     Socioeconomic History    Marital status: LEGALLY      Spouse name: Not on file    Number of children: Not on file    Years of education: Not on file    Highest education level: Not on file   Occupational History    Not on file   Social Needs    Financial resource strain: Not on file    Food insecurity:     Worry: Not on file     Inability: Not on file    Transportation needs:     Medical: Not on file     Non-medical: Not on file   Tobacco Use    Smoking status: Former Smoker     Packs/day: 0.10     Years: 1.00     Pack years: 0.10     Last attempt to quit: 2018     Years since quittin.6    Smokeless tobacco: Never Used   Substance and Sexual Activity    Alcohol use: Yes     Comment: social    Drug use: No    Sexual activity: Yes     Partners: Male     Birth control/protection: Condom   Lifestyle    Physical activity:     Days per week: Not on file     Minutes per session: Not on file    Stress: Not on file   Relationships    Social connections:     Talks on phone: Not on file     Gets together: Not on file     Attends Sabianism service: Not on file     Active member of club or organization: Not on file     Attends meetings of clubs or organizations: Not on file     Relationship status: Not on file    Intimate partner violence:     Fear of current or ex partner: Not on file     Emotionally abused: Not on file     Physically abused: Not on file     Forced sexual activity: Not on file   Other Topics Concern    Not on file   Social History Narrative    Not on file     ALLERGIES: Ancef [cefazolin]; Pcn [penicillins]; Peanut; Chocolate [cocoa]; Citrus and derivatives; Clindamycin; [place]; Beef containing products; Chicken derived; Corn; Egg; Milk; Milk prot-turm-pepper-pineappl; Shellfish derived; Soy; and [place]    Review of Systems   Constitutional: Negative for appetite change, chills, fever and unexpected weight change. HENT: Negative for ear pain, hearing loss, rhinorrhea and trouble swallowing. Eyes: Negative for pain and visual disturbance. Respiratory: Positive for cough, chest tightness and shortness of breath. Negative for sputum production and wheezing. Cardiovascular: Negative for chest pain, palpitations, leg swelling and syncope. Gastrointestinal: Negative for abdominal distention, abdominal pain, blood in stool and vomiting. Genitourinary: Negative for dysuria, hematuria and urgency. Musculoskeletal: Negative for back pain and myalgias. Skin: Negative for rash. Neurological: Negative for dizziness, syncope, weakness, numbness and headaches. Psychiatric/Behavioral: Negative for confusion and suicidal ideas. All other systems reviewed and are negative. Vitals:    01/26/20 2231 01/26/20 2243 01/26/20 2303 01/26/20 2314   BP: 119/77   125/64   Pulse: 80   (!) 108   Resp: 16   16   Temp: 97.6 °F (36.4 °C)   97.8 °F (36.6 °C)   SpO2: 99% 99% 100% 100%   Weight: 93.7 kg (206 lb 9.1 oz)      Height: 5' 2\" (1.575 m)               Physical Exam  Vitals signs and nursing note reviewed. Constitutional:       General: She is not in acute distress. Appearance: Normal appearance. She is well-developed. She is not ill-appearing, toxic-appearing or diaphoretic. HENT:      Head: Normocephalic and atraumatic. Right Ear: Tympanic membrane, ear canal and external ear normal. There is no impacted cerumen.       Left Ear: Tympanic membrane, ear canal and external ear normal. There is no impacted cerumen. Nose: Nose normal.      Mouth/Throat:      Mouth: Mucous membranes are moist.      Pharynx: No oropharyngeal exudate or posterior oropharyngeal erythema. Eyes:      General: No scleral icterus. Right eye: No discharge. Left eye: No discharge. Extraocular Movements: Extraocular movements intact. Conjunctiva/sclera: Conjunctivae normal.      Pupils: Pupils are equal, round, and reactive to light. Neck:      Musculoskeletal: Normal range of motion and neck supple. Vascular: No JVD. Trachea: No tracheal deviation. Cardiovascular:      Rate and Rhythm: Normal rate and regular rhythm. Heart sounds: Normal heart sounds. No murmur. No friction rub. No gallop. Pulmonary:      Effort: Pulmonary effort is normal. Prolonged expiration present. No respiratory distress. Breath sounds: Normal breath sounds. No stridor. No decreased breath sounds, wheezing, rhonchi or rales. Chest:      Chest wall: No tenderness. Abdominal:      General: Bowel sounds are normal. There is no distension. Palpations: Abdomen is soft. Tenderness: There is no tenderness. There is no guarding or rebound. Musculoskeletal: Normal range of motion. General: No tenderness. Skin:     General: Skin is warm and dry. Capillary Refill: Capillary refill takes less than 2 seconds. Coloration: Skin is not pale. Findings: No erythema or rash. Neurological:      General: No focal deficit present. Mental Status: She is alert and oriented to person, place, and time. GCS: GCS eye subscore is 4. GCS verbal subscore is 5. GCS motor subscore is 6. Cranial Nerves: No cranial nerve deficit. Sensory: No sensory deficit. Motor: No weakness or abnormal muscle tone. Coordination: Coordination normal.      Deep Tendon Reflexes: Reflexes are normal and symmetric.  Reflexes normal.   Psychiatric:         Mood and Affect: Mood normal.         Behavior: Behavior normal.         Thought Content: Thought content normal.         Judgment: Judgment normal.          MDM  Number of Diagnoses or Management Options  Mild intermittent reactive airway disease without complication:   SOB (shortness of breath):      Amount and/or Complexity of Data Reviewed  Tests in the radiology section of CPT®: ordered and reviewed    Risk of Complications, Morbidity, and/or Mortality  Presenting problems: moderate  Diagnostic procedures: low  Management options: moderate    Patient Progress  Patient progress: improved       Procedures    Chief Complaint   Patient presents with    Shortness of Breath       The patient's presenting problems have been discussed, and they are in agreement with the care plan formulated and outlined with them. I have encouraged them to ask questions as they arise throughout their visit. MEDICATIONS GIVEN:  Medications   albuterol 5mg / ipratropium 0.5mg neb solution (1 Dose Nebulization Given 1/26/20 2244)   predniSONE (DELTASONE) tablet 60 mg (60 mg Oral Given 1/26/20 2243)       LABS REVIEWED:  No results found for this or any previous visit (from the past 24 hour(s)). VITAL SIGNS:  Patient Vitals for the past 24 hrs:   Temp Pulse Resp BP SpO2   01/26/20 2314 97.8 °F (36.6 °C) (!) 108 16 125/64 100 %   01/26/20 2303     100 %   01/26/20 2243     99 %   01/26/20 2231 97.6 °F (36.4 °C) 80 16 119/77 99 %       RADIOLOGY RESULTS:  The following have been ordered and reviewed:  Xr Chest Pa Lat    Result Date: 1/26/2020  INDICATION:   pneumonia COMPARISON: August 1, 2018 FINDINGS: Frontal and lateral views of the chest demonstrate a normal cardiomediastinal silhouette. The lungs are adequately expanded. There is no edema, effusion, consolidation, or pneumothorax. The osseous structures are unremarkable. IMPRESSION: No acute process. PROGRESS NOTES:  Patient feeling better after neb treatment.  Discussed results and plan with patient. Patient will be discharged home with PCP follow up. Patient instructed to return to the emergency room for any worsening symptoms or any other concerns. DIAGNOSIS:    1. SOB (shortness of breath)    2. Mild intermittent reactive airway disease without complication        PLAN:  Follow-up Information     Follow up With Specialties Details Why Contact Info    Casimiro Lara MD Family Practice Schedule an appointment as soon as possible for a visit  [address] [place] [address] DEP Emergency Medicine  If symptoms worsen [address]  [phone]        Discharge Medication List as of 1/26/2020 11:31 PM      START taking these medications    Details   predniSONE (DELTASONE) 50 mg tablet Take 1 Tab by mouth daily for 3 days. , Print, Disp-3 Tab, R-0      albuterol (PROVENTIL HFA, VENTOLIN HFA, PROAIR HFA) 90 mcg/actuation inhaler Take 2 Puffs by inhalation every four (4) hours as needed for Wheezing or Shortness of Breath., Print, Disp-1 Inhaler, R-0         CONTINUE these medications which have NOT CHANGED    Details   famotidine (PEPCID) 20 mg tablet Take 20 mg by mouth two (2) times a day., Historical Med      citalopram (CELEXA) 40 mg tablet Take 1 Tab by mouth daily. , Normal, Disp-90 Tab, R-1      SYNTHROID 150 mcg tablet Take 1 Tab by mouth Daily (before breakfast). , Normal, Disp-90 Tab, R-1, MARKO      ferrous gluconate (FERATE) 240 mg (27 mg iron) tablet Take 65 mg by mouth once., Historical Med             ED COURSE: The patient's hospital course has been uncomplicated.

## 2020-01-29 ENCOUNTER — OFFICE VISIT (OUTPATIENT)
Dept: FAMILY MEDICINE CLINIC | Age: 30
End: 2020-01-29

## 2020-01-29 VITALS
HEIGHT: 62 IN | RESPIRATION RATE: 18 BRPM | HEART RATE: 82 BPM | TEMPERATURE: 97.9 F | WEIGHT: 205 LBS | SYSTOLIC BLOOD PRESSURE: 100 MMHG | OXYGEN SATURATION: 97 % | BODY MASS INDEX: 37.73 KG/M2 | DIASTOLIC BLOOD PRESSURE: 60 MMHG

## 2020-01-29 DIAGNOSIS — J45.20 MILD INTERMITTENT REACTIVE AIRWAY DISEASE WITHOUT COMPLICATION: Primary | ICD-10-CM

## 2020-01-29 DIAGNOSIS — E06.3 HASHIMOTO'S THYROIDITIS: ICD-10-CM

## 2020-01-29 DIAGNOSIS — D64.9 ANEMIA, UNSPECIFIED TYPE: ICD-10-CM

## 2020-01-29 PROBLEM — Z86.711 HISTORY OF PULMONARY EMBOLISM: Status: ACTIVE | Noted: 2018-07-03

## 2020-01-29 RX ORDER — FLUTICASONE PROPIONATE 44 UG/1
1 AEROSOL, METERED RESPIRATORY (INHALATION) 2 TIMES DAILY
Qty: 1 INHALER | Refills: 2 | Status: SHIPPED | OUTPATIENT
Start: 2020-01-29

## 2020-01-29 NOTE — PROGRESS NOTES
Subjective:      Namrata Tobar is a 34 y.o. female here for ED follow up. Evaluated at [facility] 1/26/20 for shortness of breath. CXR with no acute process. Treated with duo-neb x 1 with improvement noted. Discharged home on albuterol and prednisone for 3 days (last day today). Reports h/o exercise induced asthma as a child. States that she has chest tightness and feels like \"there is a weight on my lungs\". Onset was about 1.5 weeks ago. Associated with dry cough. Using albuterol every 4 hours and lasting for about 30-60 minutes. Due for repeat labs. Current Outpatient Medications   Medication Sig Dispense Refill    famotidine (PEPCID) 20 mg tablet Take 20 mg by mouth two (2) times a day.  predniSONE (DELTASONE) 50 mg tablet Take 1 Tab by mouth daily for 3 days. 3 Tab 0    albuterol (PROVENTIL HFA, VENTOLIN HFA, PROAIR HFA) 90 mcg/actuation inhaler Take 2 Puffs by inhalation every four (4) hours as needed for Wheezing or Shortness of Breath. 1 Inhaler 0    citalopram (CELEXA) 40 mg tablet Take 1 Tab by mouth daily. 90 Tab 1    SYNTHROID 150 mcg tablet Take 1 Tab by mouth Daily (before breakfast). 90 Tab 1    ferrous gluconate (FERATE) 240 mg (27 mg iron) tablet Take 65 mg by mouth once.          Allergies   Allergen Reactions    Ancef [Cefazolin] Hives     Given at c/s, swelling of face/hives, tx'd with benadryl    Pcn [Penicillins] Hives     Facial edema     Peanut Hives    Chocolate [Cocoa] Hives    Citrus And Derivatives Nausea and Vomiting     Oranges only    Clindamycin Other (comments)     Severe ulcerative esophagitis    [place] Hives    Beef Containing Products Hives    Chicken Derived Hives    Corn Hives    Egg Hives    Milk Hives    Milk Prot-Turm-Pepper-Pineappl Hives    Shellfish Derived Hives    Soy Hives    Strawberry Hives       Past Medical History:   Diagnosis Date    Anxiety     Calculus of kidney     Depression     Dissociative disorder     Environmental allergies     GERD (gastroesophageal reflux disease)     Hashimoto's thyroiditis 10/24/2011    Hashimoto's thyroiditis 10/24/2011    History of pulmonary embolus (PE)     Hypotension     Hypothyroid 2011    Hypothyroidism     MDD (major depressive disorder)     OCD (obsessive compulsive disorder)     Psychiatric disorder     depression --2 yr old daughter  2013    PTSD (post-traumatic stress disorder)     Unspecified adverse effect of anesthesia     per patient with epidural had severe N&V and passed out       Social History     Tobacco Use    Smoking status: Former Smoker     Packs/day: 0.10     Years: 1.00     Pack years: 0.10     Last attempt to quit: 2018     Years since quittin.6    Smokeless tobacco: Never Used   Substance Use Topics    Alcohol use: Yes     Comment: social        Review of Systems  Pertinent items are noted in HPI. Objective:     Visit Vitals  /60 (BP 1 Location: Right arm, BP Patient Position: Sitting) Comment: Manual   Pulse 82   Temp 97.9 °F (36.6 °C) (Oral) Comment: .   Resp 18   Ht 5' 2\" (1.575 m)   Wt 205 lb (93 kg)   LMP 2020 (Approximate)   SpO2 97%   BMI 37.49 kg/m²      General appearance - alert, well appearing, and in no distress  Chest - clear to auscultation, no wheezes, rales or rhonchi, symmetric air entry, no tachypnea, retractions or cyanosis  Heart - normal rate, regular rhythm, normal S1, S2, no murmurs, rubs, clicks or gallops    Assessment/Plan:   Lindsay Rowe is a 34 y.o. female seen for:     1. Mild intermittent reactive airway disease without complication  - fluticasone propionate (FLOVENT HFA) 44 mcg/actuation inhaler; Take 1 Puff by inhalation two (2) times a day. Dispense: 1 Inhaler; Refill: 2  - continue with albuterol PRN  - discussed pulmonology evaluation if no improvement; information provided to [facility]    2. Hashimoto's thyroiditis: reports compliance with Synthroid. Check TFTs and adjust medication as needed. - TSH 3RD GENERATION  - T4, FREE    3. Anemia, unspecified type: check CBC and iron studies.   - CBC WITH AUTOMATED DIFF  - IRON PROFILE  - FERRITIN    I have discussed the diagnosis with the patient and the intended plan as seen in the above orders. The patient has received an after-visit summary and questions were answered concerning future plans. I have discussed medication side effects and warnings with the patient as well. Patient verbalizes understanding of plan of care and denies further questions or concerns at this time. Informed patient to return to the office if symptoms worsen or if new symptoms arise. Follow-up and Dispositions    · Return if symptoms worsen or fail to improve.

## 2020-01-29 NOTE — PATIENT INSTRUCTIONS
Reactive Airway Disease: Care Instructions Your Care Instructions Reactive airway disease is a breathing problem that appears as wheezing, a whistling noise in your airways. It may be caused by a viral or bacterial infection, allergies, tobacco smoke, or something else in the environment. When you are around these triggers, your body releases chemicals that make the airways get tight. Reactive airway disease is a lot like asthma. Both can cause wheezing. But asthma is ongoing, while reactive airway disease may occur only now and then. Tests can be done to tell whether you have asthma. You may take the same medicines used to treat asthma. Good home care and follow-up care with your doctor can help you recover. Follow-up care is a key part of your treatment and safety. Be sure to make and go to all appointments, and call your doctor if you are having problems. It's also a good idea to know your test results and keep a list of the medicines you take. How can you care for yourself at home? · Take your medicines exactly as prescribed. Call your doctor if you think you are having a problem with your medicine. · Do not smoke or allow others to smoke around you. If you need help quitting, talk to your doctor about stop-smoking programs and medicines. These can increase your chances of quitting for good. · If you know what caused your wheezing (such as perfume or the odor of household chemicals), try to avoid it in the future. · Wash your hands several times a day, and consider using hand gels or wipes that contain alcohol. This can prevent colds and other infections. When should you call for help? Call 911 anytime you think you may need emergency care. For example, call if: 
  · You have severe trouble breathing.  
 Watch closely for changes in your health, and be sure to contact your doctor if: 
  · You cough up yellow, dark brown, or bloody mucus.  
  · You have a fever.  
  · Your wheezing gets worse. Where can you learn more? Go to http://mamie-sandrita.info/. Enter U421 in the search box to learn more about \"Reactive Airway Disease: Care Instructions. \" Current as of: June 9, 2019 Content Version: 12.2 © 2344-9631 [employer]. Care instructions adapted under license by [employer] (which disclaims liability or warranty for this information). If you have questions about a medical condition or this instruction, always ask your healthcare professional. [address] any warranty or liability for your use of this information.

## 2020-01-29 NOTE — PROGRESS NOTES
Identified pt with two pt identifiers(name and ).     Chief Complaint   Patient presents with   [facility] Follow Up        Health Maintenance Due   Topic    Pneumococcal 0-64 years (1 of 1 - PPSV23)    PAP AKA CERVICAL CYTOLOGY        Wt Readings from Last 3 Encounters:   20 205 lb (93 kg)   20 206 lb 9.1 oz (93.7 kg)   19 207 lb 3.2 oz (94 kg)     Temp Readings from Last 3 Encounters:   20 97.9 °F (36.6 °C) (Oral)   20 97.8 °F (36.6 °C)   19 98.4 °F (36.9 °C) (Oral)     BP Readings from Last 3 Encounters:   20 100/60   20 125/64   19 122/64     Pulse Readings from Last 3 Encounters:   20 82   20 (!) 108   19 85         Learning Assessment:  :     Learning Assessment 2015 2014 3/21/2014   PRIMARY LEARNER Patient Patient Patient   HIGHEST LEVEL OF EDUCATION - PRIMARY LEARNER  GRADUATED HIGH SCHOOL OR GED GRADUATED HIGH SCHOOL OR GED GRADUATED HIGH SCHOOL OR GED   BARRIERS PRIMARY LEARNER NONE NONE NONE   CO-LEARNER CAREGIVER No No -   PRIMARY LANGUAGE ENGLISH ENGLISH ENGLISH   LEARNER PREFERENCE PRIMARY READING DEMONSTRATION DEMONSTRATION   ANSWERED BY Patient patient patient   RELATIONSHIP SELF SELF SELF       Depression Screening:  :     3 most recent PHQ Screens 3/6/2018   Little interest or pleasure in doing things Not at all   Feeling down, depressed, irritable, or hopeless Not at all   Total Score PHQ 2 0   Trouble falling or staying asleep, or sleeping too much -   Feeling tired or having little energy -   Poor appetite, weight loss, or overeating -   Feeling bad about yourself - or that you are a failure or have let yourself or your family down -   Trouble concentrating on things such as school, work, reading, or watching TV -   Moving or speaking so slowly that other people could have noticed; or the opposite being so fidgety that others notice -   Thoughts of being better off dead, or hurting yourself in some way -   PHQ 9 Score -   How difficult have these problems made it for you to do your work, take care of your home and get along with others -       Fall Risk Assessment:  :     No flowsheet data found. Abuse Screening:  :     Abuse Screening Questionnaire 6/29/2018 10/27/2016 5/22/2014   Do you ever feel afraid of your partner? N N N   Are you in a relationship with someone who physically or mentally threatens you? N N N   Is it safe for you to go home? Y Y Y       Coordination of Care Questionnaire:  :     1) Have you been to an emergency room, urgent care clinic since your last visit? no   Hospitalized since your last visit? no             2) Have you seen or consulted any other health care providers outside of [address] [place] [address] since your last visit? no  (Include any pap smears or colon screenings in this section.)    3) Do you have an Advance Directive on file? no  Are you interested in receiving information about Advance Directives? no    Reviewed record in preparation for visit and have obtained necessary documentation. Medication reconciliation up to date and corrected with patient at this time.

## 2020-01-30 LAB
BASOPHILS # BLD AUTO: 0 X10E3/UL (ref 0–0.2)
BASOPHILS NFR BLD AUTO: 0 %
EOSINOPHIL # BLD AUTO: 0 X10E3/UL (ref 0–0.4)
EOSINOPHIL NFR BLD AUTO: 0 %
ERYTHROCYTE [DISTWIDTH] IN BLOOD BY AUTOMATED COUNT: 16.2 % (ref 11.7–15.4)
FERRITIN SERPL-MCNC: 24 NG/ML (ref 15–150)
HCT VFR BLD AUTO: 35.2 % (ref 34–46.6)
HGB BLD-MCNC: 11.1 G/DL (ref 11.1–15.9)
IMM GRANULOCYTES # BLD AUTO: 0 X10E3/UL (ref 0–0.1)
IMM GRANULOCYTES NFR BLD AUTO: 0 %
IRON SATN MFR SERPL: 22 % (ref 15–55)
IRON SERPL-MCNC: 64 UG/DL (ref 27–159)
LYMPHOCYTES # BLD AUTO: 1.1 X10E3/UL (ref 0.7–3.1)
LYMPHOCYTES NFR BLD AUTO: 13 %
MCH RBC QN AUTO: 27.3 PG (ref 26.6–33)
MCHC RBC AUTO-ENTMCNC: 31.5 G/DL (ref 31.5–35.7)
MCV RBC AUTO: 87 FL (ref 79–97)
MONOCYTES # BLD AUTO: 0.2 X10E3/UL (ref 0.1–0.9)
MONOCYTES NFR BLD AUTO: 3 %
NEUTROPHILS # BLD AUTO: 6.7 X10E3/UL (ref 1.4–7)
NEUTROPHILS NFR BLD AUTO: 84 %
PLATELET # BLD AUTO: 368 X10E3/UL (ref 150–450)
RBC # BLD AUTO: 4.07 X10E6/UL (ref 3.77–5.28)
T4 FREE SERPL-MCNC: 1.83 NG/DL (ref 0.82–1.77)
TIBC SERPL-MCNC: 293 UG/DL (ref 250–450)
TSH SERPL DL<=0.005 MIU/L-ACNC: 0.09 UIU/ML (ref 0.45–4.5)
UIBC SERPL-MCNC: 229 UG/DL (ref 131–425)
WBC # BLD AUTO: 8 X10E3/UL (ref 3.4–10.8)

## 2020-07-30 DIAGNOSIS — E03.9 ACQUIRED HYPOTHYROIDISM: ICD-10-CM

## 2020-08-01 DIAGNOSIS — E03.9 ACQUIRED HYPOTHYROIDISM: ICD-10-CM

## 2020-08-04 RX ORDER — LEVOTHYROXINE SODIUM 150 MCG
TABLET ORAL
Qty: 90 TAB | Refills: 0 | Status: SHIPPED | OUTPATIENT
Start: 2020-08-04 | End: 2020-08-04 | Stop reason: SDUPTHER

## 2020-08-04 RX ORDER — LEVOTHYROXINE SODIUM 150 MCG
150 TABLET ORAL
Qty: 90 TAB | Refills: 0 | Status: SHIPPED | OUTPATIENT
Start: 2020-08-04

## 2020-12-21 ENCOUNTER — HOSPITAL ENCOUNTER (OUTPATIENT)
Dept: GENERAL RADIOLOGY | Age: 30
Discharge: HOME | End: 2020-12-21
Payer: COMMERCIAL

## 2020-12-21 ENCOUNTER — TRANSCRIBE ORDER (OUTPATIENT)
Dept: EMERGENCY DEPT | Age: 30
End: 2020-12-21

## 2020-12-21 DIAGNOSIS — R06.02 SHORTNESS OF BREATH: ICD-10-CM

## 2020-12-21 DIAGNOSIS — R06.02 SHORTNESS OF BREATH: Primary | ICD-10-CM

## 2020-12-21 PROCEDURE — 71046 X-RAY EXAM CHEST 2 VIEWS: CPT

## 2021-09-08 NOTE — DISCHARGE INSTRUCTIONS
Chest Pain: Care Instructions  Your Care Instructions    There are many things that can cause chest pain. Some are not serious and will get better on their own in a few days. But some kinds of chest pain need more testing and treatment. Your doctor may have recommended a follow-up visit in the next 8 to 12 hours. If you are not getting better, you may need more tests or treatment. Even though your doctor has released you, you still need to watch for any problems. The doctor carefully checked you, but sometimes problems can develop later. If you have new symptoms or if your symptoms do not get better, get medical care right away. If you have worse or different chest pain or pressure that lasts more than 5 minutes or you passed out (lost consciousness), call 911 or seek other emergency help right away. A medical visit is only one step in your treatment. Even if you feel better, you still need to do what your doctor recommends, such as going to all suggested follow-up appointments and taking medicines exactly as directed. This will help you recover and help prevent future problems. How can you care for yourself at home? · Rest until you feel better. · Take your medicine exactly as prescribed. Call your doctor if you think you are having a problem with your medicine. · Do not drive after taking a prescription pain medicine. When should you call for help? Call 911 if:    · You passed out (lost consciousness).     · You have severe difficulty breathing.     · You have symptoms of a heart attack. These may include:  ? Chest pain or pressure, or a strange feeling in your chest.  ? Sweating. ? Shortness of breath. ? Nausea or vomiting. ? Pain, pressure, or a strange feeling in your back, neck, jaw, or upper belly or in one or both shoulders or arms. ? Lightheadedness or sudden weakness. ? A fast or irregular heartbeat.   After you call 911, the  may tell you to chew 1 adult-strength or 2 to 4 low-dose aspirin. Wait for an ambulance. Do not try to drive yourself.    Call your doctor today if:    · You have any trouble breathing.     · Your chest pain gets worse.     · You are dizzy or lightheaded, or you feel like you may faint.     · You are not getting better as expected.     · You are having new or different chest pain. Where can you learn more? Go to http://mamie-sandrita.info/. Enter A120 in the search box to learn more about \"Chest Pain: Care Instructions. \"  Current as of: November 20, 2017  Content Version: 11.8  © 9685-4361 [employer]. Care instructions adapted under license by [employer] (which disclaims liability or warranty for this information). If you have questions about a medical condition or this instruction, always ask your healthcare professional. [address] any warranty or liability for your use of this information. Musculoskeletal Chest Pain: Care Instructions  Your Care Instructions    Chest pain is not always a sign that something is wrong with your heart or that you have another serious problem. The doctor thinks your chest pain is caused by strained muscles or ligaments, inflamed chest cartilage, or another problem in your chest, rather than by your heart. You may need more tests to find the cause of your chest pain. Follow-up care is a key part of your treatment and safety. Be sure to make and go to all appointments, and call your doctor if you are having problems. It's also a good idea to know your test results and keep a list of the medicines you take. How can you care for yourself at home? · Take pain medicines exactly as directed. ? If the doctor gave you a prescription medicine for pain, take it as prescribed. ? If you are not taking a prescription pain medicine, ask your doctor if you can take an over-the-counter medicine. · Rest and protect the sore area.   · Stop, change, or take a break from any activity that may be causing your pain or soreness. · Put ice or a cold pack on the sore area for 10 to 20 minutes at a time. Try to do this every 1 to 2 hours for the next 3 days (when you are awake) or until the swelling goes down. Put a thin cloth between the ice and your skin. · After 2 or 3 days, apply a heating pad set on low or a warm cloth to the area that hurts. Some doctors suggest that you go back and forth between hot and cold. · Do not wrap or tape your ribs for support. This may cause you to take smaller breaths, which could increase your risk of lung problems. · Mentholated creams such as Bengay or Icy Hot may soothe sore muscles. Follow the instructions on the package. · Follow your doctor's instructions for exercising. · Gentle stretching and massage may help you get better faster. Stretch slowly to the point just before pain begins, and hold the stretch for at least 15 to 30 seconds. Do this 3 or 4 times a day. Stretch just after you have applied heat. · As your pain gets better, slowly return to your normal activities. Any increased pain may be a sign that you need to rest a while longer. When should you call for help? Call 911 anytime you think you may need emergency care. For example, call if:    · You have chest pain or pressure. This may occur with:  ? Sweating. ? Shortness of breath. ? Nausea or vomiting. ? Pain that spreads from the chest to the neck, jaw, or one or both shoulders or arms. ? Dizziness or lightheadedness. ? A fast or uneven pulse. After calling 911, chew 1 adult-strength aspirin. Wait for an ambulance.  Do not try to drive yourself.     · You have sudden chest pain and shortness of breath, or you cough up blood.    Call your doctor now or seek immediate medical care if:    · You have any trouble breathing.     · Your chest pain gets worse.     · Your chest pain occurs consistently with exercise and is relieved by rest.    Watch closely for changes in Patient your health, and be sure to contact your doctor if:    · Your chest pain does not get better after 1 week. Where can you learn more? Go to http://mamie-sandrita.info/. Enter V293 in the search box to learn more about \"Musculoskeletal Chest Pain: Care Instructions. \"  Current as of: November 20, 2017  Content Version: 11.8  © 2774-2283 [employer]. Care instructions adapted under license by [employer] (which disclaims liability or warranty for this information). If you have questions about a medical condition or this instruction, always ask your healthcare professional. [place] [address] any warranty or liability for your use of this information.

## 2022-02-10 ENCOUNTER — APPOINTMENT (OUTPATIENT)
Dept: CT IMAGING | Age: 32
End: 2022-02-10
Attending: EMERGENCY MEDICINE
Payer: COMMERCIAL

## 2022-02-10 ENCOUNTER — HOSPITAL ENCOUNTER (EMERGENCY)
Age: 32
Discharge: HOME | End: 2022-02-10
Attending: EMERGENCY MEDICINE
Payer: COMMERCIAL

## 2022-02-10 VITALS
TEMPERATURE: 97.8 F | WEIGHT: 206.57 LBS | HEIGHT: 62 IN | SYSTOLIC BLOOD PRESSURE: 110 MMHG | DIASTOLIC BLOOD PRESSURE: 75 MMHG | OXYGEN SATURATION: 97 % | RESPIRATION RATE: 20 BRPM | BODY MASS INDEX: 38.01 KG/M2 | HEART RATE: 86 BPM

## 2022-02-10 DIAGNOSIS — R10.13 ABDOMINAL PAIN, EPIGASTRIC: Primary | ICD-10-CM

## 2022-02-10 LAB
ALBUMIN SERPL-MCNC: 4.1 G/DL (ref 3.5–5)
ALBUMIN/GLOB SERPL: 1.1 {RATIO} (ref 1.1–2.2)
ALP SERPL-CCNC: 59 U/L (ref 45–117)
ALT SERPL-CCNC: 29 U/L (ref 12–78)
ANION GAP SERPL CALC-SCNC: 9 MMOL/L (ref 5–15)
APPEARANCE UR: ABNORMAL
AST SERPL-CCNC: 13 U/L (ref 15–37)
BACTERIA URNS QL MICRO: ABNORMAL /HPF
BASOPHILS # BLD: 0.1 K/UL (ref 0–0.1)
BASOPHILS NFR BLD: 0 % (ref 0–1)
BILIRUB SERPL-MCNC: 0.3 MG/DL (ref 0.2–1)
BILIRUB UR QL: NEGATIVE
BUN SERPL-MCNC: 14 MG/DL (ref 6–20)
BUN/CREAT SERPL: 15 (ref 12–20)
CALCIUM SERPL-MCNC: 9.5 MG/DL (ref 8.5–10.1)
CHLORIDE SERPL-SCNC: 103 MMOL/L (ref 97–108)
CO2 SERPL-SCNC: 27 MMOL/L (ref 21–32)
COLOR UR: ABNORMAL
CREAT SERPL-MCNC: 0.94 MG/DL (ref 0.55–1.02)
DIFFERENTIAL METHOD BLD: ABNORMAL
EOSINOPHIL # BLD: 0.1 K/UL (ref 0–0.4)
EOSINOPHIL NFR BLD: 1 % (ref 0–7)
EPITH CASTS URNS QL MICRO: ABNORMAL /LPF
ERYTHROCYTE [DISTWIDTH] IN BLOOD BY AUTOMATED COUNT: 13.2 % (ref 11.5–14.5)
GLOBULIN SER CALC-MCNC: 3.6 G/DL (ref 2–4)
GLUCOSE SERPL-MCNC: 102 MG/DL (ref 65–100)
GLUCOSE UR STRIP.AUTO-MCNC: NEGATIVE MG/DL
HCG UR QL: NEGATIVE
HCT VFR BLD AUTO: 41.3 % (ref 35–47)
HGB BLD-MCNC: 13.8 G/DL (ref 11.5–16)
HGB UR QL STRIP: ABNORMAL
IMM GRANULOCYTES # BLD AUTO: 0 K/UL (ref 0–0.04)
IMM GRANULOCYTES NFR BLD AUTO: 0 % (ref 0–0.5)
KETONES UR QL STRIP.AUTO: NEGATIVE MG/DL
LEUKOCYTE ESTERASE UR QL STRIP.AUTO: ABNORMAL
LIPASE SERPL-CCNC: 81 U/L (ref 73–393)
LYMPHOCYTES # BLD: 3 K/UL (ref 0.8–3.5)
LYMPHOCYTES NFR BLD: 27 % (ref 12–49)
MCH RBC QN AUTO: 29.7 PG (ref 26–34)
MCHC RBC AUTO-ENTMCNC: 33.4 G/DL (ref 30–36.5)
MCV RBC AUTO: 88.8 FL (ref 80–99)
MONOCYTES # BLD: 0.9 K/UL (ref 0–1)
MONOCYTES NFR BLD: 8 % (ref 5–13)
NEUTS SEG # BLD: 7 K/UL (ref 1.8–8)
NEUTS SEG NFR BLD: 63 % (ref 32–75)
NITRITE UR QL STRIP.AUTO: NEGATIVE
NRBC # BLD: 0 K/UL (ref 0–0.01)
NRBC BLD-RTO: 0 PER 100 WBC
PH UR STRIP: 6 [PH] (ref 5–8)
PLATELET # BLD AUTO: 354 K/UL (ref 150–400)
PMV BLD AUTO: 9.6 FL (ref 8.9–12.9)
POTASSIUM SERPL-SCNC: 4 MMOL/L (ref 3.5–5.1)
PROT SERPL-MCNC: 7.7 G/DL (ref 6.4–8.2)
PROT UR STRIP-MCNC: ABNORMAL MG/DL
RBC # BLD AUTO: 4.65 M/UL (ref 3.8–5.2)
RBC #/AREA URNS HPF: ABNORMAL /HPF (ref 0–5)
SODIUM SERPL-SCNC: 139 MMOL/L (ref 136–145)
SP GR UR REFRACTOMETRY: 1.03 (ref 1–1.03)
UR CULT HOLD, URHOLD: NORMAL
UROBILINOGEN UR QL STRIP.AUTO: 0.2 EU/DL (ref 0.2–1)
WBC # BLD AUTO: 11.2 K/UL (ref 3.6–11)
WBC URNS QL MICRO: ABNORMAL /HPF (ref 0–4)

## 2022-02-10 PROCEDURE — 83690 ASSAY OF LIPASE: CPT

## 2022-02-10 PROCEDURE — 96374 THER/PROPH/DIAG INJ IV PUSH: CPT

## 2022-02-10 PROCEDURE — 99284 EMERGENCY DEPT VISIT MOD MDM: CPT

## 2022-02-10 PROCEDURE — 96375 TX/PRO/DX INJ NEW DRUG ADDON: CPT

## 2022-02-10 PROCEDURE — 81025 URINE PREGNANCY TEST: CPT

## 2022-02-10 PROCEDURE — 74177 CT ABD & PELVIS W/CONTRAST: CPT

## 2022-02-10 PROCEDURE — 80053 COMPREHEN METABOLIC PANEL: CPT

## 2022-02-10 PROCEDURE — 85025 COMPLETE CBC W/AUTO DIFF WBC: CPT

## 2022-02-10 PROCEDURE — 74011250636 HC RX REV CODE- 250/636: Performed by: EMERGENCY MEDICINE

## 2022-02-10 PROCEDURE — 74011000636 HC RX REV CODE- 636: Performed by: EMERGENCY MEDICINE

## 2022-02-10 PROCEDURE — 36415 COLL VENOUS BLD VENIPUNCTURE: CPT

## 2022-02-10 PROCEDURE — 81001 URINALYSIS AUTO W/SCOPE: CPT

## 2022-02-10 RX ORDER — KETOROLAC TROMETHAMINE 30 MG/ML
30 INJECTION, SOLUTION INTRAMUSCULAR; INTRAVENOUS
Status: COMPLETED | OUTPATIENT
Start: 2022-02-10 | End: 2022-02-10

## 2022-02-10 RX ORDER — SODIUM CHLORIDE 0.9 % (FLUSH) 0.9 %
5-40 SYRINGE (ML) INJECTION EVERY 8 HOURS
Status: DISCONTINUED | OUTPATIENT
Start: 2022-02-10 | End: 2022-02-10 | Stop reason: HOSPADM

## 2022-02-10 RX ORDER — ONDANSETRON 2 MG/ML
4 INJECTION INTRAMUSCULAR; INTRAVENOUS
Status: COMPLETED | OUTPATIENT
Start: 2022-02-10 | End: 2022-02-10

## 2022-02-10 RX ORDER — POLYETHYLENE GLYCOL 3350 17 G/17G
17 POWDER, FOR SOLUTION ORAL DAILY
Qty: 289 G | Refills: 0 | Status: SHIPPED | OUTPATIENT
Start: 2022-02-10

## 2022-02-10 RX ORDER — SODIUM CHLORIDE 0.9 % (FLUSH) 0.9 %
5-40 SYRINGE (ML) INJECTION AS NEEDED
Status: DISCONTINUED | OUTPATIENT
Start: 2022-02-10 | End: 2022-02-10 | Stop reason: HOSPADM

## 2022-02-10 RX ADMIN — SODIUM CHLORIDE 1000 ML: 9 INJECTION, SOLUTION INTRAVENOUS at 03:08

## 2022-02-10 RX ADMIN — ONDANSETRON 4 MG: 2 INJECTION INTRAMUSCULAR; INTRAVENOUS at 03:08

## 2022-02-10 RX ADMIN — IOPAMIDOL 100 ML: 755 INJECTION, SOLUTION INTRAVENOUS at 03:31

## 2022-02-10 RX ADMIN — KETOROLAC TROMETHAMINE 30 MG: 30 INJECTION, SOLUTION INTRAMUSCULAR at 03:08

## 2022-02-10 NOTE — ED TRIAGE NOTES
Hx COVID one month ago  Pt c/o upper abd pain/epigastric pain since approx 2200 last night, c/o \"sulfur burps\" (-)nausea (+)one episode of vomiting, states \"I think I threw up from the pain\" (-)diarrhea (+)constipation (-)dysuria (-)hematuria (-)back pain

## 2022-02-10 NOTE — ED NOTES
NS bolus completed. The patient was discharged home in stable condition, accompanied by self. The patient is alert and oriented, is in no respiratory distress and has vital signs within normal limits. The patient's diagnosis, condition and treatment were explained to patient. The patient expressed understanding. New prescriptions given to pt. No work/school note given to pt. A discharge plan has been developed. A  was not involved in the process. Aftercare instructions were given to the patient. IV catheter discontinued intact. Site without signs and symptoms of complications. Dressing and pressure applied.

## 2022-03-19 PROBLEM — F32.A ANXIETY AND DEPRESSION: Status: ACTIVE | Noted: 2018-07-05

## 2022-03-19 PROBLEM — Z86.711 HISTORY OF PULMONARY EMBOLISM: Status: ACTIVE | Noted: 2018-07-03

## 2022-03-19 PROBLEM — F41.9 ANXIETY AND DEPRESSION: Status: ACTIVE | Noted: 2018-07-05

## 2023-01-21 ENCOUNTER — HOSPITAL ENCOUNTER (EMERGENCY)
Age: 33
Discharge: HOME | End: 2023-01-21
Attending: STUDENT IN AN ORGANIZED HEALTH CARE EDUCATION/TRAINING PROGRAM
Payer: MEDICAID

## 2023-01-21 VITALS
RESPIRATION RATE: 16 BRPM | BODY MASS INDEX: 33.86 KG/M2 | TEMPERATURE: 98.1 F | DIASTOLIC BLOOD PRESSURE: 77 MMHG | WEIGHT: 184 LBS | OXYGEN SATURATION: 98 % | HEART RATE: 79 BPM | SYSTOLIC BLOOD PRESSURE: 111 MMHG | HEIGHT: 62 IN

## 2023-01-21 DIAGNOSIS — J02.0 ACUTE STREPTOCOCCAL PHARYNGITIS: Primary | ICD-10-CM

## 2023-01-21 LAB
DEPRECATED S PYO AG THROAT QL EIA: POSITIVE
SARS-COV-2, COV2: NORMAL

## 2023-01-21 PROCEDURE — U0005 INFEC AGEN DETEC AMPLI PROBE: HCPCS

## 2023-01-21 PROCEDURE — 87880 STREP A ASSAY W/OPTIC: CPT

## 2023-01-21 PROCEDURE — 99283 EMERGENCY DEPT VISIT LOW MDM: CPT

## 2023-01-21 RX ORDER — AZITHROMYCIN 500 MG/1
500 TABLET, FILM COATED ORAL DAILY
Qty: 5 TABLET | Refills: 0 | Status: SHIPPED | OUTPATIENT
Start: 2023-01-21 | End: 2023-01-26

## 2023-01-22 NOTE — ED NOTES
The patient was discharged home by Dr Aziza Fraser and Sharad Moffett RN in stable condition . The patient is alert and oriented, is in no respiratory distress and has vital signs within normal limits . The patient's diagnosis, condition and treatment were explained to patient or parent/guardian. The patient/responsible party expressed understanding. Prescription sent to pharmacy on file. No work/school note given to pt. A discharge plan has been developed. A  was not involved in the process. Aftercare instructions were given to the patient.

## 2023-01-22 NOTE — ED PROVIDER NOTES
Patient is a 29-year-old female present emergency department for sore throat, body aches, fevers. Patient states that she started with symptoms yesterday has been taking Tylenol and Motrin for symptoms. Patient's denying any difficulty swallowing states it is painful denies any changes in her voice also denies any neck rigidity. Denies chest pain, shortness of breath.        Past Medical History:   Diagnosis Date    Anxiety     Calculus of kidney     Depression     Dissociative disorder     Environmental allergies     GERD (gastroesophageal reflux disease)     Hashimoto's thyroiditis 10/24/2011    Hashimoto's thyroiditis 10/24/2011    History of pulmonary embolus (PE) 2018    Hypotension     Hypothyroid 2011    Hypothyroidism     MDD (major depressive disorder)     OCD (obsessive compulsive disorder)     Psychiatric disorder     depression --2 yr old daughter  2013    PTSD (post-traumatic stress disorder)     Unspecified adverse effect of anesthesia     per patient with epidural had severe N&V and passed out       Past Surgical History:   Procedure Laterality Date    DELIVERY   11    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, congenital anomaly    HX  SECTION  2016         Family History:   Problem Relation Age of Onset    Heart Disease Mother         miltral value prolapse    Hypertension Father     Thyroid Disease Maternal Grandmother     Diabetes Paternal Grandmother     Diabetes Maternal Aunt     Cancer Paternal Grandfather         throat/stomach cancer,  at age 72    Breast Cancer Other         maternal great grandmother       Social History     Socioeconomic History    Marital status: LEGALLY      Spouse name: Not on file    Number of children: Not on file    Years of education: Not on file    Highest education level: Not on file   Occupational History    Not on file   Tobacco Use    Smoking status: Former     Packs/day: 0.10     Years: 1.00     Pack years: 0.10 Types: Cigarettes     Quit date: 2018     Years since quittin.6    Smokeless tobacco: Never   Substance and Sexual Activity    Alcohol use: Yes     Comment: social    Drug use: No    Sexual activity: Yes     Partners: Male     Birth control/protection: Condom   Other Topics Concern    Not on file   Social History Narrative    Not on file     Social Determinants of Health     Financial Resource Strain: Not on file   Food Insecurity: Not on file   Transportation Needs: Not on file   Physical Activity: Not on file   Stress: Not on file   Social Connections: Not on file   Intimate Partner Violence: Not on file   Housing Stability: Not on file         ALLERGIES: Ancef [cefazolin], Pcn [penicillins], Peanut, Chocolate [cocoa], Citrus and derivatives, Clindamycin, [place], Beef containing products, Chicken derived, Corn, Egg, Milk, Milk prot-turm-pepper-pineappl, Shellfish derived, Soy, and [place]    Review of Systems   Constitutional:  Positive for fever. HENT:  Positive for sore throat. Musculoskeletal:  Positive for myalgias. All other systems reviewed and are negative. Vitals:    23 2230   BP: 111/77   Pulse: 79   Resp: 16   Temp: 98.1 °F (36.7 °C)   SpO2: 98%   Weight: 83.5 kg (184 lb)   Height: 5' 2\" (1.575 m)            Physical Exam  Vitals and nursing note reviewed. Constitutional:       General: She is not in acute distress. Appearance: She is well-developed. She is not diaphoretic. HENT:      Head: Normocephalic and atraumatic. Nose: Nose normal.      Mouth/Throat:      Pharynx: Oropharyngeal exudate and posterior oropharyngeal erythema present. Tonsils: Tonsillar exudate present. 1+ on the right. 1+ on the left. Eyes:      General: No scleral icterus. Right eye: No discharge. Left eye: No discharge. Conjunctiva/sclera: Conjunctivae normal.      Pupils: Pupils are equal, round, and reactive to light. Neck:      Thyroid: No thyromegaly. Vascular: No JVD. Trachea: No tracheal deviation. Cardiovascular:      Rate and Rhythm: Normal rate and regular rhythm. Heart sounds: Normal heart sounds. No murmur heard. No friction rub. No gallop. Pulmonary:      Effort: Pulmonary effort is normal. No respiratory distress. Breath sounds: Normal breath sounds. No stridor. No wheezing or rales. Chest:      Chest wall: No tenderness. Abdominal:      General: Bowel sounds are normal. There is no distension. Palpations: There is no mass. Tenderness: There is no abdominal tenderness. There is no rebound. Musculoskeletal:         General: No tenderness. Normal range of motion. Cervical back: Normal range of motion and neck supple. Lymphadenopathy:      Cervical: No cervical adenopathy. Skin:     General: Skin is warm and dry. Coloration: Skin is not pale. Findings: No erythema or rash. Neurological:      General: No focal deficit present. Mental Status: She is alert and oriented to person, place, and time. Cranial Nerves: No cranial nerve deficit. Coordination: Coordination normal.   Psychiatric:         Behavior: Behavior normal.         Thought Content: Thought content normal.         Judgment: Judgment normal.        Medical Decision Making  Viral URI versus group A strep. 42-year-old female presenting with symptoms of fevers, sore throat, myalgias. Physical exam likely group A strep as she has tonsillar exudates erythematous. Group A strep rapid positive patient has allergies to penicillins we will start patient on azithromycin patient will be discharged. Amount and/or Complexity of Data Reviewed  Labs: ordered. Risk  Prescription drug management.            Procedures

## 2023-01-22 NOTE — ED TRIAGE NOTES
Pt reports sore throat, swollen lymph nodes, body aches starting yesterday. Reports fever yesterday but not today.

## 2023-06-28 ENCOUNTER — HOSPITAL ENCOUNTER (EMERGENCY)
Facility: HOSPITAL | Age: 33
Discharge: HOME | End: 2023-06-28
Attending: EMERGENCY MEDICINE
Payer: MEDICAID

## 2023-06-28 ENCOUNTER — APPOINTMENT (OUTPATIENT)
Facility: HOSPITAL | Age: 33
End: 2023-06-28
Payer: MEDICAID

## 2023-06-28 VITALS
HEIGHT: 62 IN | HEART RATE: 88 BPM | DIASTOLIC BLOOD PRESSURE: 78 MMHG | OXYGEN SATURATION: 98 % | SYSTOLIC BLOOD PRESSURE: 106 MMHG | BODY MASS INDEX: 28.71 KG/M2 | RESPIRATION RATE: 16 BRPM | WEIGHT: 156 LBS | TEMPERATURE: 99 F

## 2023-06-28 DIAGNOSIS — S93.602A SPRAIN OF LEFT FOOT, INITIAL ENCOUNTER: Primary | ICD-10-CM

## 2023-06-28 PROCEDURE — 99283 EMERGENCY DEPT VISIT LOW MDM: CPT

## 2023-06-28 PROCEDURE — 6370000000 HC RX 637 (ALT 250 FOR IP): Performed by: EMERGENCY MEDICINE

## 2023-06-28 PROCEDURE — 73630 X-RAY EXAM OF FOOT: CPT

## 2023-06-28 RX ORDER — NAPROXEN 250 MG/1
500 TABLET ORAL ONCE
Status: COMPLETED | OUTPATIENT
Start: 2023-06-28 | End: 2023-06-28

## 2023-06-28 RX ADMIN — NAPROXEN 500 MG: 250 TABLET ORAL at 21:50

## 2023-06-28 ASSESSMENT — PAIN SCALES - GENERAL: PAINLEVEL_OUTOF10: 8

## 2023-06-28 ASSESSMENT — PAIN DESCRIPTION - DESCRIPTORS: DESCRIPTORS: ACHING

## 2023-06-28 ASSESSMENT — PAIN - FUNCTIONAL ASSESSMENT: PAIN_FUNCTIONAL_ASSESSMENT: 0-10

## 2023-06-28 ASSESSMENT — PAIN DESCRIPTION - LOCATION: LOCATION: FOOT

## 2023-06-28 ASSESSMENT — PAIN DESCRIPTION - ORIENTATION: ORIENTATION: LEFT

## 2023-08-26 SDOH — HEALTH STABILITY: PHYSICAL HEALTH: ON AVERAGE, HOW MANY MINUTES DO YOU ENGAGE IN EXERCISE AT THIS LEVEL?: 10 MIN

## 2023-08-26 SDOH — HEALTH STABILITY: PHYSICAL HEALTH: ON AVERAGE, HOW MANY DAYS PER WEEK DO YOU ENGAGE IN MODERATE TO STRENUOUS EXERCISE (LIKE A BRISK WALK)?: 1 DAY

## 2023-08-26 ASSESSMENT — SOCIAL DETERMINANTS OF HEALTH (SDOH)

## 2023-08-29 ENCOUNTER — OFFICE VISIT (OUTPATIENT)
Age: 33
End: 2023-08-29
Payer: MEDICAID

## 2023-08-29 ENCOUNTER — NURSE ONLY (OUTPATIENT)
Age: 33
End: 2023-08-29
Payer: MEDICAID

## 2023-08-29 VITALS
DIASTOLIC BLOOD PRESSURE: 76 MMHG | HEIGHT: 62 IN | OXYGEN SATURATION: 99 % | SYSTOLIC BLOOD PRESSURE: 110 MMHG | HEART RATE: 105 BPM | TEMPERATURE: 98.4 F | BODY MASS INDEX: 26.61 KG/M2 | WEIGHT: 144.6 LBS | RESPIRATION RATE: 16 BRPM

## 2023-08-29 DIAGNOSIS — E03.8 HYPOTHYROIDISM DUE TO HASHIMOTO'S THYROIDITIS: Primary | ICD-10-CM

## 2023-08-29 DIAGNOSIS — N92.6 IRREGULAR PERIODS/MENSTRUAL CYCLES: ICD-10-CM

## 2023-08-29 DIAGNOSIS — E03.8 HYPOTHYROIDISM DUE TO HASHIMOTO'S THYROIDITIS: ICD-10-CM

## 2023-08-29 DIAGNOSIS — E06.3 HYPOTHYROIDISM DUE TO HASHIMOTO'S THYROIDITIS: ICD-10-CM

## 2023-08-29 DIAGNOSIS — E06.3 HYPOTHYROIDISM DUE TO HASHIMOTO'S THYROIDITIS: Primary | ICD-10-CM

## 2023-08-29 PROCEDURE — 99204 OFFICE O/P NEW MOD 45 MIN: CPT | Performed by: FAMILY MEDICINE

## 2023-08-29 RX ORDER — LEVOTHYROXINE SODIUM 137 UG/1
137 TABLET ORAL DAILY
Qty: 30 TABLET | Refills: 0 | Status: SHIPPED | OUTPATIENT
Start: 2023-08-29

## 2023-08-29 RX ORDER — LEVOTHYROXINE SODIUM 137 UG/1
TABLET ORAL
COMMUNITY
Start: 2023-06-04 | End: 2023-08-29 | Stop reason: SDUPTHER

## 2023-08-29 SDOH — ECONOMIC STABILITY: FOOD INSECURITY: WITHIN THE PAST 12 MONTHS, YOU WORRIED THAT YOUR FOOD WOULD RUN OUT BEFORE YOU GOT MONEY TO BUY MORE.: NEVER TRUE

## 2023-08-29 SDOH — ECONOMIC STABILITY: INCOME INSECURITY: HOW HARD IS IT FOR YOU TO PAY FOR THE VERY BASICS LIKE FOOD, HOUSING, MEDICAL CARE, AND HEATING?: NOT HARD AT ALL

## 2023-08-29 SDOH — ECONOMIC STABILITY: HOUSING INSECURITY
IN THE LAST 12 MONTHS, WAS THERE A TIME WHEN YOU DID NOT HAVE A STEADY PLACE TO SLEEP OR SLEPT IN A SHELTER (INCLUDING NOW)?: NO

## 2023-08-29 SDOH — ECONOMIC STABILITY: FOOD INSECURITY: WITHIN THE PAST 12 MONTHS, THE FOOD YOU BOUGHT JUST DIDN'T LAST AND YOU DIDN'T HAVE MONEY TO GET MORE.: NEVER TRUE

## 2023-08-29 ASSESSMENT — PATIENT HEALTH QUESTIONNAIRE - PHQ9
7. TROUBLE CONCENTRATING ON THINGS, SUCH AS READING THE NEWSPAPER OR WATCHING TELEVISION: 1
SUM OF ALL RESPONSES TO PHQ9 QUESTIONS 1 & 2: 2
9. THOUGHTS THAT YOU WOULD BE BETTER OFF DEAD, OR OF HURTING YOURSELF: 0
4. FEELING TIRED OR HAVING LITTLE ENERGY: 1
SUM OF ALL RESPONSES TO PHQ QUESTIONS 1-9: 12
10. IF YOU CHECKED OFF ANY PROBLEMS, HOW DIFFICULT HAVE THESE PROBLEMS MADE IT FOR YOU TO DO YOUR WORK, TAKE CARE OF THINGS AT HOME, OR GET ALONG WITH OTHER PEOPLE: 1
6. FEELING BAD ABOUT YOURSELF - OR THAT YOU ARE A FAILURE OR HAVE LET YOURSELF OR YOUR FAMILY DOWN: 3
SUM OF ALL RESPONSES TO PHQ QUESTIONS 1-9: 12
2. FEELING DOWN, DEPRESSED OR HOPELESS: 1
8. MOVING OR SPEAKING SO SLOWLY THAT OTHER PEOPLE COULD HAVE NOTICED. OR THE OPPOSITE, BEING SO FIGETY OR RESTLESS THAT YOU HAVE BEEN MOVING AROUND A LOT MORE THAN USUAL: 1
1. LITTLE INTEREST OR PLEASURE IN DOING THINGS: 1
SUM OF ALL RESPONSES TO PHQ QUESTIONS 1-9: 12
SUM OF ALL RESPONSES TO PHQ QUESTIONS 1-9: 12
5. POOR APPETITE OR OVEREATING: 3
3. TROUBLE FALLING OR STAYING ASLEEP: 1

## 2023-08-30 LAB
ALBUMIN SERPL-MCNC: 4.3 G/DL (ref 3.5–5)
ALBUMIN/GLOB SERPL: 1.3 (ref 1.1–2.2)
ALP SERPL-CCNC: 60 U/L (ref 45–117)
ALT SERPL-CCNC: 16 U/L (ref 12–78)
ANION GAP SERPL CALC-SCNC: 6 MMOL/L (ref 5–15)
AST SERPL-CCNC: 9 U/L (ref 15–37)
BASOPHILS # BLD: 0.1 K/UL (ref 0–0.1)
BASOPHILS NFR BLD: 1 % (ref 0–1)
BILIRUB SERPL-MCNC: 0.4 MG/DL (ref 0.2–1)
BUN SERPL-MCNC: 9 MG/DL (ref 6–20)
BUN/CREAT SERPL: 9 (ref 12–20)
CALCIUM SERPL-MCNC: 9.7 MG/DL (ref 8.5–10.1)
CHLORIDE SERPL-SCNC: 105 MMOL/L (ref 97–108)
CO2 SERPL-SCNC: 28 MMOL/L (ref 21–32)
CREAT SERPL-MCNC: 0.98 MG/DL (ref 0.55–1.02)
DIFFERENTIAL METHOD BLD: ABNORMAL
EOSINOPHIL # BLD: 0.1 K/UL (ref 0–0.4)
EOSINOPHIL NFR BLD: 1 % (ref 0–7)
ERYTHROCYTE [DISTWIDTH] IN BLOOD BY AUTOMATED COUNT: 12.3 % (ref 11.5–14.5)
GLOBULIN SER CALC-MCNC: 3.3 G/DL (ref 2–4)
GLUCOSE SERPL-MCNC: 94 MG/DL (ref 65–100)
HCT VFR BLD AUTO: 43.5 % (ref 35–47)
HGB BLD-MCNC: 13.9 G/DL (ref 11.5–16)
IMM GRANULOCYTES # BLD AUTO: 0 K/UL (ref 0–0.04)
IMM GRANULOCYTES NFR BLD AUTO: 0 % (ref 0–0.5)
LYMPHOCYTES # BLD: 1.5 K/UL (ref 0.8–3.5)
LYMPHOCYTES NFR BLD: 14 % (ref 12–49)
MCH RBC QN AUTO: 29.6 PG (ref 26–34)
MCHC RBC AUTO-ENTMCNC: 32 G/DL (ref 30–36.5)
MCV RBC AUTO: 92.8 FL (ref 80–99)
MONOCYTES # BLD: 0.6 K/UL (ref 0–1)
MONOCYTES NFR BLD: 6 % (ref 5–13)
NEUTS SEG # BLD: 8.1 K/UL (ref 1.8–8)
NEUTS SEG NFR BLD: 78 % (ref 32–75)
NRBC # BLD: 0 K/UL (ref 0–0.01)
NRBC BLD-RTO: 0 PER 100 WBC
PLATELET # BLD AUTO: 335 K/UL (ref 150–400)
PMV BLD AUTO: 10.7 FL (ref 8.9–12.9)
POTASSIUM SERPL-SCNC: 4.3 MMOL/L (ref 3.5–5.1)
PROT SERPL-MCNC: 7.6 G/DL (ref 6.4–8.2)
RBC # BLD AUTO: 4.69 M/UL (ref 3.8–5.2)
SODIUM SERPL-SCNC: 139 MMOL/L (ref 136–145)
WBC # BLD AUTO: 10.4 K/UL (ref 3.6–11)

## 2023-08-31 PROBLEM — N92.6 IRREGULAR PERIODS/MENSTRUAL CYCLES: Status: ACTIVE | Noted: 2023-08-31

## 2023-08-31 LAB
Lab: ABNORMAL
T4 FREE SERPL-MCNC: 1.26 NG/DL (ref 0.82–1.77)
THYROID PEROXIDASE ANTIBODY: 125 IU/ML (ref 0–34)
TSH SERPL DL<=0.05 MIU/L-ACNC: 6.39 UIU/ML (ref 0.45–4.5)

## 2023-08-31 RX ORDER — ALBUTEROL SULFATE 90 UG/1
2 AEROSOL, METERED RESPIRATORY (INHALATION) EVERY 4 HOURS PRN
Qty: 18 G | Refills: 1 | Status: SHIPPED | OUTPATIENT
Start: 2023-08-31

## 2023-08-31 NOTE — PROGRESS NOTES
Vaishali Patel (:  1990) is a 29 y.o. female,New patient, here for evaluation of the following chief complaint(s):  Establish Care (Patient is here to establish care she use to see Dr. Ronnie Reyes and Dr. Campuzano ), Labs Only (Patient would like to have her thyroid checked as well as being checked for anemia as she is cold all the time ), Medication Refill, and Menstrual Problem (Patient would like to discuss irregular menstrual cycle )         ASSESSMENT/PLAN:  1. Hypothyroidism due to Hashimoto's thyroiditis  -     Thyroid Cascade Profile; Future  -     levothyroxine (SYNTHROID) 137 MCG tablet; Take 1 tablet by mouth Daily, Disp-30 tablet, R-0Normal  2. Irregular periods/menstrual cycles  -     CBC with Auto Differential; Future  -     Comprehensive Metabolic Panel; Future      No follow-ups on file. Subjective   SUBJECTIVE/OBJECTIVE:  Patient presents to re-establish care, has a history of thyroid disturbance, is currently taking 137 mcg of levothyroxine. Has complaints of irregular periods. Has been under stress lately, with the death of the father of her children. Has low appetite, and pedro has been low. Denies SI, HI, AH or VH. Review of Systems   All other systems reviewed and are negative. Objective   Physical Exam  Constitutional:       Appearance: Normal appearance. She is normal weight. HENT:      Head: Normocephalic and atraumatic. Right Ear: External ear normal.      Left Ear: External ear normal.      Nose: Nose normal.      Mouth/Throat:      Mouth: Mucous membranes are moist.      Pharynx: Oropharynx is clear. Eyes:      Extraocular Movements: Extraocular movements intact. Conjunctiva/sclera: Conjunctivae normal.      Pupils: Pupils are equal, round, and reactive to light. Cardiovascular:      Rate and Rhythm: Normal rate. Pulses: Normal pulses. Pulmonary:      Effort: Pulmonary effort is normal.   Abdominal:      General: Abdomen is flat.

## 2023-09-06 ENCOUNTER — OFFICE VISIT (OUTPATIENT)
Age: 33
End: 2023-09-06
Payer: MEDICAID

## 2023-09-06 VITALS
BODY MASS INDEX: 26.72 KG/M2 | RESPIRATION RATE: 16 BRPM | HEIGHT: 62 IN | WEIGHT: 145.2 LBS | OXYGEN SATURATION: 97 % | HEART RATE: 83 BPM | SYSTOLIC BLOOD PRESSURE: 109 MMHG | TEMPERATURE: 98 F | DIASTOLIC BLOOD PRESSURE: 70 MMHG

## 2023-09-06 DIAGNOSIS — N92.6 IRREGULAR MENSTRUAL BLEEDING: ICD-10-CM

## 2023-09-06 DIAGNOSIS — F41.9 ANXIETY: ICD-10-CM

## 2023-09-06 DIAGNOSIS — E03.9 ACQUIRED HYPOTHYROIDISM: Primary | ICD-10-CM

## 2023-09-06 DIAGNOSIS — R30.0 DYSURIA: ICD-10-CM

## 2023-09-06 LAB
BILIRUBIN, URINE, POC: NORMAL
BLOOD URINE, POC: NORMAL
GLUCOSE URINE, POC: NEGATIVE
KETONES, URINE, POC: NORMAL
LEUKOCYTE ESTERASE, URINE, POC: NORMAL
NITRITE, URINE, POC: POSITIVE
PH, URINE, POC: 6 (ref 4.6–8)
PROTEIN,URINE, POC: NORMAL
SPECIFIC GRAVITY, URINE, POC: 1.02 (ref 1–1.03)
URINALYSIS CLARITY, POC: NORMAL
URINALYSIS COLOR, POC: NORMAL
UROBILINOGEN, POC: NORMAL

## 2023-09-06 PROCEDURE — 81003 URINALYSIS AUTO W/O SCOPE: CPT | Performed by: FAMILY MEDICINE

## 2023-09-06 PROCEDURE — 99214 OFFICE O/P EST MOD 30 MIN: CPT | Performed by: FAMILY MEDICINE

## 2023-09-06 PROCEDURE — PBSHW AMB POC URINALYSIS DIP STICK AUTO W/O MICRO: Performed by: FAMILY MEDICINE

## 2023-09-06 RX ORDER — NITROFURANTOIN 25; 75 MG/1; MG/1
100 CAPSULE ORAL 2 TIMES DAILY
Qty: 10 CAPSULE | Refills: 0 | Status: SHIPPED | OUTPATIENT
Start: 2023-09-06 | End: 2023-09-11

## 2023-09-06 ASSESSMENT — ENCOUNTER SYMPTOMS
RESPIRATORY NEGATIVE: 1
GASTROINTESTINAL NEGATIVE: 1
EYES NEGATIVE: 1
ALLERGIC/IMMUNOLOGIC NEGATIVE: 1

## 2023-09-07 LAB
APPEARANCE UR: ABNORMAL
BACTERIA URNS QL MICRO: ABNORMAL /HPF
BILIRUB UR QL: NEGATIVE
COLOR UR: ABNORMAL
EPITH CASTS URNS QL MICRO: ABNORMAL /LPF
GLUCOSE UR STRIP.AUTO-MCNC: NEGATIVE MG/DL
HGB UR QL STRIP: NEGATIVE
KETONES UR QL STRIP.AUTO: NEGATIVE MG/DL
LEUKOCYTE ESTERASE UR QL STRIP.AUTO: ABNORMAL
NITRITE UR QL STRIP.AUTO: POSITIVE
PH UR STRIP: 6 (ref 5–8)
PROT UR STRIP-MCNC: NEGATIVE MG/DL
RBC #/AREA URNS HPF: ABNORMAL /HPF (ref 0–5)
SP GR UR REFRACTOMETRY: 1.02 (ref 1–1.03)
URINE CULTURE IF INDICATED: ABNORMAL
UROBILINOGEN UR QL STRIP.AUTO: 0.2 EU/DL (ref 0.2–1)
WBC URNS QL MICRO: ABNORMAL /HPF (ref 0–4)

## 2023-09-20 ENCOUNTER — NURSE ONLY (OUTPATIENT)
Age: 33
End: 2023-09-20
Payer: MEDICAID

## 2023-09-20 ENCOUNTER — OFFICE VISIT (OUTPATIENT)
Age: 33
End: 2023-09-20
Payer: MEDICAID

## 2023-09-20 VITALS
DIASTOLIC BLOOD PRESSURE: 62 MMHG | SYSTOLIC BLOOD PRESSURE: 112 MMHG | OXYGEN SATURATION: 98 % | RESPIRATION RATE: 16 BRPM | HEART RATE: 91 BPM | HEIGHT: 62 IN | TEMPERATURE: 98.6 F | BODY MASS INDEX: 26.5 KG/M2 | WEIGHT: 144 LBS

## 2023-09-20 DIAGNOSIS — N20.0 RENAL CALCULI: ICD-10-CM

## 2023-09-20 DIAGNOSIS — R30.0 DYSURIA: ICD-10-CM

## 2023-09-20 DIAGNOSIS — R63.4 WEIGHT LOSS: ICD-10-CM

## 2023-09-20 DIAGNOSIS — N20.0 RENAL CALCULI: Primary | ICD-10-CM

## 2023-09-20 PROCEDURE — 99212 OFFICE O/P EST SF 10 MIN: CPT | Performed by: FAMILY MEDICINE

## 2023-09-21 LAB
APPEARANCE UR: CLEAR
BACTERIA URNS QL MICRO: ABNORMAL /HPF
BILIRUB UR QL: NEGATIVE
COLOR UR: ABNORMAL
EPITH CASTS URNS QL MICRO: ABNORMAL /LPF
GLUCOSE UR STRIP.AUTO-MCNC: NEGATIVE MG/DL
HGB UR QL STRIP: NEGATIVE
KETONES UR QL STRIP.AUTO: ABNORMAL MG/DL
LEUKOCYTE ESTERASE UR QL STRIP.AUTO: NEGATIVE
NITRITE UR QL STRIP.AUTO: POSITIVE
PH UR STRIP: 6 (ref 5–8)
PROT UR STRIP-MCNC: NEGATIVE MG/DL
RBC #/AREA URNS HPF: ABNORMAL /HPF (ref 0–5)
SP GR UR REFRACTOMETRY: 1.02 (ref 1–1.03)
URINE CULTURE IF INDICATED: ABNORMAL
UROBILINOGEN UR QL STRIP.AUTO: 1 EU/DL (ref 0.2–1)
WBC URNS QL MICRO: ABNORMAL /HPF (ref 0–4)

## 2023-09-21 ASSESSMENT — ENCOUNTER SYMPTOMS
EYES NEGATIVE: 1
ALLERGIC/IMMUNOLOGIC NEGATIVE: 1
GASTROINTESTINAL NEGATIVE: 1
RESPIRATORY NEGATIVE: 1

## 2023-09-23 LAB
C TRACH RRNA SPEC QL NAA+PROBE: NEGATIVE
N GONORRHOEA RRNA SPEC QL NAA+PROBE: NEGATIVE
SPECIMEN SOURCE: NORMAL
T VAGINALIS RRNA SPEC QL NAA+PROBE: NEGATIVE

## 2023-09-26 ENCOUNTER — TELEPHONE (OUTPATIENT)
Age: 33
End: 2023-09-26

## 2023-09-26 NOTE — TELEPHONE ENCOUNTER
Pt called in regards to her urinalysis ; she would like an antibiotic sent to [facility].

## 2023-09-28 ENCOUNTER — TELEPHONE (OUTPATIENT)
Age: 33
End: 2023-09-28

## 2023-09-28 NOTE — TELEPHONE ENCOUNTER
----- Message from Bianka Venegas MD sent at 9/27/2023  9:14 AM EDT -----  Hello, there is no indication for treatment, patient has to practice good hygeine skills, such as wiping front to back, vs back to front.

## 2023-09-30 DIAGNOSIS — E06.3 HYPOTHYROIDISM DUE TO HASHIMOTO'S THYROIDITIS: ICD-10-CM

## 2023-09-30 DIAGNOSIS — E03.8 HYPOTHYROIDISM DUE TO HASHIMOTO'S THYROIDITIS: ICD-10-CM

## 2023-10-02 RX ORDER — LEVOTHYROXINE SODIUM 137 UG/1
137 TABLET ORAL DAILY
Qty: 30 TABLET | Refills: 4 | Status: SHIPPED | OUTPATIENT
Start: 2023-10-02

## 2023-10-09 ENCOUNTER — PATIENT MESSAGE (OUTPATIENT)
Age: 33
End: 2023-10-09
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
## 2024-01-17 ENCOUNTER — OFFICE VISIT (OUTPATIENT)
Age: 34
End: 2024-01-17
Payer: MEDICAID

## 2024-01-17 ENCOUNTER — NURSE ONLY (OUTPATIENT)
Age: 34
End: 2024-01-17
Payer: MEDICAID

## 2024-01-17 VITALS
WEIGHT: 143 LBS | OXYGEN SATURATION: 97 % | HEART RATE: 90 BPM | TEMPERATURE: 97.8 F | RESPIRATION RATE: 16 BRPM | SYSTOLIC BLOOD PRESSURE: 118 MMHG | DIASTOLIC BLOOD PRESSURE: 67 MMHG | BODY MASS INDEX: 26.31 KG/M2 | HEIGHT: 62 IN

## 2024-01-17 DIAGNOSIS — R53.83 MALAISE AND FATIGUE: ICD-10-CM

## 2024-01-17 DIAGNOSIS — E55.9 VITAMIN D DEFICIENCY: ICD-10-CM

## 2024-01-17 DIAGNOSIS — R63.4 WEIGHT LOSS: Primary | ICD-10-CM

## 2024-01-17 DIAGNOSIS — R73.09 ELEVATED GLUCOSE: ICD-10-CM

## 2024-01-17 DIAGNOSIS — R53.81 MALAISE AND FATIGUE: ICD-10-CM

## 2024-01-17 DIAGNOSIS — R63.4 WEIGHT LOSS: ICD-10-CM

## 2024-01-17 DIAGNOSIS — R82.90 ABNORMAL URINE ODOR: ICD-10-CM

## 2024-01-17 LAB
BILIRUBIN, URINE, POC: NEGATIVE
BLOOD URINE, POC: NORMAL
GLUCOSE URINE, POC: NEGATIVE
GLUCOSE, POC: 113 MG/DL
HBA1C MFR BLD: 4.6 %
KETONES, URINE, POC: NEGATIVE
LEUKOCYTE ESTERASE, URINE, POC: NEGATIVE
NITRITE, URINE, POC: NEGATIVE
PH, URINE, POC: 6 (ref 4.6–8)
PROTEIN,URINE, POC: NEGATIVE
SPECIFIC GRAVITY, URINE, POC: 1 (ref 1–1.03)
URINALYSIS CLARITY, POC: CLEAR
URINALYSIS COLOR, POC: YELLOW
UROBILINOGEN, POC: NORMAL

## 2024-01-17 PROCEDURE — PBSHW AMB POC GLUCOSE BLOOD, BY GLUCOSE MONITORING DEVICE: Performed by: INTERNAL MEDICINE

## 2024-01-17 PROCEDURE — 82962 GLUCOSE BLOOD TEST: CPT | Performed by: INTERNAL MEDICINE

## 2024-01-17 PROCEDURE — 83036 HEMOGLOBIN GLYCOSYLATED A1C: CPT | Performed by: INTERNAL MEDICINE

## 2024-01-17 PROCEDURE — 99213 OFFICE O/P EST LOW 20 MIN: CPT | Performed by: INTERNAL MEDICINE

## 2024-01-17 PROCEDURE — PBSHW AMB POC HEMOGLOBIN A1C: Performed by: INTERNAL MEDICINE

## 2024-01-17 PROCEDURE — 81003 URINALYSIS AUTO W/O SCOPE: CPT | Performed by: INTERNAL MEDICINE

## 2024-01-17 PROCEDURE — PBSHW AMB POC URINALYSIS DIP STICK AUTO W/O MICRO: Performed by: INTERNAL MEDICINE

## 2024-01-17 RX ORDER — FLUOXETINE HYDROCHLORIDE 40 MG/1
40 CAPSULE ORAL DAILY
COMMUNITY
Start: 2023-12-29

## 2024-01-17 RX ORDER — TRAZODONE HYDROCHLORIDE 50 MG/1
TABLET ORAL
COMMUNITY
Start: 2023-10-23

## 2024-01-17 ASSESSMENT — PATIENT HEALTH QUESTIONNAIRE - PHQ9
SUM OF ALL RESPONSES TO PHQ QUESTIONS 1-9: 9
1. LITTLE INTEREST OR PLEASURE IN DOING THINGS: 1
3. TROUBLE FALLING OR STAYING ASLEEP: 1
4. FEELING TIRED OR HAVING LITTLE ENERGY: 3
SUM OF ALL RESPONSES TO PHQ QUESTIONS 1-9: 9
5. POOR APPETITE OR OVEREATING: 1
10. IF YOU CHECKED OFF ANY PROBLEMS, HOW DIFFICULT HAVE THESE PROBLEMS MADE IT FOR YOU TO DO YOUR WORK, TAKE CARE OF THINGS AT HOME, OR GET ALONG WITH OTHER PEOPLE: 1
SUM OF ALL RESPONSES TO PHQ QUESTIONS 1-9: 9
2. FEELING DOWN, DEPRESSED OR HOPELESS: 1
9. THOUGHTS THAT YOU WOULD BE BETTER OFF DEAD, OR OF HURTING YOURSELF: 0
SUM OF ALL RESPONSES TO PHQ9 QUESTIONS 1 & 2: 2
SUM OF ALL RESPONSES TO PHQ QUESTIONS 1-9: 9
7. TROUBLE CONCENTRATING ON THINGS, SUCH AS READING THE NEWSPAPER OR WATCHING TELEVISION: 1
6. FEELING BAD ABOUT YOURSELF - OR THAT YOU ARE A FAILURE OR HAVE LET YOURSELF OR YOUR FAMILY DOWN: 1
8. MOVING OR SPEAKING SO SLOWLY THAT OTHER PEOPLE COULD HAVE NOTICED. OR THE OPPOSITE, BEING SO FIGETY OR RESTLESS THAT YOU HAVE BEEN MOVING AROUND A LOT MORE THAN USUAL: 0

## 2024-01-17 ASSESSMENT — ENCOUNTER SYMPTOMS
RESPIRATORY NEGATIVE: 1
ALLERGIC/IMMUNOLOGIC NEGATIVE: 1
GASTROINTESTINAL NEGATIVE: 1
EYES NEGATIVE: 1

## 2024-01-17 NOTE — PROGRESS NOTES
Chief Complaint   Patient presents with    Weight Loss     Patient is concerned about weight loss within the past year, is constantly fatigued and stays very cold     Discuss Labs     Patient stated her TSH was high the last time and has concerns about diabetes as her urine has a sweet smell and she is staying thirsty constantly      Assessment/ Plan:   Nicole was seen today for weight loss and discuss labs.    Diagnoses and all orders for this visit:    Weight loss  -     AMB POC GLUCOSE BLOOD, BY GLUCOSE MONITORING DEVICE  -     AMB POC HEMOGLOBIN A1C  -     AMB POC URINALYSIS DIP STICK AUTO W/O MICRO  -     Basic Metabolic Panel; Future    Abnormal urine odor  -     AMB POC GLUCOSE BLOOD, BY GLUCOSE MONITORING DEVICE  -     AMB POC HEMOGLOBIN A1C  -     AMB POC URINALYSIS DIP STICK AUTO W/O MICRO    Malaise and fatigue  -     Basic Metabolic Panel; Future  -     T4, Free; Future  -     TSH; Future    Elevated glucose  -     Hemoglobin A1C; Future    Vitamin D deficiency  -     Vitamin D 25 Hydroxy; Future    Patient does not appear to be a diabetic with normal HBa1C, Blood glucose and UA in the office.   We will send for other labs as outlined above. Will advise when they return. She murillo have a h/o hypothyroid disorder.     I have discussed the diagnosis with the patient and the intended treatment plan as seen in the above orders. The patient has received an after-visit summary and questions were answered concerning future plans. Asked to return should symptoms worsen or not improve with treatment. Any pending labs and studies will be relayed to patient when they become available.     Pt verbalizes understanding of plan of care and denies further questions or concerns at this time.     Follow Up:  Return if symptoms worsen or fail to improve.  Follow up with PCP - Dr. Khan.     Subjective:   Nicole Baum is a 33 y.o. female who presents today with the above complaints. Urinary frequency, acetone smell to 
have these problems made it for you to do your work, take care of things at home, or get along with other people? 1 1        Fall Risk Assessment:  :          No data to display                 Abuse Screening:  :          No data to display                 Coordination of Care Questionnaire:  :     \"Have you been to the ER, urgent care clinic since your last visit?  Hospitalized since your last visit?\"    NO    “Have you seen or consulted any other health care providers outside of [facility] since your last visit?”    NO

## 2024-01-17 NOTE — PATIENT INSTRUCTIONS
Based on the labs we did today, there is no evidence of diabetes.   We will send for other labs and go from there.     Results for orders placed or performed in visit on 01/17/24   AMB POC GLUCOSE BLOOD, BY GLUCOSE MONITORING DEVICE   Result Value Ref Range    Glucose,  MG/DL   AMB POC HEMOGLOBIN A1C   Result Value Ref Range    Hemoglobin A1C, POC 4.6 %   AMB POC URINALYSIS DIP STICK AUTO W/O MICRO   Result Value Ref Range    Color, Urine, POC Yellow     Clarity, Urine, POC Clear     Glucose, Urine, POC Negative     Bilirubin, Urine, POC Negative     Ketones, Urine, POC Negative     Specific Gravity, Urine, POC 1.005 1.001 - 1.035    Blood, Urine, POC Trace-intact     pH, Urine, POC 6.0 4.6 - 8.0    Protein, Urine, POC Negative     Urobilinogen, POC 0.2 mg/dL     Nitrite, Urine, POC Negative     Leukocyte Esterase, Urine, POC Negative

## 2024-01-18 LAB
25(OH)D3 SERPL-MCNC: 10.8 NG/ML (ref 30–100)
ANION GAP SERPL CALC-SCNC: 5 MMOL/L (ref 5–15)
BUN SERPL-MCNC: 8 MG/DL (ref 6–20)
BUN/CREAT SERPL: 9 (ref 12–20)
CALCIUM SERPL-MCNC: 9.5 MG/DL (ref 8.5–10.1)
CHLORIDE SERPL-SCNC: 105 MMOL/L (ref 97–108)
CO2 SERPL-SCNC: 27 MMOL/L (ref 21–32)
CREAT SERPL-MCNC: 0.86 MG/DL (ref 0.55–1.02)
EST. AVERAGE GLUCOSE BLD GHB EST-MCNC: 91 MG/DL
GLUCOSE SERPL-MCNC: 88 MG/DL (ref 65–100)
HBA1C MFR BLD: 4.8 % (ref 4–5.6)
POTASSIUM SERPL-SCNC: 4.4 MMOL/L (ref 3.5–5.1)
SODIUM SERPL-SCNC: 137 MMOL/L (ref 136–145)
T4 FREE SERPL-MCNC: 1.2 NG/DL (ref 0.8–1.5)
TSH SERPL DL<=0.05 MIU/L-ACNC: 0.43 UIU/ML (ref 0.36–3.74)

## 2024-01-19 ENCOUNTER — TELEPHONE (OUTPATIENT)
Age: 34
End: 2024-01-19

## 2024-01-19 DIAGNOSIS — E55.9 VITAMIN D DEFICIENCY: Primary | ICD-10-CM

## 2024-01-19 RX ORDER — ERGOCALCIFEROL 1.25 MG/1
50000 CAPSULE ORAL WEEKLY
Qty: 12 CAPSULE | Refills: 1 | Status: SHIPPED | OUTPATIENT
Start: 2024-01-19

## 2024-01-19 NOTE — TELEPHONE ENCOUNTER
----- Message from Whit Blanco MD sent at 1/19/2024  7:17 AM EST -----  Patient of Dr. Khan  Please let patient know that her labs were stable.   The only concern was a low vitamin D level.   She is very deficient. I recommend vitamin D 50,000 international units weekly x 12 weeks, then take 2000 international units daily.  Would recommend repeating vitamin D levels in 8-12 weeks. I will send the prescription to her pharmacy of record.   Otherwise, her thyroid is normal and the venous hemoglobin A1C confirms that she does not have diabetes.   Thanks!

## 2024-03-25 DIAGNOSIS — B00.1 RECURRENT COLD SORES: Primary | ICD-10-CM

## 2024-03-25 RX ORDER — VALACYCLOVIR HYDROCHLORIDE 1 G/1
2000 TABLET, FILM COATED ORAL 2 TIMES DAILY
Qty: 8 TABLET | Refills: 0 | Status: SHIPPED | OUTPATIENT
Start: 2024-03-25 | End: 2024-03-27

## 2024-04-19 DIAGNOSIS — E06.3 HYPOTHYROIDISM DUE TO HASHIMOTO'S THYROIDITIS: ICD-10-CM

## 2024-04-19 DIAGNOSIS — E03.8 HYPOTHYROIDISM DUE TO HASHIMOTO'S THYROIDITIS: ICD-10-CM

## 2024-04-19 RX ORDER — LEVOTHYROXINE SODIUM 137 UG/1
137 TABLET ORAL DAILY
Qty: 30 TABLET | Refills: 0 | Status: SHIPPED | OUTPATIENT
Start: 2024-04-19

## 2024-06-17 DIAGNOSIS — E03.8 HYPOTHYROIDISM DUE TO HASHIMOTO'S THYROIDITIS: ICD-10-CM

## 2024-06-17 DIAGNOSIS — E06.3 HYPOTHYROIDISM DUE TO HASHIMOTO'S THYROIDITIS: ICD-10-CM

## 2024-06-18 DIAGNOSIS — E03.8 HYPOTHYROIDISM DUE TO HASHIMOTO'S THYROIDITIS: ICD-10-CM

## 2024-06-18 DIAGNOSIS — E06.3 HYPOTHYROIDISM DUE TO HASHIMOTO'S THYROIDITIS: ICD-10-CM

## 2024-06-20 RX ORDER — LEVOTHYROXINE SODIUM 137 UG/1
137 TABLET ORAL DAILY
Qty: 30 TABLET | Refills: 0 | Status: SHIPPED | OUTPATIENT
Start: 2024-06-20

## 2024-06-24 RX ORDER — LEVOTHYROXINE SODIUM 137 UG/1
137 TABLET ORAL DAILY
Qty: 30 TABLET | Refills: 0 | Status: SHIPPED | OUTPATIENT
Start: 2024-06-24

## 2024-09-16 DIAGNOSIS — E06.3 HYPOTHYROIDISM DUE TO HASHIMOTO'S THYROIDITIS: ICD-10-CM

## 2024-09-16 DIAGNOSIS — E03.8 HYPOTHYROIDISM DUE TO HASHIMOTO'S THYROIDITIS: ICD-10-CM

## 2024-09-16 RX ORDER — LEVOTHYROXINE SODIUM 137 UG/1
137 TABLET ORAL DAILY
Qty: 30 TABLET | Refills: 0 | Status: SHIPPED | OUTPATIENT
Start: 2024-09-16

## 2024-12-11 ENCOUNTER — APPOINTMENT (OUTPATIENT)
Facility: HOSPITAL | Age: 34
End: 2024-12-11
Payer: MEDICAID

## 2024-12-11 ENCOUNTER — HOSPITAL ENCOUNTER (EMERGENCY)
Facility: HOSPITAL | Age: 34
Discharge: HOME | End: 2024-12-11
Attending: EMERGENCY MEDICINE
Payer: MEDICAID

## 2024-12-11 VITALS
DIASTOLIC BLOOD PRESSURE: 75 MMHG | WEIGHT: 202.6 LBS | HEIGHT: 62 IN | BODY MASS INDEX: 37.28 KG/M2 | OXYGEN SATURATION: 97 % | SYSTOLIC BLOOD PRESSURE: 116 MMHG | RESPIRATION RATE: 20 BRPM | TEMPERATURE: 98.6 F | HEART RATE: 93 BPM

## 2024-12-11 DIAGNOSIS — J10.1 INFLUENZA A: Primary | ICD-10-CM

## 2024-12-11 DIAGNOSIS — B00.1 RECURRENT COLD SORES: ICD-10-CM

## 2024-12-11 LAB
FLUAV RNA SPEC QL NAA+PROBE: DETECTED
FLUBV RNA SPEC QL NAA+PROBE: NOT DETECTED
SARS-COV-2 RNA RESP QL NAA+PROBE: NOT DETECTED
SOURCE: ABNORMAL

## 2024-12-11 PROCEDURE — 71045 X-RAY EXAM CHEST 1 VIEW: CPT

## 2024-12-11 PROCEDURE — 99284 EMERGENCY DEPT VISIT MOD MDM: CPT

## 2024-12-11 PROCEDURE — 6370000000 HC RX 637 (ALT 250 FOR IP): Performed by: EMERGENCY MEDICINE

## 2024-12-11 PROCEDURE — 87636 SARSCOV2 & INF A&B AMP PRB: CPT

## 2024-12-11 RX ORDER — VALACYCLOVIR HYDROCHLORIDE 1 G/1
TABLET, FILM COATED ORAL
Qty: 8 TABLET | Refills: 0 | Status: SHIPPED | OUTPATIENT
Start: 2024-12-11

## 2024-12-11 RX ORDER — IBUPROFEN 600 MG/1
600 TABLET, FILM COATED ORAL
Status: COMPLETED | OUTPATIENT
Start: 2024-12-11 | End: 2024-12-11

## 2024-12-11 RX ORDER — LAMOTRIGINE 150 MG/1
150 TABLET ORAL DAILY
COMMUNITY

## 2024-12-11 RX ADMIN — IBUPROFEN 600 MG: 600 TABLET, FILM COATED ORAL at 08:58

## 2024-12-11 ASSESSMENT — ENCOUNTER SYMPTOMS
RHINORRHEA: 1
SORE THROAT: 1
EYES NEGATIVE: 1
SINUS PRESSURE: 1
COUGH: 1
GASTROINTESTINAL NEGATIVE: 1

## 2024-12-11 ASSESSMENT — PAIN SCALES - GENERAL
PAINLEVEL_OUTOF10: 3
PAINLEVEL_OUTOF10: 3

## 2024-12-11 ASSESSMENT — PAIN - FUNCTIONAL ASSESSMENT
PAIN_FUNCTIONAL_ASSESSMENT: ACTIVITIES ARE NOT PREVENTED
PAIN_FUNCTIONAL_ASSESSMENT: 0-10
PAIN_FUNCTIONAL_ASSESSMENT: ACTIVITIES ARE NOT PREVENTED

## 2024-12-11 ASSESSMENT — PAIN DESCRIPTION - ORIENTATION
ORIENTATION: MID
ORIENTATION: ANTERIOR

## 2024-12-11 ASSESSMENT — PAIN DESCRIPTION - DESCRIPTORS
DESCRIPTORS: CRAMPING
DESCRIPTORS: SORE

## 2024-12-11 ASSESSMENT — LIFESTYLE VARIABLES
HOW OFTEN DO YOU HAVE A DRINK CONTAINING ALCOHOL: NEVER
HOW MANY STANDARD DRINKS CONTAINING ALCOHOL DO YOU HAVE ON A TYPICAL DAY: PATIENT DOES NOT DRINK

## 2024-12-11 ASSESSMENT — PAIN DESCRIPTION - LOCATION
LOCATION: CHEST
LOCATION: CHEST

## 2024-12-11 NOTE — ED TRIAGE NOTES
Pt ambulated to treatment area with a steady gait. Pt started with sore throat yesterday and cough with SOB last night. Pt states her lungs \"itch\".

## 2024-12-11 NOTE — ED PROVIDER NOTES
[facility] EMERGENCY DEPT  EMERGENCY DEPARTMENT ENCOUNTER      Pt Name: Nicole Baum  MRN: [id number]  Birthdate 1990  Date of evaluation: 2024  Provider: Zack Brasher MD    CHIEF COMPLAINT       Chief Complaint   Patient presents with    Sore Throat    Cough    Chills    Shortness of Breath         HISTORY OF PRESENT ILLNESS   (Location/Symptom, Timing/Onset, Context/Setting, Quality, Duration, Modifying Factors, Severity)  Note limiting factors.   34-year-old female with PMHx of anxiety, depression, asthma, obesity presents to the emergency department complaining of sore throat since yesterday and cough with SOB since last night.  She has no additional complaints at this time.    The history is provided by the patient.         Review of External Medical Records:     Nursing Notes were reviewed.    REVIEW OF SYSTEMS    (2-9 systems for level 4, 10 or more for level 5)     Review of Systems   Constitutional:  Positive for fatigue.   HENT:  Positive for congestion, rhinorrhea, sinus pressure and sore throat.    Eyes: Negative.    Respiratory:  Positive for cough.    Cardiovascular: Negative.    Gastrointestinal: Negative.    Genitourinary: Negative.    Musculoskeletal: Negative.    Skin: Negative.    Neurological: Negative.    Psychiatric/Behavioral: Negative.         Except as noted above the remainder of the review of systems was reviewed and negative.       PAST MEDICAL HISTORY     Past Medical History:   Diagnosis Date    Anxiety     Asthma     Calculus of kidney     Depression     Dissociative disorder     Environmental allergies     GERD (gastroesophageal reflux disease)     Hashimoto's thyroiditis 10/24/2011    Hashimoto's thyroiditis 10/24/2011    History of pulmonary embolus (PE) 2018    Hypotension     Hypothyroid 2011    Hypothyroidism     MDD (major depressive disorder)     OCD (obsessive compulsive disorder)     Psychiatric disorder     depression --2 yr old daughter  2013     normal range or not returned as of this dictation.    EMERGENCY DEPARTMENT COURSE and DIFFERENTIAL DIAGNOSIS/MDM:   Vitals:    Vitals:    12/11/24 0824   BP: 116/75   Pulse: 93   Resp: 20   Temp: 98.6 °F (37 °C)   TempSrc: Oral   SpO2: 97%   Weight: 91.9 kg (202 lb 9.6 oz)   Height: 1.575 m (5' 2\")           Medical Decision Making  DDx: Viral URI, COVID, influenza, pneumonia    Plan:  - Labs: COVID, influenza  - Imaging: CXR  - Medications: Profen    Reassessment: ***               REASSESSMENT            CONSULTS:  None    PROCEDURES:  Unless otherwise noted below, none     Procedures      FINAL IMPRESSION    No diagnosis found.      DISPOSITION/PLAN   DISPOSITION        PATIENT REFERRED TO:  No follow-up provider specified.    DISCHARGE MEDICATIONS:  New Prescriptions    No medications on file         (Please note that portions of this note were completed with a voice recognition program.  Efforts were made to edit the dictations but occasionally words are mis-transcribed.)    Zack Brasher MD (electronically signed)  Emergency Attending Physician / Physician Assistant / Nurse Practitioner

## 2024-12-11 NOTE — DISCHARGE INSTRUCTIONS
You were seen in the emergency department for flulike symptoms, and were found to have influenza A.  Please take any medications prescribed at this visit as instructed.  Please follow-up with your PCP or return to the emergency department if you experience a worsening of symptoms or any new symptoms that are concerning to you.

## 2024-12-26 DIAGNOSIS — E06.3 HYPOTHYROIDISM DUE TO HASHIMOTO'S THYROIDITIS: ICD-10-CM

## 2024-12-27 RX ORDER — LEVOTHYROXINE SODIUM 137 UG/1
137 TABLET ORAL DAILY
Qty: 30 TABLET | Refills: 0 | Status: SHIPPED | OUTPATIENT
Start: 2024-12-27

## 2025-03-03 DIAGNOSIS — E06.3 HYPOTHYROIDISM DUE TO HASHIMOTO'S THYROIDITIS: ICD-10-CM

## 2025-03-04 RX ORDER — LEVOTHYROXINE SODIUM 137 UG/1
137 TABLET ORAL DAILY
Qty: 30 TABLET | Refills: 0 | OUTPATIENT
Start: 2025-03-04

## 2025-03-07 DIAGNOSIS — E06.3 HYPOTHYROIDISM DUE TO HASHIMOTO'S THYROIDITIS: ICD-10-CM

## 2025-03-07 RX ORDER — LEVOTHYROXINE SODIUM 137 UG/1
137 TABLET ORAL DAILY
Qty: 30 TABLET | Refills: 0 | Status: SHIPPED | OUTPATIENT
Start: 2025-03-07

## 2025-03-24 SDOH — ECONOMIC STABILITY: FOOD INSECURITY: WITHIN THE PAST 12 MONTHS, YOU WORRIED THAT YOUR FOOD WOULD RUN OUT BEFORE YOU GOT MONEY TO BUY MORE.: NEVER TRUE

## 2025-03-24 SDOH — ECONOMIC STABILITY: TRANSPORTATION INSECURITY
IN THE PAST 12 MONTHS, HAS LACK OF TRANSPORTATION KEPT YOU FROM MEETINGS, WORK, OR FROM GETTING THINGS NEEDED FOR DAILY LIVING?: NO

## 2025-03-24 SDOH — ECONOMIC STABILITY: FOOD INSECURITY: WITHIN THE PAST 12 MONTHS, THE FOOD YOU BOUGHT JUST DIDN'T LAST AND YOU DIDN'T HAVE MONEY TO GET MORE.: NEVER TRUE

## 2025-03-24 SDOH — ECONOMIC STABILITY: INCOME INSECURITY: IN THE LAST 12 MONTHS, WAS THERE A TIME WHEN YOU WERE NOT ABLE TO PAY THE MORTGAGE OR RENT ON TIME?: NO

## 2025-03-24 SDOH — ECONOMIC STABILITY: TRANSPORTATION INSECURITY
IN THE PAST 12 MONTHS, HAS THE LACK OF TRANSPORTATION KEPT YOU FROM MEDICAL APPOINTMENTS OR FROM GETTING MEDICATIONS?: NO

## 2025-03-24 ASSESSMENT — PATIENT HEALTH QUESTIONNAIRE - PHQ9
6. FEELING BAD ABOUT YOURSELF - OR THAT YOU ARE A FAILURE OR HAVE LET YOURSELF OR YOUR FAMILY DOWN: NEARLY EVERY DAY
SUM OF ALL RESPONSES TO PHQ QUESTIONS 1-9: 16
2. FEELING DOWN, DEPRESSED OR HOPELESS: MORE THAN HALF THE DAYS
10. IF YOU CHECKED OFF ANY PROBLEMS, HOW DIFFICULT HAVE THESE PROBLEMS MADE IT FOR YOU TO DO YOUR WORK, TAKE CARE OF THINGS AT HOME, OR GET ALONG WITH OTHER PEOPLE: VERY DIFFICULT
SUM OF ALL RESPONSES TO PHQ QUESTIONS 1-9: 17
8. MOVING OR SPEAKING SO SLOWLY THAT OTHER PEOPLE COULD HAVE NOTICED. OR THE OPPOSITE, BEING SO FIGETY OR RESTLESS THAT YOU HAVE BEEN MOVING AROUND A LOT MORE THAN USUAL: SEVERAL DAYS
SUM OF ALL RESPONSES TO PHQ QUESTIONS 1-9: 17
6. FEELING BAD ABOUT YOURSELF - OR THAT YOU ARE A FAILURE OR HAVE LET YOURSELF OR YOUR FAMILY DOWN: NEARLY EVERY DAY
1. LITTLE INTEREST OR PLEASURE IN DOING THINGS: SEVERAL DAYS
5. POOR APPETITE OR OVEREATING: SEVERAL DAYS
3. TROUBLE FALLING OR STAYING ASLEEP: NEARLY EVERY DAY
9. THOUGHTS THAT YOU WOULD BE BETTER OFF DEAD, OR OF HURTING YOURSELF: SEVERAL DAYS
9. THOUGHTS THAT YOU WOULD BE BETTER OFF DEAD, OR OF HURTING YOURSELF: SEVERAL DAYS
10. IF YOU CHECKED OFF ANY PROBLEMS, HOW DIFFICULT HAVE THESE PROBLEMS MADE IT FOR YOU TO DO YOUR WORK, TAKE CARE OF THINGS AT HOME, OR GET ALONG WITH OTHER PEOPLE: VERY DIFFICULT
SUM OF ALL RESPONSES TO PHQ QUESTIONS 1-9: 17
4. FEELING TIRED OR HAVING LITTLE ENERGY: MORE THAN HALF THE DAYS
5. POOR APPETITE OR OVEREATING: SEVERAL DAYS
8. MOVING OR SPEAKING SO SLOWLY THAT OTHER PEOPLE COULD HAVE NOTICED. OR THE OPPOSITE - BEING SO FIDGETY OR RESTLESS THAT YOU HAVE BEEN MOVING AROUND A LOT MORE THAN USUAL: SEVERAL DAYS
1. LITTLE INTEREST OR PLEASURE IN DOING THINGS: SEVERAL DAYS
2. FEELING DOWN, DEPRESSED OR HOPELESS: MORE THAN HALF THE DAYS
SUM OF ALL RESPONSES TO PHQ QUESTIONS 1-9: 17
4. FEELING TIRED OR HAVING LITTLE ENERGY: MORE THAN HALF THE DAYS
7. TROUBLE CONCENTRATING ON THINGS, SUCH AS READING THE NEWSPAPER OR WATCHING TELEVISION: NEARLY EVERY DAY
7. TROUBLE CONCENTRATING ON THINGS, SUCH AS READING THE NEWSPAPER OR WATCHING TELEVISION: NEARLY EVERY DAY
3. TROUBLE FALLING OR STAYING ASLEEP: NEARLY EVERY DAY

## 2025-03-24 ASSESSMENT — COLUMBIA-SUICIDE SEVERITY RATING SCALE - C-SSRS
1. IN THE PAST MONTH, HAVE YOU WISHED YOU WERE DEAD OR WISHED YOU COULD GO TO SLEEP AND NOT WAKE UP?: YES
6. IN YOUR LIFETIME, HAVE YOU EVER DONE ANYTHING, STARTED TO DO ANYTHING, OR PREPARED TO DO ANYTHING TO END YOUR LIFE?: NO
2. IN THE PAST MONTH, HAVE YOU ACTUALLY HAD ANY THOUGHTS OF KILLING YOURSELF?: NO

## 2025-03-25 ENCOUNTER — OFFICE VISIT (OUTPATIENT)
Age: 35
End: 2025-03-25
Payer: MEDICAID

## 2025-03-25 ENCOUNTER — LAB (OUTPATIENT)
Age: 35
End: 2025-03-25
Payer: MEDICAID

## 2025-03-25 VITALS
TEMPERATURE: 98.2 F | BODY MASS INDEX: 37.47 KG/M2 | RESPIRATION RATE: 16 BRPM | OXYGEN SATURATION: 98 % | DIASTOLIC BLOOD PRESSURE: 66 MMHG | WEIGHT: 203.6 LBS | HEART RATE: 99 BPM | HEIGHT: 62 IN | SYSTOLIC BLOOD PRESSURE: 118 MMHG

## 2025-03-25 DIAGNOSIS — F41.9 ANXIETY: ICD-10-CM

## 2025-03-25 DIAGNOSIS — E06.3 HYPOTHYROIDISM DUE TO HASHIMOTO'S THYROIDITIS: ICD-10-CM

## 2025-03-25 DIAGNOSIS — R53.83 MALAISE AND FATIGUE: ICD-10-CM

## 2025-03-25 DIAGNOSIS — E06.3 HYPOTHYROIDISM DUE TO HASHIMOTO'S THYROIDITIS: Primary | ICD-10-CM

## 2025-03-25 DIAGNOSIS — R53.81 MALAISE AND FATIGUE: ICD-10-CM

## 2025-03-25 DIAGNOSIS — E55.9 VITAMIN D DEFICIENCY: ICD-10-CM

## 2025-03-25 DIAGNOSIS — R73.09 ELEVATED GLUCOSE: ICD-10-CM

## 2025-03-25 PROCEDURE — 99213 OFFICE O/P EST LOW 20 MIN: CPT | Performed by: INTERNAL MEDICINE

## 2025-03-25 NOTE — PROGRESS NOTES
[facility]   Follow Up Progress Note  Patient: Nicole Baum  1990, 34 y.o., female  Encounter Date: 3/25/25    CHIEF COMPLAINT:  Chief Complaint   Patient presents with    Follow-up     Fup with thyroid med       ASSESSMENT & PLAN:    ICD-10-CM    1. Hypothyroidism due to Hashimoto's thyroiditis  E06.3 TSH     T4, Free      2. Vitamin D deficiency  E55.9 Vitamin D 25 Hydroxy      3. Malaise and fatigue  R53.81 CBC with Auto Differential    R53.83 Comprehensive Metabolic Panel      4. Elevated glucose  R73.09 Hemoglobin A1C      5. Anxiety  F41.9 She is currently seeing a psychiatrist and behavioral specialist. Life stressors as outlined below, are triggering her anxiety. She denies SI/HI        I have discussed the diagnosis with the patient and the intended treatment plan as seen in the above orders. The patient has received an after-visit summary and questions were answered concerning future plans. Asked to return should symptoms worsen or not improve with treatment. Any pending labs and studies will be relayed to patient when they become available.     Pt verbalizes understanding of plan of care and denies further questions or concerns at this time    Return in about 4 months (around 7/25/2025), or if symptoms worsen or fail to improve.    SUBJECTIVE  Nicole Baum is a 34 y.o. female presenting today for follow up of: Hypothyroid and also needs other labs. She is very anxious as she awaits sentencing for possibly 2-years for selling marijuana. She feels like some of the charges are unfair. She was given 2-felony convictions and 1-misdemeanor. She denies SI, but clearly this is upsetting.     Her  is working to get a reduced sentence, but the prosecutor - per her report - is not giving any concessions.     In the meantime, she has not been seen in over a year and needs labs and follow up. She see's gynecologist routinely.     Has no other major concerns or issues medically at

## 2025-03-25 NOTE — PROGRESS NOTES
Identified pt with two pt identifiers(name and )    Chief Complaint   Patient presents with    Follow-up     Fup with thyroid med        Health Maintenance Due   Topic    Varicella vaccine (1 of  - 13+ 2-dose series)    Hepatitis C screen     Hepatitis B vaccine (1 of 3 - + 3-dose series)    Flu vaccine (1)    COVID-19 Vaccine ( season)       Wt Readings from Last 3 Encounters:   25 92.4 kg (203 lb 9.6 oz)   24 91.9 kg (202 lb 9.6 oz)   24 64.9 kg (143 lb)     Temp Readings from Last 3 Encounters:   25 98.2 °F (36.8 °C) (Temporal)   24 98.6 °F (37 °C) (Oral)   24 97.8 °F (36.6 °C) (Temporal)     BP Readings from Last 3 Encounters:   25 118/66   24 116/75   24 118/67     Pulse Readings from Last 3 Encounters:   25 99   24 93   24 90           Depression Screening:  :         3/24/2025     9:57 PM 2024     2:38 PM 2023     3:31 PM   PHQ-9 Questionaire   Little interest or pleasure in doing things 1 1 1   Feeling down, depressed, or hopeless 2 1 1   Trouble falling or staying asleep, or sleeping too much 3 1 1   Feeling tired or having little energy 2 3 1   Poor appetite or overeating 1 1 3   Feeling bad about yourself - or that you are a failure or have let yourself or your family down 3 1 3   Trouble concentrating on things, such as reading the newspaper or watching television 3 1 1   Moving or speaking so slowly that other people could have noticed. Or the opposite - being so fidgety or restless that you have been moving around a lot more than usual 1 0 1   Thoughts that you would be better off dead, or of hurting yourself in some way 1 0 0   PHQ-9 Total Score 17  9 12   If you checked off any problems, how difficult have these problems made it for you to do your work, take care of things at home, or get along with other people? 2 1 1       Patient-reported        Fall Risk Assessment:  :          No data to display

## 2025-03-26 LAB
25(OH)D3 SERPL-MCNC: 17.4 NG/ML (ref 30–100)
ALBUMIN SERPL-MCNC: 4.3 G/DL (ref 3.5–5)
ALBUMIN/GLOB SERPL: 1.4 (ref 1.1–2.2)
ALP SERPL-CCNC: 64 U/L (ref 45–117)
ALT SERPL-CCNC: 20 U/L (ref 12–78)
ANION GAP SERPL CALC-SCNC: 3 MMOL/L (ref 2–12)
AST SERPL-CCNC: 11 U/L (ref 15–37)
BASOPHILS # BLD: 0.06 K/UL (ref 0–0.1)
BASOPHILS NFR BLD: 0.9 % (ref 0–1)
BILIRUB SERPL-MCNC: 0.3 MG/DL (ref 0.2–1)
BUN SERPL-MCNC: 9 MG/DL (ref 6–20)
BUN/CREAT SERPL: 11 (ref 12–20)
CALCIUM SERPL-MCNC: 9.7 MG/DL (ref 8.5–10.1)
CHLORIDE SERPL-SCNC: 108 MMOL/L (ref 97–108)
CO2 SERPL-SCNC: 29 MMOL/L (ref 21–32)
CREAT SERPL-MCNC: 0.81 MG/DL (ref 0.55–1.02)
DIFFERENTIAL METHOD BLD: NORMAL
EOSINOPHIL # BLD: 0.15 K/UL (ref 0–0.4)
EOSINOPHIL NFR BLD: 2.2 % (ref 0–7)
ERYTHROCYTE [DISTWIDTH] IN BLOOD BY AUTOMATED COUNT: 12.6 % (ref 11.5–14.5)
EST. AVERAGE GLUCOSE BLD GHB EST-MCNC: 103 MG/DL
GLOBULIN SER CALC-MCNC: 3.1 G/DL (ref 2–4)
GLUCOSE SERPL-MCNC: 113 MG/DL (ref 65–100)
HBA1C MFR BLD: 5.2 % (ref 4–5.6)
HCT VFR BLD AUTO: 39.1 % (ref 35–47)
HGB BLD-MCNC: 12.6 G/DL (ref 11.5–16)
IMM GRANULOCYTES # BLD AUTO: 0.01 K/UL (ref 0–0.04)
IMM GRANULOCYTES NFR BLD AUTO: 0.1 % (ref 0–0.5)
LYMPHOCYTES # BLD: 1.75 K/UL (ref 0.8–3.5)
LYMPHOCYTES NFR BLD: 25.3 % (ref 12–49)
MCH RBC QN AUTO: 29.4 PG (ref 26–34)
MCHC RBC AUTO-ENTMCNC: 32.2 G/DL (ref 30–36.5)
MCV RBC AUTO: 91.1 FL (ref 80–99)
MONOCYTES # BLD: 0.53 K/UL (ref 0–1)
MONOCYTES NFR BLD: 7.6 % (ref 5–13)
NEUTS SEG # BLD: 4.43 K/UL (ref 1.8–8)
NEUTS SEG NFR BLD: 63.9 % (ref 32–75)
NRBC # BLD: 0 K/UL (ref 0–0.01)
NRBC BLD-RTO: 0 PER 100 WBC
PLATELET # BLD AUTO: 330 K/UL (ref 150–400)
PMV BLD AUTO: 10 FL (ref 8.9–12.9)
POTASSIUM SERPL-SCNC: 4.1 MMOL/L (ref 3.5–5.1)
PROT SERPL-MCNC: 7.4 G/DL (ref 6.4–8.2)
RBC # BLD AUTO: 4.29 M/UL (ref 3.8–5.2)
SODIUM SERPL-SCNC: 140 MMOL/L (ref 136–145)
T4 FREE SERPL-MCNC: 1 NG/DL (ref 0.8–1.5)
TSH SERPL DL<=0.05 MIU/L-ACNC: 4.02 UIU/ML (ref 0.36–3.74)
WBC # BLD AUTO: 6.9 K/UL (ref 3.6–11)

## 2025-03-27 ENCOUNTER — RESULTS FOLLOW-UP (OUTPATIENT)
Age: 35
End: 2025-03-27

## 2025-03-28 NOTE — TELEPHONE ENCOUNTER
----- Message from Dr. Whit Blanco MD sent at 3/27/2025 11:22 PM EDT -----  Please let patient know that her labs showed a slightly elevated TSH, which could mean that her medication is too low. Is she taking any thyroid medication at this time? Also, her vitamin D is very low. I recommend vitamin D 50,000 international units weekly x 12 weeks, then take 2000 international units daily.  Would recommend repeating vitamin D levels in 8-12 weeks. I will send this to her pharmacy. Thanks!

## 2025-03-28 NOTE — TELEPHONE ENCOUNTER
Sent a [employer] message with results-patient is active in [employer].

## 2025-03-30 RX ORDER — LEVOTHYROXINE SODIUM 137 UG/1
137 TABLET ORAL DAILY
Qty: 90 TABLET | Refills: 1 | Status: SHIPPED | OUTPATIENT
Start: 2025-03-30

## 2025-06-24 DIAGNOSIS — B00.1 RECURRENT COLD SORES: ICD-10-CM

## 2025-06-24 DIAGNOSIS — E55.9 VITAMIN D DEFICIENCY: ICD-10-CM

## 2025-06-24 RX ORDER — VALACYCLOVIR HYDROCHLORIDE 1 G/1
TABLET, FILM COATED ORAL
Qty: 8 TABLET | Refills: 0 | Status: CANCELLED | OUTPATIENT
Start: 2025-06-24

## 2025-06-26 RX ORDER — VALACYCLOVIR HYDROCHLORIDE 500 MG/1
500 TABLET, FILM COATED ORAL 2 TIMES DAILY
Qty: 120 TABLET | Refills: 0 | Status: SHIPPED | OUTPATIENT
Start: 2025-06-26

## 2025-06-27 RX ORDER — ERGOCALCIFEROL 1.25 MG/1
50000 CAPSULE, LIQUID FILLED ORAL WEEKLY
Qty: 12 CAPSULE | Refills: 1 | OUTPATIENT
Start: 2025-06-27